# Patient Record
Sex: FEMALE | Race: WHITE | NOT HISPANIC OR LATINO | Employment: OTHER | URBAN - METROPOLITAN AREA
[De-identification: names, ages, dates, MRNs, and addresses within clinical notes are randomized per-mention and may not be internally consistent; named-entity substitution may affect disease eponyms.]

---

## 2017-01-04 ENCOUNTER — CLINICAL SUPPORT (OUTPATIENT)
Dept: FAMILY MEDICINE CLINIC | Facility: CLINIC | Age: 82
End: 2017-01-04

## 2017-01-04 PROCEDURE — 96372 THER/PROPH/DIAG INJ SC/IM: CPT | Performed by: FAMILY MEDICINE

## 2017-01-04 RX ADMIN — CYANOCOBALAMIN 1000 MCG: 1000 INJECTION, SOLUTION INTRAMUSCULAR; SUBCUTANEOUS at 15:56

## 2017-01-06 ENCOUNTER — TELEPHONE (OUTPATIENT)
Dept: FAMILY MEDICINE CLINIC | Facility: CLINIC | Age: 82
End: 2017-01-06

## 2017-01-06 NOTE — TELEPHONE ENCOUNTER
Patient seen in ER and was prescibed of Prednisone taper packet in the ER and wants to know if its okay to take this medication

## 2017-01-09 NOTE — TELEPHONE ENCOUNTER
Spoke with pt and she returned to er last night and they increased prednisone and she is doing better.  She was seen in er for hives and burning. jay

## 2017-01-17 ENCOUNTER — TELEPHONE (OUTPATIENT)
Dept: FAMILY MEDICINE CLINIC | Facility: CLINIC | Age: 82
End: 2017-01-17

## 2017-01-20 ENCOUNTER — OFFICE VISIT (OUTPATIENT)
Dept: FAMILY MEDICINE CLINIC | Facility: CLINIC | Age: 82
End: 2017-01-20

## 2017-01-20 VITALS
OXYGEN SATURATION: 91 % | SYSTOLIC BLOOD PRESSURE: 152 MMHG | DIASTOLIC BLOOD PRESSURE: 84 MMHG | HEART RATE: 93 BPM | TEMPERATURE: 98.2 F | BODY MASS INDEX: 28.53 KG/M2 | WEIGHT: 156 LBS

## 2017-01-20 DIAGNOSIS — D72.829 LEUKOCYTOSIS, UNSPECIFIED TYPE: ICD-10-CM

## 2017-01-20 DIAGNOSIS — L50.9 HIVES: Primary | ICD-10-CM

## 2017-01-20 DIAGNOSIS — E87.6 HYPOKALEMIA: ICD-10-CM

## 2017-01-20 PROCEDURE — 99213 OFFICE O/P EST LOW 20 MIN: CPT | Performed by: FAMILY MEDICINE

## 2017-01-20 NOTE — PROGRESS NOTES
Subjective   Kelvin Restrepo is a 85 y.o. female. Presents today for   Chief Complaint   Patient presents with   • Follow-up     went to [facility] er.  Had hives started jan. 6th and was given prednisone. Swelled every where and had burning on her skin.  then went back to er and they gave her another medrol dosepak.  Also gave her benadryl.  er dr said her potassium was low.  .       History of Present Illness  Patient went to ER had severe hives, given 8 tabs of prednisone to take daily for the hives.  Reports felt worse and had panic attack, seen again and started on medrol pack instead.  Reports developed severe weakness, crying uncontrollably while on prednisone.  Went back again as panic attack reported chest tightness so sent to [facility] for cardiac rule out.  However patient declined.  We called for records have cbc, INR, cxr;  WBC 18.4, was on steroid.  Reports still feels weak, but improved overall.  Had diarrhea, but improved.  Relates told potassium low (did not receive copy), shin was given PO potassium. CXR negative.  Reports cp gone, nervous/anxiety resolved.  HIves have resolved now.    Review of Systems   Cardiovascular: Negative for chest pain.   Gastrointestinal: Negative for abdominal pain, nausea and vomiting.   Psychiatric/Behavioral: The patient is not nervous/anxious.        The following portions of the patient's history were reviewed and updated as appropriate: allergies, current medications, past medical history and problem list.    Patient Active Problem List   Diagnosis   • Suprapubic pain   • Allergic state   • Chronic constipation   • Depression   • Gastroesophageal reflux disease   • Fatigue   • Hyperlipidemia   • Hypothyroidism   • Insomnia   • Obstructive sleep apnea syndrome   • Osteoarthritis of multiple joints   • Osteoporosis   • Cobalamin deficiency   • Essential hypertension       Allergies   Allergen Reactions   • Amlodipine        Current Outpatient  Prescriptions on File Prior to Visit   Medication Sig Dispense Refill   • aspirin 325 MG tablet Take  by mouth daily.     • atorvastatin (LIPITOR) 10 MG tablet Take 1 tablet by mouth daily. 90 tablet 3   • B Complex-Folic Acid (B COMPLEX-VITAMIN B12 PO) Take  by mouth.     • cetirizine (ZyrTEC) 10 MG tablet Take  by mouth daily.     • doxepin (SINEquan) 10 MG capsule Take 1 capsule by mouth Every Night. 90 capsule 1   • esomeprazole (NexIUM) 40 MG capsule Take 1 capsule by mouth every morning before breakfast. 30 capsule 11   • levothyroxine (SYNTHROID, LEVOTHROID) 112 MCG tablet Take 1 tablet by mouth daily. 90 tablet 3   • lisinopril (PRINIVIL,ZESTRIL) 20 MG tablet Take 1 tablet by mouth Daily. 90 tablet 3   • LORazepam (ATIVAN) 0.5 MG tablet Take 1 tablet by mouth daily. 90 tablet 1   • meclizine 25 MG chewable tablet chewable tablet Chew 1 tablet 3 (three) times a day as needed (dizziness). 90 tablet 1   • Melatonin 1 MG capsule Take 1 mg by mouth daily. 90 each 3   • montelukast (SINGULAIR) 10 MG tablet Take  by mouth daily.     • Multiple Minerals-Vitamins (CALCIUM & VIT D3 BONE HEALTH PO) Take  by mouth.     • ranitidine (ZANTAC) 150 MG tablet Take 150 mg by mouth 2 (two) times a day.     • vitamin C (ASCORBIC ACID) 500 MG tablet Take 1,000 mg by mouth daily.       Current Facility-Administered Medications on File Prior to Visit   Medication Dose Route Frequency Provider Last Rate Last Dose   • cyanocobalamin injection 1,000 mcg  1,000 mcg Intramuscular Q28 Days Leoncio Stoll DO   1,000 mcg at 01/04/17 1556       Objective   Vitals:    01/20/17 1244   BP: 152/84   Pulse: 93   Temp: 98.2 °F (36.8 °C)   SpO2: 91%   Weight: 156 lb (70.8 kg)       Physical Exam   Constitutional: She appears well-developed and well-nourished.   HENT:   Head: Normocephalic and atraumatic.   Neck: Neck supple. No JVD present. No thyromegaly present.   Cardiovascular: Normal rate, regular rhythm and normal heart sounds.  Exam  reveals no gallop and no friction rub.    No murmur heard.  Pulmonary/Chest: Effort normal and breath sounds normal. No respiratory distress. She has no wheezes. She has no rales.   Abdominal: Soft. Bowel sounds are normal. She exhibits no distension. There is no tenderness. There is no rebound and no guarding.   Musculoskeletal: She exhibits no edema.   Neurological: She is alert.   Skin: Skin is warm and dry.   Psychiatric: She has a normal mood and affect. Her behavior is normal.   Nursing note and vitals reviewed.      Assessment/Plan   Kelvin was seen today for follow-up.    Diagnoses and all orders for this visit:    Hives  -     CBC & Differential  -     Comprehensive Metabolic Panel    Hypokalemia  -     Comprehensive Metabolic Panel    Leukocytosis, unspecified type  -     CBC & Differential  -     Comprehensive Metabolic Panel    -will rcheck wbc and K+;  Stay off steroids for now.         -Follow up: 6 months       Current Outpatient Prescriptions:   •  aspirin 325 MG tablet, Take  by mouth daily., Disp: , Rfl:   •  atorvastatin (LIPITOR) 10 MG tablet, Take 1 tablet by mouth daily., Disp: 90 tablet, Rfl: 3  •  B Complex-Folic Acid (B COMPLEX-VITAMIN B12 PO), Take  by mouth., Disp: , Rfl:   •  cetirizine (ZyrTEC) 10 MG tablet, Take  by mouth daily., Disp: , Rfl:   •  doxepin (SINEquan) 10 MG capsule, Take 1 capsule by mouth Every Night., Disp: 90 capsule, Rfl: 1  •  esomeprazole (NexIUM) 40 MG capsule, Take 1 capsule by mouth every morning before breakfast., Disp: 30 capsule, Rfl: 11  •  levothyroxine (SYNTHROID, LEVOTHROID) 112 MCG tablet, Take 1 tablet by mouth daily., Disp: 90 tablet, Rfl: 3  •  lisinopril (PRINIVIL,ZESTRIL) 20 MG tablet, Take 1 tablet by mouth Daily., Disp: 90 tablet, Rfl: 3  •  LORazepam (ATIVAN) 0.5 MG tablet, Take 1 tablet by mouth daily., Disp: 90 tablet, Rfl: 1  •  meclizine 25 MG chewable tablet chewable tablet, Chew 1 tablet 3 (three) times a day as needed (dizziness)., Disp: 90  tablet, Rfl: 1  •  Melatonin 1 MG capsule, Take 1 mg by mouth daily., Disp: 90 each, Rfl: 3  •  montelukast (SINGULAIR) 10 MG tablet, Take  by mouth daily., Disp: , Rfl:   •  Multiple Minerals-Vitamins (CALCIUM & VIT D3 BONE HEALTH PO), Take  by mouth., Disp: , Rfl:   •  ranitidine (ZANTAC) 150 MG tablet, Take 150 mg by mouth 2 (two) times a day., Disp: , Rfl:   •  vitamin C (ASCORBIC ACID) 500 MG tablet, Take 1,000 mg by mouth daily., Disp: , Rfl:     Current Facility-Administered Medications:   •  cyanocobalamin injection 1,000 mcg, 1,000 mcg, Intramuscular, Q28 Days, Leoncio Stoll DO, 1,000 mcg at 01/04/17 1553

## 2017-01-20 NOTE — MR AVS SNAPSHOT
Kelvin Restrepo   1/20/2017 1:00 PM   Office Visit    Provider:  Leoncio Stoll DO   Department:  [facility]   Dept Phone:  [phone]                Your Full Care Plan              Your Updated Medication List          This list is accurate as of: 1/20/17  1:56 PM.  Always use your most recent med list.                aspirin 325 MG tablet       atorvastatin 10 MG tablet   Commonly known as:  LIPITOR   Take 1 tablet by mouth daily.       B COMPLEX-VITAMIN B12 PO       CALCIUM & VIT D3 BONE HEALTH PO       cetirizine 10 MG tablet   Commonly known as:  zyrTEC       doxepin 10 MG capsule   Commonly known as:  SINEquan   Take 1 capsule by mouth Every Night.       esomeprazole 40 MG capsule   Commonly known as:  nexIUM   Take 1 capsule by mouth every morning before breakfast.       levothyroxine 112 MCG tablet   Commonly known as:  SYNTHROID, LEVOTHROID   Take 1 tablet by mouth daily.       lisinopril 20 MG tablet   Commonly known as:  PRINIVIL,ZESTRIL   Take 1 tablet by mouth Daily.       LORazepam 0.5 MG tablet   Commonly known as:  ATIVAN   Take 1 tablet by mouth daily.       meclizine 25 MG chewable tablet chewable tablet   Chew 1 tablet 3 (three) times a day as needed (dizziness).       Melatonin 1 MG capsule   Take 1 mg by mouth daily.       montelukast 10 MG tablet   Commonly known as:  SINGULAIR       raNITIdine 150 MG tablet   Commonly known as:  ZANTAC       vitamin C 500 MG tablet   Commonly known as:  ASCORBIC ACID               We Performed the Following     CBC & Differential     Comprehensive Metabolic Panel       You Were Diagnosed With        Codes Comments    Hives    -  Primary ICD-10-CM: L50.9  ICD-9-CM: 708.9     Hypokalemia     ICD-10-CM: E87.6  ICD-9-CM: 276.8     Leukocytosis, unspecified type     ICD-10-CM: D72.829  ICD-9-CM: 288.60       Medications to be Given to You by a Medical Professional     Due       Frequency    (none)  cyanocobalamin injection 1,000 mcg  Every 28 Days      Instructions     None    Patient Instructions History      MyChart Signup     Our records indicate that you have declined [facility] sign[facility]. If you would like to sign up for [employer], please email [email or web address] or call [phone] to obtain an activation code.             Other Info from Your Visit           Allergies     Amlodipine        Reason for Visit     Follow-up went to [facility] er.  Had hives started jan. 6th and was given prednisone. Swelled every where and had burning on her skin.  then went back to er and they gave her another medrol dosepak.  Also gave her benadryl.  er dr said her potassium was low.  .      Vital Signs     Blood Pressure Pulse Temperature Weight Oxygen Saturation Body Mass Index    152/84 93 98.2 °F (36.8 °C) 156 lb (70.8 kg) 91% 28.53 kg/m2    Smoking Status                   Never Smoker           Problems and Diagnoses Noted     Hives    -  Primary    Hypokalemia        Elevated white blood cell count

## 2017-01-21 LAB
ALBUMIN SERPL-MCNC: 4.1 G/DL (ref 3.5–4.7)
ALBUMIN/GLOB SERPL: 1.7 {RATIO} (ref 1.1–2.5)
ALP SERPL-CCNC: 60 IU/L (ref 39–117)
ALT SERPL-CCNC: 29 IU/L (ref 0–32)
AST SERPL-CCNC: 21 IU/L (ref 0–40)
BASOPHILS # BLD AUTO: 0 X10E3/UL (ref 0–0.2)
BASOPHILS NFR BLD AUTO: 0 %
BILIRUB SERPL-MCNC: 0.4 MG/DL (ref 0–1.2)
BUN SERPL-MCNC: 12 MG/DL (ref 8–27)
BUN/CREAT SERPL: 15 (ref 11–26)
CALCIUM SERPL-MCNC: 9.1 MG/DL (ref 8.7–10.3)
CHLORIDE SERPL-SCNC: 94 MMOL/L (ref 96–106)
CO2 SERPL-SCNC: 22 MMOL/L (ref 18–29)
CREAT SERPL-MCNC: 0.79 MG/DL (ref 0.57–1)
EOSINOPHIL # BLD AUTO: 0 X10E3/UL (ref 0–0.4)
EOSINOPHIL NFR BLD AUTO: 0 %
ERYTHROCYTE [DISTWIDTH] IN BLOOD BY AUTOMATED COUNT: 13.8 % (ref 12.3–15.4)
GLOBULIN SER CALC-MCNC: 2.4 G/DL (ref 1.5–4.5)
GLUCOSE SERPL-MCNC: 68 MG/DL (ref 65–99)
HCT VFR BLD AUTO: 44.2 % (ref 34–46.6)
HGB BLD-MCNC: 14.2 G/DL (ref 11.1–15.9)
IMM GRANULOCYTES # BLD: 0 X10E3/UL (ref 0–0.1)
IMM GRANULOCYTES NFR BLD: 0 %
LYMPHOCYTES # BLD AUTO: 2 X10E3/UL (ref 0.7–3.1)
LYMPHOCYTES NFR BLD AUTO: 18 %
MCH RBC QN AUTO: 28.7 PG (ref 26.6–33)
MCHC RBC AUTO-ENTMCNC: 32.1 G/DL (ref 31.5–35.7)
MCV RBC AUTO: 90 FL (ref 79–97)
MONOCYTES # BLD AUTO: 1.3 X10E3/UL (ref 0.1–0.9)
MONOCYTES NFR BLD AUTO: 11 %
NEUTROPHILS # BLD AUTO: 7.8 X10E3/UL (ref 1.4–7)
NEUTROPHILS NFR BLD AUTO: 71 %
PLATELET # BLD AUTO: 238 X10E3/UL (ref 150–379)
POTASSIUM SERPL-SCNC: 4.5 MMOL/L (ref 3.5–5.2)
PROT SERPL-MCNC: 6.5 G/DL (ref 6–8.5)
RBC # BLD AUTO: 4.94 X10E6/UL (ref 3.77–5.28)
SODIUM SERPL-SCNC: 137 MMOL/L (ref 134–144)
WBC # BLD AUTO: 11.2 X10E3/UL (ref 3.4–10.8)

## 2017-01-23 ENCOUNTER — TELEPHONE (OUTPATIENT)
Dept: FAMILY MEDICINE CLINIC | Facility: CLINIC | Age: 82
End: 2017-01-23

## 2017-01-23 NOTE — PROGRESS NOTES
Call and mail copy of results to patient.  WBC count has dropped since off steroids.  Kidney, liver function and electrolytes normal range.

## 2017-01-23 NOTE — TELEPHONE ENCOUNTER
----- Message from Leoncio Stoll DO sent at 1/22/2017  7:17 PM EST -----  Call and mail copy of results to patient.  WBC count has dropped since off steroids.  Kidney, liver function and electrolytes normal range.

## 2017-02-16 ENCOUNTER — CLINICAL SUPPORT (OUTPATIENT)
Dept: FAMILY MEDICINE CLINIC | Facility: CLINIC | Age: 82
End: 2017-02-16

## 2017-02-16 DIAGNOSIS — E53.8 VITAMIN B 12 DEFICIENCY: ICD-10-CM

## 2017-02-16 PROCEDURE — 96372 THER/PROPH/DIAG INJ SC/IM: CPT | Performed by: FAMILY MEDICINE

## 2017-02-16 RX ADMIN — CYANOCOBALAMIN 1000 MCG: 1000 INJECTION, SOLUTION INTRAMUSCULAR; SUBCUTANEOUS at 15:00

## 2017-03-06 ENCOUNTER — TELEPHONE (OUTPATIENT)
Dept: FAMILY MEDICINE CLINIC | Facility: CLINIC | Age: 82
End: 2017-03-06

## 2017-03-06 DIAGNOSIS — E03.8 OTHER SPECIFIED HYPOTHYROIDISM: ICD-10-CM

## 2017-03-06 RX ORDER — LEVOTHYROXINE SODIUM 112 UG/1
112 TABLET ORAL DAILY
Qty: 90 TABLET | Refills: 3 | Status: SHIPPED | OUTPATIENT
Start: 2017-03-06 | End: 2018-03-30 | Stop reason: SDUPTHER

## 2017-03-13 ENCOUNTER — CLINICAL SUPPORT (OUTPATIENT)
Dept: FAMILY MEDICINE CLINIC | Facility: CLINIC | Age: 82
End: 2017-03-13

## 2017-03-13 DIAGNOSIS — E53.8 COBALAMIN DEFICIENCY: ICD-10-CM

## 2017-03-13 PROCEDURE — 96372 THER/PROPH/DIAG INJ SC/IM: CPT | Performed by: FAMILY MEDICINE

## 2017-03-13 RX ADMIN — CYANOCOBALAMIN 1000 MCG: 1000 INJECTION, SOLUTION INTRAMUSCULAR; SUBCUTANEOUS at 14:11

## 2017-04-05 DIAGNOSIS — F41.9 ANXIETY: ICD-10-CM

## 2017-04-05 RX ORDER — LORAZEPAM 0.5 MG/1
0.5 TABLET ORAL DAILY
Qty: 90 TABLET | Refills: 0 | Status: SHIPPED | OUTPATIENT
Start: 2017-04-05 | End: 2017-04-05 | Stop reason: SDUPTHER

## 2017-04-05 RX ORDER — LORAZEPAM 0.5 MG/1
0.5 TABLET ORAL DAILY
Qty: 90 TABLET | Refills: 0 | Status: SHIPPED | OUTPATIENT
Start: 2017-04-05 | End: 2017-09-18 | Stop reason: SDUPTHER

## 2017-05-03 ENCOUNTER — CLINICAL SUPPORT (OUTPATIENT)
Dept: FAMILY MEDICINE CLINIC | Facility: CLINIC | Age: 82
End: 2017-05-03

## 2017-05-03 DIAGNOSIS — E53.8 COBALAMIN DEFICIENCY: ICD-10-CM

## 2017-05-03 PROCEDURE — 96372 THER/PROPH/DIAG INJ SC/IM: CPT | Performed by: FAMILY MEDICINE

## 2017-05-03 RX ADMIN — CYANOCOBALAMIN 1000 MCG: 1000 INJECTION, SOLUTION INTRAMUSCULAR; SUBCUTANEOUS at 11:28

## 2017-06-09 ENCOUNTER — OFFICE VISIT (OUTPATIENT)
Dept: FAMILY MEDICINE CLINIC | Facility: CLINIC | Age: 82
End: 2017-06-09

## 2017-06-09 VITALS
HEIGHT: 62 IN | HEART RATE: 84 BPM | SYSTOLIC BLOOD PRESSURE: 154 MMHG | OXYGEN SATURATION: 93 % | TEMPERATURE: 98 F | BODY MASS INDEX: 28.71 KG/M2 | DIASTOLIC BLOOD PRESSURE: 82 MMHG | WEIGHT: 156 LBS

## 2017-06-09 DIAGNOSIS — E03.9 HYPOTHYROIDISM (ACQUIRED): ICD-10-CM

## 2017-06-09 DIAGNOSIS — R53.1 WEAKNESS: ICD-10-CM

## 2017-06-09 DIAGNOSIS — E56.9 VITAMIN DEFICIENCY: ICD-10-CM

## 2017-06-09 DIAGNOSIS — R53.83 FATIGUE, UNSPECIFIED TYPE: Primary | ICD-10-CM

## 2017-06-09 LAB
BILIRUB BLD-MCNC: NEGATIVE MG/DL
CLARITY, POC: CLEAR
COLOR UR: YELLOW
GLUCOSE UR STRIP-MCNC: NEGATIVE MG/DL
HCT VFR BLDA CALC: 43.9 %
HGB BLDA-MCNC: 13.9 G/DL
KETONES UR QL: NEGATIVE
LEUKOCYTE EST, POC: NEGATIVE
MCH, POC: 27.4
MCHC, POC: 30.9
MCV, POC: 88.7
NITRITE UR-MCNC: NEGATIVE MG/ML
PH UR: 5.5 [PH] (ref 5–8)
PLATELET # BLD AUTO: 228 10*3/MM3
PMV BLD: 8 FL
PROT UR STRIP-MCNC: NEGATIVE MG/DL
RBC # UR STRIP: ABNORMAL /UL
RBC, POC: 4.95
RDW, POC: 14.4
SP GR UR: 1.01 (ref 1–1.03)
UROBILINOGEN UR QL: NORMAL
WBC # BLD: 6.9 10*3/UL

## 2017-06-09 PROCEDURE — 36415 COLL VENOUS BLD VENIPUNCTURE: CPT | Performed by: NURSE PRACTITIONER

## 2017-06-09 PROCEDURE — 99213 OFFICE O/P EST LOW 20 MIN: CPT | Performed by: NURSE PRACTITIONER

## 2017-06-09 PROCEDURE — 85027 COMPLETE CBC AUTOMATED: CPT | Performed by: NURSE PRACTITIONER

## 2017-06-09 PROCEDURE — 81003 URINALYSIS AUTO W/O SCOPE: CPT | Performed by: NURSE PRACTITIONER

## 2017-06-09 NOTE — PROGRESS NOTES
Subjective   Kelvin Resrtepo is a 86 y.o. female.  Fatigue This as been going on for about a few months. It started gradually. She didn't really notice until lately when it's gotten to the point where she doesn't want to get up and do anything and that's not like her. She has not noticed any urinary symptoms, no blood in stools, no abdominal pain. She has known thyroid disease and Vitamin B12 deficiency.  She thinks she probably needs labs done because it's been a while.     Fatigue   This is a new problem. The current episode started more than 1 month ago. The problem occurs constantly. The problem has been gradually worsening. Associated symptoms include chills and fatigue. Pertinent negatives include no arthralgias, fever or myalgias. Associated symptoms comments: Just feels exhausted  . Treatments tried: Vitamin B 12 injection, last dose in early May.        The following portions of the patient's history were reviewed and updated as appropriate: allergies, current medications, past medical history, past social history and problem list.    Review of Systems   Constitutional: Positive for activity change, chills and fatigue. Negative for appetite change, fever and unexpected weight change.   Respiratory: Negative.    Cardiovascular: Negative.    Genitourinary: Negative.    Musculoskeletal: Negative for arthralgias and myalgias.       Objective   Physical Exam   Constitutional: Vital signs are normal. She appears well-developed and well-nourished. She is cooperative.   HENT:   Right Ear: Hearing and tympanic membrane normal.   Left Ear: Hearing and tympanic membrane normal.   Mouth/Throat: Uvula is midline, oropharynx is clear and moist and mucous membranes are normal.   Cardiovascular: Normal rate, regular rhythm and normal heart sounds.    Pulmonary/Chest: Effort normal and breath sounds normal.   Neurological: She is alert.   Nursing note and vitals reviewed.    Vitals:    06/09/17 1357   BP: 154/82   Pulse: 84  "  Temp: 98 °F (36.7 °C)   TempSrc: Oral   SpO2: 93%   Weight: 156 lb (70.8 kg)   Height: 62\" (157.5 cm)     Results for orders placed or performed in visit on 06/09/17   POC Urinalysis Dipstick, Automated   Result Value Ref Range    Color Yellow Yellow, Straw, Dark Yellow, Mercedes    Clarity, UA Clear Clear    Glucose, UA Negative Negative, 1000 mg/dL (3+) mg/dL    Bilirubin Negative Negative    Ketones, UA Negative Negative    Specific Gravity  1.010 1.005 - 1.030    Blood, UA Trace (A) Negative    pH, Urine 5.5 5.0 - 8.0    Protein, POC Negative Negative mg/dL    Urobilinogen, UA Normal Normal    Leukocytes Negative Negative    Nitrite, UA Negative Negative   POC CBC   Result Value Ref Range    Hematocrit 43.9 %    Hemoglobin 13.9 g/dL    RBC 4.95     WBC 6.9     MCV 88.7     MCH 27.4     MCHC 30.9     RDW-CV 14.4     Platelets 228 10*3/mm3    MPV 8.0        Assessment/Plan   Littie was seen today for fatigue.    Diagnoses and all orders for this visit:    Fatigue, unspecified type  -     TSH  -     Vitamin B12  -     Cancel: CBC & Differential  -     Comprehensive Metabolic Panel  -     POC Urinalysis Dipstick, Automated  -     POC CBC    Hypothyroidism (acquired)  -     TSH    Vitamin deficiency  -     Vitamin B12    Weakness  -     POC CBC    Fatigue: Will check some labs.   Follow up with Dr. Stoll as planned.  She will get a B12 injection today.           "

## 2017-06-10 LAB
ALBUMIN SERPL-MCNC: 4.5 G/DL (ref 3.5–4.7)
ALBUMIN/GLOB SERPL: 1.6 {RATIO} (ref 1.2–2.2)
ALP SERPL-CCNC: 80 IU/L (ref 39–117)
ALT SERPL-CCNC: 21 IU/L (ref 0–32)
AST SERPL-CCNC: 24 IU/L (ref 0–40)
BILIRUB SERPL-MCNC: 0.4 MG/DL (ref 0–1.2)
BUN SERPL-MCNC: 14 MG/DL (ref 8–27)
BUN/CREAT SERPL: 15 (ref 12–28)
CALCIUM SERPL-MCNC: 9.9 MG/DL (ref 8.7–10.3)
CHLORIDE SERPL-SCNC: 99 MMOL/L (ref 96–106)
CO2 SERPL-SCNC: 23 MMOL/L (ref 18–29)
CREAT SERPL-MCNC: 0.92 MG/DL (ref 0.57–1)
GLOBULIN SER CALC-MCNC: 2.8 G/DL (ref 1.5–4.5)
GLUCOSE SERPL-MCNC: 91 MG/DL (ref 65–99)
POTASSIUM SERPL-SCNC: 4.5 MMOL/L (ref 3.5–5.2)
PROT SERPL-MCNC: 7.3 G/DL (ref 6–8.5)
SODIUM SERPL-SCNC: 145 MMOL/L (ref 134–144)
TSH SERPL DL<=0.005 MIU/L-ACNC: 0.64 UIU/ML (ref 0.45–4.5)
VIT B12 SERPL-MCNC: 568 PG/ML (ref 211–946)

## 2017-06-11 LAB
BACTERIA UR CULT: NORMAL
BACTERIA UR CULT: NORMAL

## 2017-06-12 ENCOUNTER — TELEPHONE (OUTPATIENT)
Dept: FAMILY MEDICINE CLINIC | Facility: CLINIC | Age: 82
End: 2017-06-12

## 2017-06-12 ENCOUNTER — CLINICAL SUPPORT (OUTPATIENT)
Dept: FAMILY MEDICINE CLINIC | Facility: CLINIC | Age: 82
End: 2017-06-12

## 2017-06-12 DIAGNOSIS — E53.8 COBALAMIN DEFICIENCY: ICD-10-CM

## 2017-06-12 PROCEDURE — 96372 THER/PROPH/DIAG INJ SC/IM: CPT | Performed by: FAMILY MEDICINE

## 2017-06-12 RX ORDER — PANTOPRAZOLE SODIUM 40 MG/1
40 TABLET, DELAYED RELEASE ORAL DAILY
Qty: 30 TABLET | Refills: 11 | Status: SHIPPED | OUTPATIENT
Start: 2017-06-12 | End: 2019-10-08

## 2017-06-12 RX ADMIN — CYANOCOBALAMIN 1000 MCG: 1000 INJECTION, SOLUTION INTRAMUSCULAR; SUBCUTANEOUS at 17:43

## 2017-06-12 NOTE — TELEPHONE ENCOUNTER
Medication noted in the bottom of messages.   She is needing a script for Protonix because the pharmacy no longer covers Nexium.

## 2017-06-13 ENCOUNTER — TELEPHONE (OUTPATIENT)
Dept: FAMILY MEDICINE CLINIC | Facility: CLINIC | Age: 82
End: 2017-06-13

## 2017-06-13 NOTE — TELEPHONE ENCOUNTER
----- Message from VEENA Hoyt sent at 6/12/2017  1:06 PM EDT -----  Urine culture was negative. Other labs were ok. Thyroid normal, B 12 568, Chemistry normal range.  CBC still not resulted.

## 2017-07-03 ENCOUNTER — OFFICE VISIT (OUTPATIENT)
Dept: FAMILY MEDICINE CLINIC | Facility: CLINIC | Age: 82
End: 2017-07-03

## 2017-07-03 VITALS
WEIGHT: 155 LBS | HEIGHT: 62 IN | OXYGEN SATURATION: 96 % | DIASTOLIC BLOOD PRESSURE: 68 MMHG | BODY MASS INDEX: 28.52 KG/M2 | HEART RATE: 90 BPM | TEMPERATURE: 97.6 F | SYSTOLIC BLOOD PRESSURE: 122 MMHG

## 2017-07-03 DIAGNOSIS — M54.6 CHRONIC LEFT-SIDED THORACIC BACK PAIN: Primary | ICD-10-CM

## 2017-07-03 DIAGNOSIS — M54.50 CHRONIC LEFT-SIDED LOW BACK PAIN WITHOUT SCIATICA: ICD-10-CM

## 2017-07-03 DIAGNOSIS — G89.29 CHRONIC LEFT-SIDED LOW BACK PAIN WITHOUT SCIATICA: ICD-10-CM

## 2017-07-03 DIAGNOSIS — G89.29 CHRONIC LEFT-SIDED THORACIC BACK PAIN: Primary | ICD-10-CM

## 2017-07-03 PROCEDURE — 99213 OFFICE O/P EST LOW 20 MIN: CPT | Performed by: NURSE PRACTITIONER

## 2017-07-03 NOTE — PROGRESS NOTES
"Subjective   Kelvin Restrepo is a 86 y.o. female who presents c/o low back pain. Pain is starting to settle but would like to discuss a brace for future flare ups.     History of Present Illness   Low back pain will flare up periodically, with history of back problems, took a while to settle. Would like a back brace to support it during these times. Has never used a brace before, but felt stomach support would have helped with flare at the time.     Whole spine flares at times, but no new regions, overall settling to baseline. Reports typical flare will include L flank area when flared, thinks brace will help this.     Stomach is better since on protonix. No appetite initially off nexium due to formulary change at [place]. Still some bloating with milk products.   The following portions of the patient's history were reviewed and updated as appropriate: allergies, current medications, past family history, past medical history, past social history, past surgical history and problem list.    Review of Systems   Constitutional: Positive for activity change. Negative for fever.   Respiratory: Negative for chest tightness and shortness of breath.    Gastrointestinal: Negative for constipation (no bowel or bladder dysfunction) and diarrhea.   Genitourinary: Negative for difficulty urinating.   Musculoskeletal: Positive for back pain and gait problem. Negative for arthralgias, joint swelling and myalgias.   Neurological: Negative for weakness (leg) and headaches.     /68  Pulse 90  Temp 97.6 °F (36.4 °C) (Oral)   Ht 62\" (157.5 cm)  Wt 155 lb (70.3 kg)  SpO2 96%  BMI 28.35 kg/m2    Objective   Physical Exam   Constitutional: She appears well-developed and well-nourished. No distress.   Musculoskeletal: She exhibits no edema.        Thoracic back: She exhibits tenderness, pain and spasm.        Lumbar back: She exhibits decreased range of motion, tenderness, pain and spasm. She exhibits no bony tenderness, no " swelling and no edema.   SLR karlene negative, Kyler negative karlene, + SI tenderness, negative piriformis to distraction, no point tenderness.    Neurological: No sensory deficit. She exhibits normal muscle tone.   Reflex Scores:       Patellar reflexes are 2+ on the right side and 2+ on the left side.       Achilles reflexes are 2+ on the right side and 2+ on the left side.  Nursing note and vitals reviewed.    Assessment/Plan   Problems Addressed this Visit     None      Visit Diagnoses     Chronic left-sided thoracic back pain    -  Primary    Chronic left-sided low back pain without sciatica            For acute flares reasonable for a trial of back brace. Discussed limits, not to wear daily, only with flares intermittently.Rx issued.

## 2017-07-06 ENCOUNTER — TELEPHONE (OUTPATIENT)
Dept: FAMILY MEDICINE CLINIC | Facility: CLINIC | Age: 82
End: 2017-07-06

## 2017-07-07 DIAGNOSIS — G47.09 OTHER INSOMNIA: ICD-10-CM

## 2017-07-24 ENCOUNTER — OFFICE VISIT (OUTPATIENT)
Dept: FAMILY MEDICINE CLINIC | Facility: CLINIC | Age: 82
End: 2017-07-24

## 2017-07-24 VITALS
BODY MASS INDEX: 27.79 KG/M2 | WEIGHT: 151 LBS | SYSTOLIC BLOOD PRESSURE: 118 MMHG | HEART RATE: 92 BPM | DIASTOLIC BLOOD PRESSURE: 68 MMHG | TEMPERATURE: 98.4 F | HEIGHT: 62 IN | OXYGEN SATURATION: 96 %

## 2017-07-24 DIAGNOSIS — B02.9 HERPES ZOSTER WITHOUT COMPLICATION: Primary | ICD-10-CM

## 2017-07-24 PROCEDURE — 99213 OFFICE O/P EST LOW 20 MIN: CPT | Performed by: NURSE PRACTITIONER

## 2017-07-24 RX ORDER — GABAPENTIN 100 MG/1
100 CAPSULE ORAL 3 TIMES DAILY
Qty: 90 CAPSULE | Refills: 0 | Status: SHIPPED | OUTPATIENT
Start: 2017-07-24 | End: 2018-06-01

## 2017-07-24 RX ORDER — VALACYCLOVIR HYDROCHLORIDE 1 G/1
1000 TABLET, FILM COATED ORAL 3 TIMES DAILY
Qty: 21 TABLET | Refills: 0 | Status: SHIPPED | OUTPATIENT
Start: 2017-07-24 | End: 2018-06-01

## 2017-07-24 NOTE — PROGRESS NOTES
"Yudy Restrepo is a 86 y.o. female who presents c/o blisters on buttocks, groin, vagina and right hip. Went to GYN with [facility] last week and was given nystatin and an ointment.     History of Present Illness   Reports rash onset with 2-3 lesions last Friday(3 days ago)  Told yeast infection, has been applying nystatin, but lesions spreading to buttock, very painful to touch. Does not hurt to urinate  The following portions of the patient's history were reviewed and updated as appropriate: allergies, current medications, past family history, past medical history, past social history, past surgical history and problem list.    Review of Systems   Constitutional: Negative.  Negative for chills and fever.   HENT: Negative.    Respiratory: Negative.  Negative for cough and wheezing.    Skin: Positive for rash.   Allergic/Immunologic: Negative for environmental allergies and food allergies.     /68  Pulse 92  Temp 98.4 °F (36.9 °C) (Oral)   Ht 62\" (157.5 cm)  Wt 151 lb (68.5 kg)  SpO2 96%  BMI 27.62 kg/m2    Objective   Physical Exam   Constitutional: She appears well-developed and well-nourished.   Skin:   Blisters to R buttock, inner thigh and groin, confluent   Psychiatric: She has a normal mood and affect. Her behavior is normal. Judgment and thought content normal.   Nursing note and vitals reviewed.    Assessment/Plan   Problems Addressed this Visit     None      Visit Diagnoses     Herpes zoster without complication    -  Primary    Relevant Medications    gabapentin (NEURONTIN) 100 MG capsule    valACYclovir (VALTREX) 1000 MG tablet        Cautiously titrate up low dose gabapentin for severe pain. Instructions given. Start valtrex. Use contact precautions, separate bathrooms from spouse. Consider vaccination 6 months after healed. Consider vaccination for spouse.     HIWOT Report:    As part of this patient's treatment plan, I am prescribing controlled substances. The patient has been made " aware of appropriate use of such medications, including potential risk of somnolence, limited ability to drive and /or work safely, and potential for dependence or overdose. It has also been made clear that these medications are for use by this patient only, without concomitant use of alcohol or other substances unless prescribed.    HIWOT report has been reviewed by: VEENA Serrato on 07/24/17.  The report was scanned into the patient's chart.    Date of last HIWOT:  7/24/2017

## 2017-07-25 ENCOUNTER — TELEPHONE (OUTPATIENT)
Dept: FAMILY MEDICINE CLINIC | Facility: CLINIC | Age: 82
End: 2017-07-25

## 2017-07-25 RX ORDER — TRIAMCINOLONE ACETONIDE 0.25 MG/G
CREAM TOPICAL
Qty: 30 G | Refills: 0 | Status: SHIPPED | OUTPATIENT
Start: 2017-07-25 | End: 2018-06-01

## 2017-07-25 RX ORDER — TRIAMCINOLONE ACETONIDE 0.25 MG/G
OINTMENT TOPICAL 2 TIMES DAILY
Refills: 0 | Status: CANCELLED | OUTPATIENT
Start: 2017-07-25

## 2017-07-25 NOTE — TELEPHONE ENCOUNTER
Due to the delicate nature of the tissues where your shingles are at would recommend triamcinolone cream 0.025% cream bid to affected areas x 1 week dispense 30 gm 0rf

## 2017-08-14 ENCOUNTER — TELEPHONE (OUTPATIENT)
Dept: FAMILY MEDICINE CLINIC | Facility: CLINIC | Age: 82
End: 2017-08-14

## 2017-08-14 RX ORDER — ATORVASTATIN CALCIUM 10 MG/1
10 TABLET, FILM COATED ORAL DAILY
Qty: 90 TABLET | Refills: 3 | Status: SHIPPED | OUTPATIENT
Start: 2017-08-14 | End: 2017-08-14 | Stop reason: SDUPTHER

## 2017-08-14 RX ORDER — ATORVASTATIN CALCIUM 10 MG/1
10 TABLET, FILM COATED ORAL DAILY
Qty: 90 TABLET | Refills: 3 | Status: SHIPPED | OUTPATIENT
Start: 2017-08-14 | End: 2018-02-05 | Stop reason: SDUPTHER

## 2017-09-15 ENCOUNTER — TELEPHONE (OUTPATIENT)
Dept: FAMILY MEDICINE CLINIC | Facility: CLINIC | Age: 82
End: 2017-09-15

## 2017-09-18 DIAGNOSIS — Z79.899 DRUG THERAPY: Primary | ICD-10-CM

## 2017-09-18 DIAGNOSIS — F41.9 ANXIETY: ICD-10-CM

## 2017-09-18 RX ORDER — LORAZEPAM 0.5 MG/1
0.5 TABLET ORAL DAILY
Qty: 30 TABLET | Refills: 0 | Status: SHIPPED | OUTPATIENT
Start: 2017-09-18 | End: 2017-10-31 | Stop reason: SDUPTHER

## 2017-09-19 ENCOUNTER — CLINICAL SUPPORT (OUTPATIENT)
Dept: FAMILY MEDICINE CLINIC | Facility: CLINIC | Age: 82
End: 2017-09-19

## 2017-09-19 DIAGNOSIS — E53.8 COBALAMIN DEFICIENCY: ICD-10-CM

## 2017-09-19 PROCEDURE — 96372 THER/PROPH/DIAG INJ SC/IM: CPT | Performed by: FAMILY MEDICINE

## 2017-09-19 RX ADMIN — CYANOCOBALAMIN 1000 MCG: 1000 INJECTION, SOLUTION INTRAMUSCULAR; SUBCUTANEOUS at 15:38

## 2017-09-25 LAB — DRUGS UR: NORMAL

## 2017-10-03 ENCOUNTER — OFFICE VISIT (OUTPATIENT)
Dept: FAMILY MEDICINE CLINIC | Facility: CLINIC | Age: 82
End: 2017-10-03

## 2017-10-03 VITALS
HEART RATE: 79 BPM | WEIGHT: 157 LBS | DIASTOLIC BLOOD PRESSURE: 46 MMHG | TEMPERATURE: 97.7 F | BODY MASS INDEX: 28.72 KG/M2 | SYSTOLIC BLOOD PRESSURE: 86 MMHG | OXYGEN SATURATION: 98 %

## 2017-10-03 DIAGNOSIS — I95.1 ORTHOSTATIC HYPOTENSION: Primary | ICD-10-CM

## 2017-10-03 DIAGNOSIS — Z23 IMMUNIZATION DUE: ICD-10-CM

## 2017-10-03 DIAGNOSIS — I10 ESSENTIAL HYPERTENSION: ICD-10-CM

## 2017-10-03 PROCEDURE — 99214 OFFICE O/P EST MOD 30 MIN: CPT | Performed by: FAMILY MEDICINE

## 2017-10-03 PROCEDURE — G0008 ADMIN INFLUENZA VIRUS VAC: HCPCS | Performed by: FAMILY MEDICINE

## 2017-10-03 RX ORDER — LISINOPRIL 20 MG/1
10 TABLET ORAL DAILY
Qty: 90 TABLET | Refills: 3
Start: 2017-10-03 | End: 2017-11-14 | Stop reason: SDUPTHER

## 2017-10-03 NOTE — PROGRESS NOTES
Subjective   Kelvin Restrepo is a 86 y.o. female. Presents today for   Chief Complaint   Patient presents with   • Follow-up     follow up labs .  Having dizzy spells when getting up and flu injection.       Dizziness   This is a recurrent problem. The current episode started 1 to 4 weeks ago. The problem occurs intermittently. The problem has been waxing and waning. Pertinent negatives include no chest pain, headaches, numbness or weakness. Associated symptoms comments: Very light-headed and dizzy when sits or stands up.  No syncope;  Other day when standing up quickly from couch, fell backwards but no injuries.    No cp/soa;  No pnd/orthopnea    Did have recurrent episode of hives, lip swelling x1, had been much better and occurrences, sees allergists.  No throat swelling or tongue swelling;  No difficulty breathing;  Directed back to allergist.. Exacerbated by: standing up. Treatments tried: Better if takes time before walking. The treatment provided mild relief.     Since last seen has had shingles as well, healing now.    Non-smoker  No other medication hx changes since seen.    Review of Systems   Respiratory: Negative for shortness of breath.    Cardiovascular: Negative for chest pain.   Neurological: Positive for dizziness and light-headedness. Negative for syncope, facial asymmetry, speech difficulty, weakness, numbness and headaches.       The following portions of the patient's history were reviewed and updated as appropriate: allergies, current medications, past medical history and problem list.    Patient Active Problem List   Diagnosis   • Suprapubic pain   • Allergic state   • Chronic constipation   • Depression   • Gastroesophageal reflux disease   • Fatigue   • Hyperlipidemia   • Hypothyroidism   • Insomnia   • Obstructive sleep apnea syndrome   • Osteoarthritis of multiple joints   • Osteoporosis   • Cobalamin deficiency   • Essential hypertension       Allergies   Allergen Reactions   • Amlodipine       Patient does not recall ever having a reaction to this medication    • Prednisone Hives       Current Outpatient Prescriptions on File Prior to Visit   Medication Sig Dispense Refill   • aspirin 325 MG tablet Take  by mouth daily.     • atorvastatin (LIPITOR) 10 MG tablet Take 1 tablet by mouth Daily. 90 tablet 3   • B Complex-Folic Acid (B COMPLEX-VITAMIN B12 PO) Take  by mouth.     • cetirizine (ZyrTEC) 10 MG tablet Take 10 mg by mouth Daily.     • gabapentin (NEURONTIN) 100 MG capsule Take 1 capsule by mouth 3 (Three) Times a Day. 90 capsule 0   • levothyroxine (SYNTHROID, LEVOTHROID) 112 MCG tablet Take 1 tablet by mouth Daily. 90 tablet 3   • lisinopril (PRINIVIL,ZESTRIL) 20 MG tablet Take 1 tablet by mouth Daily. 90 tablet 3   • LORazepam (ATIVAN) 0.5 MG tablet Take 1 tablet by mouth Daily. 30 tablet 0   • meclizine 25 MG chewable tablet chewable tablet Chew 1 tablet 3 (three) times a day as needed (dizziness). 90 tablet 1   • Melatonin 1 MG capsule Take 1 mg by mouth Daily. 90 each 3   • montelukast (SINGULAIR) 10 MG tablet Take 10 mg by mouth Daily.     • Multiple Minerals-Vitamins (CALCIUM & VIT D3 BONE HEALTH PO) Take  by mouth.     • pantoprazole (PROTONIX) 40 MG EC tablet Take 1 tablet by mouth Daily. 30 tablet 11   • ranitidine (ZANTAC) 150 MG tablet Take 150 mg by mouth 2 (two) times a day.     • triamcinolone (KENALOG) 0.025 % cream Apply to affected area twice daily for 1 week 30 g 0   • valACYclovir (VALTREX) 1000 MG tablet Take 1 tablet by mouth 3 (Three) Times a Day. 21 tablet 0   • vitamin C (ASCORBIC ACID) 500 MG tablet Take 1,000 mg by mouth daily.       Current Facility-Administered Medications on File Prior to Visit   Medication Dose Route Frequency Provider Last Rate Last Dose   • cyanocobalamin injection 1,000 mcg  1,000 mcg Intramuscular Q28 Days Leoncio Stoll DO   1,000 mcg at 09/19/17 1538       Objective   Vitals:    10/03/17 1350 10/03/17 1411 10/03/17 1412 10/03/17 1413   BP:  120/70 157/88 138/93 (!) 86/46   BP Location:  Left arm Left arm Left arm   Patient Position:  Lying Sitting Standing   Pulse: 78 82 79    Temp: 97.7 °F (36.5 °C)      SpO2: 98%      Weight: 157 lb (71.2 kg)          Physical Exam   Constitutional: She appears well-developed and well-nourished.   HENT:   Head: Normocephalic and atraumatic.   Neck: Neck supple. No JVD present. No thyromegaly present.   Cardiovascular: Normal rate, regular rhythm and normal heart sounds.  Exam reveals no gallop and no friction rub.    No murmur heard.  Pulmonary/Chest: Effort normal and breath sounds normal. No respiratory distress. She has no wheezes. She has no rales.   Abdominal: Soft. Bowel sounds are normal. She exhibits no distension. There is no tenderness. There is no rebound and no guarding.   Musculoskeletal: She exhibits no edema.   Neurological: She is alert.   Skin: Skin is warm and dry.   Psychiatric: She has a normal mood and affect. Her behavior is normal.   Nursing note and vitals reviewed.      Assessment/Plan   Kelvin was seen today for follow-up.    Diagnoses and all orders for this visit:    Orthostatic hypotension    Immunization due  -     Flu Vaccine High Dose PF 65YR+    Essential hypertension  -     lisinopril (PRINIVIL,ZESTRIL) 20 MG tablet; Take 0.5 tablets by mouth Daily.    -cut lisinopril in half and will see if improves;  BP drops quite siginficantly with standing and discussed serious risks of fall and injury  -flu injection today         -Follow up: 4 weeks       Current Outpatient Prescriptions:   •  aspirin 325 MG tablet, Take  by mouth daily., Disp: , Rfl:   •  atorvastatin (LIPITOR) 10 MG tablet, Take 1 tablet by mouth Daily., Disp: 90 tablet, Rfl: 3  •  B Complex-Folic Acid (B COMPLEX-VITAMIN B12 PO), Take  by mouth., Disp: , Rfl:   •  cetirizine (ZyrTEC) 10 MG tablet, Take 10 mg by mouth Daily., Disp: , Rfl:   •  gabapentin (NEURONTIN) 100 MG capsule, Take 1 capsule by mouth 3 (Three) Times a  Day., Disp: 90 capsule, Rfl: 0  •  levothyroxine (SYNTHROID, LEVOTHROID) 112 MCG tablet, Take 1 tablet by mouth Daily., Disp: 90 tablet, Rfl: 3  •  lisinopril (PRINIVIL,ZESTRIL) 20 MG tablet, Take 1 tablet by mouth Daily., Disp: 90 tablet, Rfl: 3  •  LORazepam (ATIVAN) 0.5 MG tablet, Take 1 tablet by mouth Daily., Disp: 30 tablet, Rfl: 0  •  meclizine 25 MG chewable tablet chewable tablet, Chew 1 tablet 3 (three) times a day as needed (dizziness)., Disp: 90 tablet, Rfl: 1  •  Melatonin 1 MG capsule, Take 1 mg by mouth Daily., Disp: 90 each, Rfl: 3  •  montelukast (SINGULAIR) 10 MG tablet, Take 10 mg by mouth Daily., Disp: , Rfl:   •  Multiple Minerals-Vitamins (CALCIUM & VIT D3 BONE HEALTH PO), Take  by mouth., Disp: , Rfl:   •  pantoprazole (PROTONIX) 40 MG EC tablet, Take 1 tablet by mouth Daily., Disp: 30 tablet, Rfl: 11  •  ranitidine (ZANTAC) 150 MG tablet, Take 150 mg by mouth 2 (two) times a day., Disp: , Rfl:   •  triamcinolone (KENALOG) 0.025 % cream, Apply to affected area twice daily for 1 week, Disp: 30 g, Rfl: 0  •  valACYclovir (VALTREX) 1000 MG tablet, Take 1 tablet by mouth 3 (Three) Times a Day., Disp: 21 tablet, Rfl: 0  •  vitamin C (ASCORBIC ACID) 500 MG tablet, Take 1,000 mg by mouth daily., Disp: , Rfl:     Current Facility-Administered Medications:   •  cyanocobalamin injection 1,000 mcg, 1,000 mcg, Intramuscular, Q28 Days, Leoncio Stoll DO, 1,000 mcg at 09/19/17 1538

## 2017-10-31 ENCOUNTER — OFFICE VISIT (OUTPATIENT)
Dept: FAMILY MEDICINE CLINIC | Facility: CLINIC | Age: 82
End: 2017-10-31

## 2017-10-31 VITALS
BODY MASS INDEX: 29.45 KG/M2 | HEART RATE: 69 BPM | WEIGHT: 156 LBS | DIASTOLIC BLOOD PRESSURE: 80 MMHG | SYSTOLIC BLOOD PRESSURE: 130 MMHG | HEIGHT: 61 IN | OXYGEN SATURATION: 98 % | TEMPERATURE: 97.9 F

## 2017-10-31 DIAGNOSIS — Z90.11 H/O MASTECTOMY, RIGHT: ICD-10-CM

## 2017-10-31 DIAGNOSIS — F41.9 ANXIETY: ICD-10-CM

## 2017-10-31 DIAGNOSIS — I95.1 ORTHOSTATIC HYPOTENSION: ICD-10-CM

## 2017-10-31 DIAGNOSIS — I10 ESSENTIAL HYPERTENSION: Primary | ICD-10-CM

## 2017-10-31 PROCEDURE — 99213 OFFICE O/P EST LOW 20 MIN: CPT | Performed by: FAMILY MEDICINE

## 2017-10-31 RX ORDER — LORAZEPAM 0.5 MG/1
TABLET ORAL
Qty: 14 TABLET | Refills: 0 | Status: SHIPPED | OUTPATIENT
Start: 2017-10-31 | End: 2018-04-24

## 2017-10-31 RX ORDER — TIZANIDINE 2 MG/1
TABLET ORAL
Qty: 60 TABLET | Refills: 5 | Status: SHIPPED | OUTPATIENT
Start: 2017-10-31 | End: 2018-10-10

## 2017-10-31 NOTE — PROGRESS NOTES
uYdy Restrepo is a 86 y.o. female. Presents today for   Chief Complaint   Patient presents with   • Hypotension     pt here for 4 week follow up   • Anxiety     pt requesting refill of ativan.       Hypertension   This is a chronic problem. The current episode started more than 1 year ago. The problem is unchanged. The problem is controlled. Pertinent negatives include no chest pain, orthopnea, palpitations, peripheral edema, PND or shortness of breath. (Last ov severe orthostasis;  Resolved symptoms;  Not monitoring.) There are no associated agents to hypertension. Past treatments include ACE inhibitors (cut acei in half). The current treatment provides moderate improvement. There are no compliance problems.      FACE TO FACE:    Right breast prosthesis, needs to continue;  Had mastectomy 1996, utilizes breast prosthesis with appropriate fitting annually since 1996.    Review of Systems   Respiratory: Negative for shortness of breath.    Cardiovascular: Negative for chest pain, palpitations, orthopnea and PND.   Neurological: Negative for dizziness and light-headedness.       The following portions of the patient's history were reviewed and updated as appropriate: allergies, current medications, past medical history and problem list.    Patient Active Problem List   Diagnosis   • Suprapubic pain   • Allergic state   • Chronic constipation   • Depression   • Gastroesophageal reflux disease   • Fatigue   • Hyperlipidemia   • Hypothyroidism   • Insomnia   • Obstructive sleep apnea syndrome   • Osteoarthritis of multiple joints   • Osteoporosis   • Cobalamin deficiency   • Essential hypertension   • Anxiety       Allergies   Allergen Reactions   • Amlodipine      Patient does not recall ever having a reaction to this medication    • Prednisone Hives       Current Outpatient Prescriptions on File Prior to Visit   Medication Sig Dispense Refill   • aspirin 325 MG tablet Take  by mouth daily.     •  "atorvastatin (LIPITOR) 10 MG tablet Take 1 tablet by mouth Daily. 90 tablet 3   • B Complex-Folic Acid (B COMPLEX-VITAMIN B12 PO) Take  by mouth.     • cetirizine (ZyrTEC) 10 MG tablet Take 10 mg by mouth Daily.     • gabapentin (NEURONTIN) 100 MG capsule Take 1 capsule by mouth 3 (Three) Times a Day. 90 capsule 0   • levothyroxine (SYNTHROID, LEVOTHROID) 112 MCG tablet Take 1 tablet by mouth Daily. 90 tablet 3   • lisinopril (PRINIVIL,ZESTRIL) 20 MG tablet Take 0.5 tablets by mouth Daily. 90 tablet 3   • meclizine 25 MG chewable tablet chewable tablet Chew 1 tablet 3 (three) times a day as needed (dizziness). 90 tablet 1   • Melatonin 1 MG capsule Take 1 mg by mouth Daily. 90 each 3   • montelukast (SINGULAIR) 10 MG tablet Take 10 mg by mouth Daily.     • Multiple Minerals-Vitamins (CALCIUM & VIT D3 BONE HEALTH PO) Take  by mouth.     • pantoprazole (PROTONIX) 40 MG EC tablet Take 1 tablet by mouth Daily. 30 tablet 11   • ranitidine (ZANTAC) 150 MG tablet Take 150 mg by mouth 2 (two) times a day.     • triamcinolone (KENALOG) 0.025 % cream Apply to affected area twice daily for 1 week 30 g 0   • valACYclovir (VALTREX) 1000 MG tablet Take 1 tablet by mouth 3 (Three) Times a Day. 21 tablet 0   • vitamin C (ASCORBIC ACID) 500 MG tablet Take 1,000 mg by mouth daily.     • [DISCONTINUED] LORazepam (ATIVAN) 0.5 MG tablet Take 1 tablet by mouth Daily. 30 tablet 0     Current Facility-Administered Medications on File Prior to Visit   Medication Dose Route Frequency Provider Last Rate Last Dose   • cyanocobalamin injection 1,000 mcg  1,000 mcg Intramuscular Q28 Days Leoncio Stoll DO   1,000 mcg at 09/19/17 1538       Objective   Vitals:    10/31/17 1340 10/31/17 1346   BP: 146/90 130/80   BP Location: Left arm    Patient Position: Sitting    Cuff Size: Adult    Pulse: 69    Temp: 97.9 °F (36.6 °C)    TempSrc: Oral    SpO2: 98%    Weight: 156 lb (70.8 kg)    Height: 61\" (154.9 cm)        Physical Exam   Constitutional: " She appears well-developed and well-nourished.   HENT:   Head: Normocephalic and atraumatic.   Neck: Neck supple. No JVD present. No thyromegaly present.   Cardiovascular: Normal rate, regular rhythm and normal heart sounds.  Exam reveals no gallop and no friction rub.    No murmur heard.  Pulmonary/Chest: Effort normal and breath sounds normal. No respiratory distress. She has no wheezes. She has no rales.   Abdominal: Soft. Bowel sounds are normal. She exhibits no distension. There is no tenderness. There is no rebound and no guarding.   Musculoskeletal: She exhibits no edema.   Neurological: She is alert.   Skin: Skin is warm and dry.   Psychiatric: She has a normal mood and affect. Her behavior is normal.   Nursing note and vitals reviewed.  right mastectomy    Assessment/Plan   Littie was seen today for hypotension and anxiety.    Diagnoses and all orders for this visit:    Essential hypertension    Orthostatic hypotension    H/O mastectomy, right    Anxiety  -     LORazepam (ATIVAN) 0.5 MG tablet; 1/2 po nightly x 14 days, then 1/2 Sun, Tue, THur, Sat x 14 days, 1/2 Tue, Thurs x 14 day, 1/2 Tues x14 days then stop.    Other orders  -     tiZANidine (ZANAFLEX) 2 MG tablet; 1/2 to 1 every 12 hours prn back pain      -hx of right mastectomy, would medically benefit from continued prosthesis and bra;  Will fax Rx  -reports using ativan more for back pain then anything;     -I discussed today bzd short and long-term risks.  I also discussed that it is highly recommended that the patient wean off this medication given serious risks.  We discussed signs/symptoms of withdrawal and should not stop all at once and that can have withdrawal seizures and in rare cases death has been reported.  After discussion, I related that I am going to stop prescribing this class of drugs for the most part and for daily, long-term use.  Will try MR for pain instead and wean.         -Follow up: 6 months       Current Outpatient  Prescriptions:   •  aspirin 325 MG tablet, Take  by mouth daily., Disp: , Rfl:   •  atorvastatin (LIPITOR) 10 MG tablet, Take 1 tablet by mouth Daily., Disp: 90 tablet, Rfl: 3  •  B Complex-Folic Acid (B COMPLEX-VITAMIN B12 PO), Take  by mouth., Disp: , Rfl:   •  cetirizine (ZyrTEC) 10 MG tablet, Take 10 mg by mouth Daily., Disp: , Rfl:   •  gabapentin (NEURONTIN) 100 MG capsule, Take 1 capsule by mouth 3 (Three) Times a Day., Disp: 90 capsule, Rfl: 0  •  levothyroxine (SYNTHROID, LEVOTHROID) 112 MCG tablet, Take 1 tablet by mouth Daily., Disp: 90 tablet, Rfl: 3  •  lisinopril (PRINIVIL,ZESTRIL) 20 MG tablet, Take 0.5 tablets by mouth Daily., Disp: 90 tablet, Rfl: 3  •  LORazepam (ATIVAN) 0.5 MG tablet, 1/2 po nightly x 14 days, then 1/2 Sun, Tue, THur, Sat x 14 days, 1/2 Tue, Thurs x 14 day, 1/2 Tues x14 days then stop., Disp: 14 tablet, Rfl: 0  •  meclizine 25 MG chewable tablet chewable tablet, Chew 1 tablet 3 (three) times a day as needed (dizziness)., Disp: 90 tablet, Rfl: 1  •  Melatonin 1 MG capsule, Take 1 mg by mouth Daily., Disp: 90 each, Rfl: 3  •  montelukast (SINGULAIR) 10 MG tablet, Take 10 mg by mouth Daily., Disp: , Rfl:   •  Multiple Minerals-Vitamins (CALCIUM & VIT D3 BONE HEALTH PO), Take  by mouth., Disp: , Rfl:   •  pantoprazole (PROTONIX) 40 MG EC tablet, Take 1 tablet by mouth Daily., Disp: 30 tablet, Rfl: 11  •  ranitidine (ZANTAC) 150 MG tablet, Take 150 mg by mouth 2 (two) times a day., Disp: , Rfl:   •  triamcinolone (KENALOG) 0.025 % cream, Apply to affected area twice daily for 1 week, Disp: 30 g, Rfl: 0  •  valACYclovir (VALTREX) 1000 MG tablet, Take 1 tablet by mouth 3 (Three) Times a Day., Disp: 21 tablet, Rfl: 0  •  vitamin C (ASCORBIC ACID) 500 MG tablet, Take 1,000 mg by mouth daily., Disp: , Rfl:   •  tiZANidine (ZANAFLEX) 2 MG tablet, 1/2 to 1 every 12 hours prn back pain, Disp: 60 tablet, Rfl: 5    Current Facility-Administered Medications:   •  cyanocobalamin injection 1,000  mcg, 1,000 mcg, Intramuscular, Q28 Days, Leoncio Stoll DO, 1,000 mcg at 09/19/17 1537

## 2017-11-14 ENCOUNTER — TELEPHONE (OUTPATIENT)
Dept: FAMILY MEDICINE CLINIC | Facility: CLINIC | Age: 82
End: 2017-11-14

## 2017-11-14 DIAGNOSIS — I10 ESSENTIAL HYPERTENSION: ICD-10-CM

## 2017-11-14 RX ORDER — LISINOPRIL 20 MG/1
10 TABLET ORAL DAILY
Qty: 90 TABLET | Refills: 3 | Status: SHIPPED | OUTPATIENT
Start: 2017-11-14 | End: 2018-11-14 | Stop reason: DRUGHIGH

## 2017-12-04 ENCOUNTER — CLINICAL SUPPORT (OUTPATIENT)
Dept: FAMILY MEDICINE CLINIC | Facility: CLINIC | Age: 82
End: 2017-12-04

## 2017-12-04 DIAGNOSIS — E53.8 COBALAMIN DEFICIENCY: ICD-10-CM

## 2017-12-04 PROCEDURE — 96372 THER/PROPH/DIAG INJ SC/IM: CPT | Performed by: FAMILY MEDICINE

## 2017-12-04 RX ADMIN — CYANOCOBALAMIN 1000 MCG: 1000 INJECTION, SOLUTION INTRAMUSCULAR; SUBCUTANEOUS at 10:14

## 2018-01-29 ENCOUNTER — CLINICAL SUPPORT (OUTPATIENT)
Dept: FAMILY MEDICINE CLINIC | Facility: CLINIC | Age: 83
End: 2018-01-29

## 2018-01-29 DIAGNOSIS — E53.8 COBALAMIN DEFICIENCY: ICD-10-CM

## 2018-01-29 PROCEDURE — 96372 THER/PROPH/DIAG INJ SC/IM: CPT | Performed by: FAMILY MEDICINE

## 2018-01-29 RX ADMIN — CYANOCOBALAMIN 1000 MCG: 1000 INJECTION, SOLUTION INTRAMUSCULAR; SUBCUTANEOUS at 13:39

## 2018-02-05 ENCOUNTER — TELEPHONE (OUTPATIENT)
Dept: FAMILY MEDICINE CLINIC | Facility: CLINIC | Age: 83
End: 2018-02-05

## 2018-02-05 RX ORDER — ATORVASTATIN CALCIUM 10 MG/1
10 TABLET, FILM COATED ORAL DAILY
Qty: 90 TABLET | Refills: 3 | Status: SHIPPED | OUTPATIENT
Start: 2018-02-05 | End: 2019-02-06 | Stop reason: SDUPTHER

## 2018-03-01 ENCOUNTER — CLINICAL SUPPORT (OUTPATIENT)
Dept: FAMILY MEDICINE CLINIC | Facility: CLINIC | Age: 83
End: 2018-03-01

## 2018-03-01 DIAGNOSIS — E53.8 B12 DEFICIENCY: ICD-10-CM

## 2018-03-01 PROCEDURE — 96372 THER/PROPH/DIAG INJ SC/IM: CPT | Performed by: FAMILY MEDICINE

## 2018-03-01 RX ADMIN — CYANOCOBALAMIN 1000 MCG: 1000 INJECTION, SOLUTION INTRAMUSCULAR; SUBCUTANEOUS at 15:24

## 2018-03-30 ENCOUNTER — TELEPHONE (OUTPATIENT)
Dept: FAMILY MEDICINE CLINIC | Facility: CLINIC | Age: 83
End: 2018-03-30

## 2018-03-30 DIAGNOSIS — E03.8 OTHER SPECIFIED HYPOTHYROIDISM: ICD-10-CM

## 2018-03-30 RX ORDER — LEVOTHYROXINE SODIUM 112 UG/1
112 TABLET ORAL DAILY
Qty: 90 TABLET | Refills: 3 | Status: SHIPPED | OUTPATIENT
Start: 2018-03-30 | End: 2019-04-12 | Stop reason: SDUPTHER

## 2018-04-24 ENCOUNTER — OFFICE VISIT (OUTPATIENT)
Dept: FAMILY MEDICINE CLINIC | Facility: CLINIC | Age: 83
End: 2018-04-24

## 2018-04-24 VITALS
SYSTOLIC BLOOD PRESSURE: 142 MMHG | BODY MASS INDEX: 30.04 KG/M2 | WEIGHT: 159 LBS | TEMPERATURE: 97.3 F | HEART RATE: 84 BPM | OXYGEN SATURATION: 97 % | DIASTOLIC BLOOD PRESSURE: 80 MMHG

## 2018-04-24 DIAGNOSIS — H81.12 BENIGN PAROXYSMAL POSITIONAL VERTIGO OF LEFT EAR: ICD-10-CM

## 2018-04-24 DIAGNOSIS — E03.8 OTHER SPECIFIED HYPOTHYROIDISM: ICD-10-CM

## 2018-04-24 DIAGNOSIS — E53.8 COBALAMIN DEFICIENCY: ICD-10-CM

## 2018-04-24 DIAGNOSIS — G47.09 OTHER INSOMNIA: ICD-10-CM

## 2018-04-24 DIAGNOSIS — M25.512 CHRONIC PAIN OF BOTH SHOULDERS: ICD-10-CM

## 2018-04-24 DIAGNOSIS — M25.511 CHRONIC PAIN OF BOTH SHOULDERS: ICD-10-CM

## 2018-04-24 DIAGNOSIS — M81.0 AGE-RELATED OSTEOPOROSIS WITHOUT CURRENT PATHOLOGICAL FRACTURE: ICD-10-CM

## 2018-04-24 DIAGNOSIS — E78.49 OTHER HYPERLIPIDEMIA: ICD-10-CM

## 2018-04-24 DIAGNOSIS — G89.29 CHRONIC PAIN OF BOTH SHOULDERS: ICD-10-CM

## 2018-04-24 DIAGNOSIS — E55.9 VITAMIN D DEFICIENCY: ICD-10-CM

## 2018-04-24 DIAGNOSIS — I10 ESSENTIAL HYPERTENSION: Primary | ICD-10-CM

## 2018-04-24 LAB
HCT VFR BLDA CALC: 40.5 %
HGB BLDA-MCNC: 13.5 G/DL
MCH, POC: 29.1
MCHC, POC: 33.3
MCV, POC: 87.6
PLATELET # BLD AUTO: 167 10*3/MM3
PMV BLD: 9.4 FL
RBC, POC: 4.63
RDW, POC: 13.8
WBC # BLD: 6.4 10*3/UL

## 2018-04-24 PROCEDURE — 85027 COMPLETE CBC AUTOMATED: CPT | Performed by: FAMILY MEDICINE

## 2018-04-24 PROCEDURE — 77080 DXA BONE DENSITY AXIAL: CPT | Performed by: FAMILY MEDICINE

## 2018-04-24 PROCEDURE — 96372 THER/PROPH/DIAG INJ SC/IM: CPT | Performed by: FAMILY MEDICINE

## 2018-04-24 PROCEDURE — 99214 OFFICE O/P EST MOD 30 MIN: CPT | Performed by: FAMILY MEDICINE

## 2018-04-24 RX ORDER — TRAZODONE HYDROCHLORIDE 50 MG/1
50 TABLET ORAL NIGHTLY
Qty: 90 TABLET | Refills: 3 | Status: SHIPPED | OUTPATIENT
Start: 2018-04-24 | End: 2019-10-08

## 2018-04-24 RX ADMIN — CYANOCOBALAMIN 1000 MCG: 1000 INJECTION, SOLUTION INTRAMUSCULAR; SUBCUTANEOUS at 13:12

## 2018-04-24 NOTE — PROGRESS NOTES
Subjective   Kelvin Restrepo is a 87 y.o. female. Presents today for   Chief Complaint   Patient presents with   • Follow-up     needs dexascan scheduled.  Bilateral shoulder pain and dizzy spells when she lays flat.  Insomnia       Hypertension   This is a chronic problem. The current episode started more than 1 year ago. The problem is unchanged. The problem is controlled. Associated symptoms include malaise/fatigue. Pertinent negatives include no blurred vision, chest pain, orthopnea, palpitations, peripheral edema, PND or shortness of breath. There are no associated agents to hypertension. Risk factors for coronary artery disease include dyslipidemia and post-menopausal state. Past treatments include ACE inhibitors. Current antihypertension treatment includes ACE inhibitors. The current treatment provides moderate improvement. There are no compliance problems.  There is no history of CAD/MI or CVA. There is no history of chronic renal disease.   Hyperlipidemia   This is a chronic problem. The current episode started more than 1 year ago. The problem is controlled. Recent lipid tests were reviewed and are normal. Exacerbating diseases include hypothyroidism. She has no history of chronic renal disease. There are no known factors aggravating her hyperlipidemia. Pertinent negatives include no chest pain or shortness of breath. Current antihyperlipidemic treatment includes statins. The current treatment provides moderate improvement of lipids. There are no compliance problems.  Risk factors for coronary artery disease include dyslipidemia, hypertension and post-menopausal.   Dizziness   This is a recurrent problem. Episode onset: Couple months. The problem occurs intermittently. The problem has been waxing and waning. Associated symptoms include fatigue and vertigo. Pertinent negatives include no chest pain. Associated symptoms comments: Changes positions develops vertigo lasting few seconds then gone.  No slurred  speech, facial droops or loss of power one arm/leg or paresthesia.. Exacerbated by: quick head movement. She has tried nothing (Had therapy in remote past with relief) for the symptoms. The treatment provided no relief.   Upper Extremity Issue   This is a chronic problem. The current episode started more than 1 year ago. The problem occurs intermittently. The problem has been waxing and waning. Associated symptoms include fatigue and vertigo. Pertinent negatives include no chest pain. Exacerbated by: movement. She has tried NSAIDs, ice and heat for the symptoms. The treatment provided mild relief.   Hypothyroidism   This is a chronic problem. The current episode started more than 1 year ago. The problem occurs constantly. The problem has been unchanged. Associated symptoms include fatigue and vertigo. Pertinent negatives include no chest pain. Nothing aggravates the symptoms. Treatments tried: Due for check, on levothyroxine. The treatment provided moderate relief.   Osteoporosis  Due for dexa scan, not had in long time.  Low vitamin D, due recheck    Hx of b12 def, due for injection today    Review of Systems   Constitutional: Positive for fatigue and malaise/fatigue.   Eyes: Negative for blurred vision.   Respiratory: Negative for shortness of breath.    Cardiovascular: Negative for chest pain, palpitations, orthopnea and PND.   Neurological: Positive for dizziness and vertigo.       The following portions of the patient's history were reviewed and updated as appropriate: allergies, current medications, past medical history and problem list.    Patient Active Problem List   Diagnosis   • Suprapubic pain   • Allergic state   • Chronic constipation   • Depression   • Gastroesophageal reflux disease   • Fatigue   • Hyperlipidemia   • Hypothyroidism   • Insomnia   • Obstructive sleep apnea syndrome   • Osteoarthritis of multiple joints   • Osteoporosis   • Cobalamin deficiency   • Essential hypertension   • Anxiety        Allergies   Allergen Reactions   • Amlodipine      Patient does not recall ever having a reaction to this medication    • Prednisone Hives       Current Outpatient Prescriptions on File Prior to Visit   Medication Sig Dispense Refill   • aspirin 325 MG tablet Take  by mouth daily.     • atorvastatin (LIPITOR) 10 MG tablet Take 1 tablet by mouth Daily. 90 tablet 3   • B Complex-Folic Acid (B COMPLEX-VITAMIN B12 PO) Take  by mouth.     • cetirizine (ZyrTEC) 10 MG tablet Take 10 mg by mouth Daily.     • gabapentin (NEURONTIN) 100 MG capsule Take 1 capsule by mouth 3 (Three) Times a Day. 90 capsule 0   • levothyroxine (SYNTHROID, LEVOTHROID) 112 MCG tablet Take 1 tablet by mouth Daily. 90 tablet 3   • lisinopril (PRINIVIL,ZESTRIL) 20 MG tablet Take 0.5 tablets by mouth Daily. 90 tablet 3   • meclizine 25 MG chewable tablet chewable tablet Chew 1 tablet 3 (three) times a day as needed (dizziness). 90 tablet 1   • Melatonin 1 MG capsule Take 1 mg by mouth Daily. 90 each 3   • montelukast (SINGULAIR) 10 MG tablet Take 10 mg by mouth Daily.     • Multiple Minerals-Vitamins (CALCIUM & VIT D3 BONE HEALTH PO) Take  by mouth.     • pantoprazole (PROTONIX) 40 MG EC tablet Take 1 tablet by mouth Daily. 30 tablet 11   • ranitidine (ZANTAC) 150 MG tablet Take 150 mg by mouth 2 (two) times a day.     • tiZANidine (ZANAFLEX) 2 MG tablet 1/2 to 1 every 12 hours prn back pain 60 tablet 5   • triamcinolone (KENALOG) 0.025 % cream Apply to affected area twice daily for 1 week 30 g 0   • valACYclovir (VALTREX) 1000 MG tablet Take 1 tablet by mouth 3 (Three) Times a Day. 21 tablet 0   • vitamin C (ASCORBIC ACID) 500 MG tablet Take 1,000 mg by mouth daily.     • [DISCONTINUED] LORazepam (ATIVAN) 0.5 MG tablet 1/2 po nightly x 14 days, then 1/2 Sun, Tue, THur, Sat x 14 days, 1/2 Tue, Thurs x 14 day, 1/2 Tues x14 days then stop. 14 tablet 0     Current Facility-Administered Medications on File Prior to Visit   Medication Dose Route  Frequency Provider Last Rate Last Dose   • cyanocobalamin injection 1,000 mcg  1,000 mcg Intramuscular Q28 Days Leoncio Stoll DO   1,000 mcg at 04/24/18 1312       Objective   Vitals:    04/24/18 1256   BP: 142/80   Pulse: 84   Temp: 97.3 °F (36.3 °C)   SpO2: 97%   Weight: 72.1 kg (159 lb)       Physical Exam   Constitutional: She appears well-developed and well-nourished.   HENT:   Head: Normocephalic and atraumatic.   Neck: Neck supple. No JVD present. No thyromegaly present.   Cardiovascular: Normal rate, regular rhythm and normal heart sounds.  Exam reveals no gallop and no friction rub.    No murmur heard.  Pulmonary/Chest: Effort normal and breath sounds normal. No respiratory distress. She has no wheezes. She has no rales.   Abdominal: Soft. Bowel sounds are normal. She exhibits no distension. There is no tenderness. There is no rebound and no guarding.   Musculoskeletal: She exhibits no edema.   Neurological: She is alert.   Skin: Skin is warm and dry.   Psychiatric: She has a normal mood and affect. Her behavior is normal.   Nursing note and vitals reviewed.      CBC ordered and reviewed.    Assessment/Plan   Kelvin was seen today for follow-up.    Diagnoses and all orders for this visit:    Essential hypertension  -     Comprehensive Metabolic Panel  -     Lipid Panel  -     TSH    Cobalamin deficiency  -     POC CBC    Other hyperlipidemia  -     Comprehensive Metabolic Panel  -     Lipid Panel  -     TSH    Other specified hypothyroidism  -     Comprehensive Metabolic Panel  -     Lipid Panel  -     TSH    Age-related osteoporosis without current pathological fracture  -     Vitamin D 25 Hydroxy  -     DEXA Bone Density Axial    Vitamin D deficiency  -     Vitamin D 25 Hydroxy    Other insomnia  -     traZODone (DESYREL) 50 MG tablet; Take 1 tablet by mouth Every Night.    Chronic pain of both shoulders  -     diclofenac (VOLTAREN) 1 % gel gel; Apply 4 g topically 4 (Four) Times a Day As Needed  (shoulder pain).    Benign paroxysmal positional vertigo of left ear  -     Ambulatory Referral to Physical Therapy Evaluate and treat    -recheck vitamin D and dexa scan  -refer to PT for BPV, helped in past  -hypertension - controlled, continue medications  -HLD - continue statin, recheck lipids.  -hypothyroidism - continue medication, recheck thyroid labs.  -up date B12 today  -will try voltaren gel for pain         -Follow up: 6 months       Current Outpatient Prescriptions:   •  aspirin 325 MG tablet, Take  by mouth daily., Disp: , Rfl:   •  atorvastatin (LIPITOR) 10 MG tablet, Take 1 tablet by mouth Daily., Disp: 90 tablet, Rfl: 3  •  B Complex-Folic Acid (B COMPLEX-VITAMIN B12 PO), Take  by mouth., Disp: , Rfl:   •  cetirizine (ZyrTEC) 10 MG tablet, Take 10 mg by mouth Daily., Disp: , Rfl:   •  gabapentin (NEURONTIN) 100 MG capsule, Take 1 capsule by mouth 3 (Three) Times a Day., Disp: 90 capsule, Rfl: 0  •  levothyroxine (SYNTHROID, LEVOTHROID) 112 MCG tablet, Take 1 tablet by mouth Daily., Disp: 90 tablet, Rfl: 3  •  lisinopril (PRINIVIL,ZESTRIL) 20 MG tablet, Take 0.5 tablets by mouth Daily., Disp: 90 tablet, Rfl: 3  •  meclizine 25 MG chewable tablet chewable tablet, Chew 1 tablet 3 (three) times a day as needed (dizziness)., Disp: 90 tablet, Rfl: 1  •  Melatonin 1 MG capsule, Take 1 mg by mouth Daily., Disp: 90 each, Rfl: 3  •  montelukast (SINGULAIR) 10 MG tablet, Take 10 mg by mouth Daily., Disp: , Rfl:   •  Multiple Minerals-Vitamins (CALCIUM & VIT D3 BONE HEALTH PO), Take  by mouth., Disp: , Rfl:   •  pantoprazole (PROTONIX) 40 MG EC tablet, Take 1 tablet by mouth Daily., Disp: 30 tablet, Rfl: 11  •  ranitidine (ZANTAC) 150 MG tablet, Take 150 mg by mouth 2 (two) times a day., Disp: , Rfl:   •  tiZANidine (ZANAFLEX) 2 MG tablet, 1/2 to 1 every 12 hours prn back pain, Disp: 60 tablet, Rfl: 5  •  triamcinolone (KENALOG) 0.025 % cream, Apply to affected area twice daily for 1 week, Disp: 30 g, Rfl: 0  •   valACYclovir (VALTREX) 1000 MG tablet, Take 1 tablet by mouth 3 (Three) Times a Day., Disp: 21 tablet, Rfl: 0  •  vitamin C (ASCORBIC ACID) 500 MG tablet, Take 1,000 mg by mouth daily., Disp: , Rfl:     Current Facility-Administered Medications:   •  cyanocobalamin injection 1,000 mcg, 1,000 mcg, Intramuscular, Q28 Days, Leoncio Stoll DO, 1,000 mcg at 04/24/18 1246

## 2018-04-25 LAB
25(OH)D3+25(OH)D2 SERPL-MCNC: 25.3 NG/ML (ref 30–100)
ALBUMIN SERPL-MCNC: 4.5 G/DL (ref 3.5–4.7)
ALBUMIN/GLOB SERPL: 1.7 {RATIO} (ref 1.2–2.2)
ALP SERPL-CCNC: 67 IU/L (ref 39–117)
ALT SERPL-CCNC: 23 IU/L (ref 0–32)
AST SERPL-CCNC: 28 IU/L (ref 0–40)
BILIRUB SERPL-MCNC: 0.5 MG/DL (ref 0–1.2)
BUN SERPL-MCNC: 16 MG/DL (ref 8–27)
BUN/CREAT SERPL: 18 (ref 12–28)
CALCIUM SERPL-MCNC: 9.5 MG/DL (ref 8.7–10.3)
CHLORIDE SERPL-SCNC: 100 MMOL/L (ref 96–106)
CHOLEST SERPL-MCNC: 159 MG/DL (ref 100–199)
CO2 SERPL-SCNC: 26 MMOL/L (ref 18–29)
CREAT SERPL-MCNC: 0.87 MG/DL (ref 0.57–1)
GFR SERPLBLD CREATININE-BSD FMLA CKD-EPI: 60 ML/MIN/1.73
GFR SERPLBLD CREATININE-BSD FMLA CKD-EPI: 69 ML/MIN/1.73
GLOBULIN SER CALC-MCNC: 2.6 G/DL (ref 1.5–4.5)
GLUCOSE SERPL-MCNC: 73 MG/DL (ref 65–99)
HDLC SERPL-MCNC: 37 MG/DL
LDLC SERPL CALC-MCNC: 85 MG/DL (ref 0–99)
POTASSIUM SERPL-SCNC: 4.2 MMOL/L (ref 3.5–5.2)
PROT SERPL-MCNC: 7.1 G/DL (ref 6–8.5)
SODIUM SERPL-SCNC: 143 MMOL/L (ref 134–144)
TRIGL SERPL-MCNC: 187 MG/DL (ref 0–149)
TSH SERPL DL<=0.005 MIU/L-ACNC: 2.73 UIU/ML (ref 0.45–4.5)
VLDLC SERPL CALC-MCNC: 37 MG/DL (ref 5–40)

## 2018-04-25 NOTE — PROGRESS NOTES
Call and mail copy of results to patient.  Vitamin D low, start ergocalciferol 50,000 iu po weekly x 12 weeks, then vitamin D3 2000 iu po daily  Kidney and liver function normal  Blood counts okay.  Thyroid appropriate range  Cholesterol looks good

## 2018-04-27 RX ORDER — ERGOCALCIFEROL 1.25 MG/1
50000 CAPSULE ORAL WEEKLY
Qty: 12 CAPSULE | Refills: 0 | Status: SHIPPED | OUTPATIENT
Start: 2018-04-27 | End: 2018-10-10

## 2018-05-22 RX ORDER — IBANDRONATE SODIUM 150 MG/1
150 TABLET, FILM COATED ORAL
Qty: 3 TABLET | Refills: 3 | Status: SHIPPED | OUTPATIENT
Start: 2018-05-22 | End: 2018-10-10

## 2018-05-30 ENCOUNTER — CLINICAL SUPPORT (OUTPATIENT)
Dept: FAMILY MEDICINE CLINIC | Facility: CLINIC | Age: 83
End: 2018-05-30

## 2018-05-30 DIAGNOSIS — E53.8 COBALAMIN DEFICIENCY: ICD-10-CM

## 2018-05-30 PROCEDURE — 96372 THER/PROPH/DIAG INJ SC/IM: CPT | Performed by: FAMILY MEDICINE

## 2018-05-30 RX ADMIN — CYANOCOBALAMIN 1000 MCG: 1000 INJECTION, SOLUTION INTRAMUSCULAR; SUBCUTANEOUS at 15:44

## 2018-06-01 ENCOUNTER — OFFICE VISIT (OUTPATIENT)
Dept: FAMILY MEDICINE CLINIC | Facility: CLINIC | Age: 83
End: 2018-06-01

## 2018-06-01 VITALS
SYSTOLIC BLOOD PRESSURE: 110 MMHG | DIASTOLIC BLOOD PRESSURE: 88 MMHG | WEIGHT: 158 LBS | HEART RATE: 88 BPM | BODY MASS INDEX: 29.85 KG/M2 | OXYGEN SATURATION: 98 % | TEMPERATURE: 98.1 F

## 2018-06-01 DIAGNOSIS — K21.9 GASTROESOPHAGEAL REFLUX DISEASE, ESOPHAGITIS PRESENCE NOT SPECIFIED: Primary | ICD-10-CM

## 2018-06-01 DIAGNOSIS — E53.8 COBALAMIN DEFICIENCY: ICD-10-CM

## 2018-06-01 DIAGNOSIS — R53.82 CHRONIC FATIGUE: ICD-10-CM

## 2018-06-01 PROCEDURE — 99213 OFFICE O/P EST LOW 20 MIN: CPT | Performed by: NURSE PRACTITIONER

## 2018-06-01 NOTE — PROGRESS NOTES
Subjective   Kelvin Restrepo is a 87 y.o. female who presents c/o fatigue, weakness, and overall feeling bad x several weeks.     History of Present Illness   Onset precedes 4/24/18 office visit. Reports general weakness, gradually worsening. Feels like did before started B12 injections. Though takes monthly and daily oral supplement. Not sick or in pain, just feels bad. REflux much worse since formulary change from nexium to protonix.   The following portions of the patient's history were reviewed and updated as appropriate: allergies, current medications, past family history, past medical history, past social history, past surgical history and problem list.    Review of Systems   Constitutional: Positive for activity change (less active of late, intermittent loss of energy.) and fatigue. Negative for appetite change, chills and fever.   HENT: Negative for congestion.         No recent URI   Eyes: Negative for visual disturbance.   Respiratory: Positive for shortness of breath (with exertion).    Cardiovascular: Negative for chest pain and palpitations.   Gastrointestinal: Positive for GERD (off nexuim, takes ranitidine for hives, protonix for reflux.).   Musculoskeletal: Positive for arthralgias (shoulders, better of late).   Skin: Positive for rash (hives lately, responded to 20mg prednisone lately).   Neurological: Positive for dizziness (has resolved). Negative for light-headedness and headache.   Psychiatric/Behavioral: Negative for depressed mood.     /88 (BP Location: Left arm, Patient Position: Standing, Cuff Size: Adult)   Pulse 88   Temp 98.1 °F (36.7 °C) (Oral)   Wt 71.7 kg (158 lb)   SpO2 98%   BMI 29.85 kg/m²     Objective   Physical Exam   Constitutional: She is oriented to person, place, and time. She appears well-developed and well-nourished.   Neck: Normal range of motion. Neck supple. No thyromegaly present.   Cardiovascular: Normal rate, regular rhythm and normal heart sounds.     Pulmonary/Chest: Effort normal and breath sounds normal.   Lymphadenopathy:     She has no cervical adenopathy.   Neurological: She is alert and oriented to person, place, and time. She has normal strength. She displays a negative Romberg sign. Gait (unsteady) abnormal.   Skin: Skin is warm and dry.   Psychiatric: She has a normal mood and affect. Her behavior is normal. Judgment and thought content normal.   Nursing note and vitals reviewed.    Assessment/Plan   Problems Addressed this Visit        Digestive    Gastroesophageal reflux disease - Primary    Relevant Orders    Helicobacter Pylori, IgA IgG IgM    Cobalamin deficiency    Relevant Orders    Vitamin B12       Other    Fatigue    Relevant Orders    CBC & Differential    Comprehensive Metabolic Panel    Magnesium        Findings nonspecific, as increase in reflux, recommend screen for h pylori, recheck CBC  B12 deficiency--check B12 levels  Orthostatic BP normal.   FU if not settling 1 month

## 2018-06-04 LAB
ALBUMIN SERPL-MCNC: 4 G/DL (ref 3.5–5.2)
ALBUMIN/GLOB SERPL: 1.4 G/DL
ALP SERPL-CCNC: 64 U/L (ref 39–117)
ALT SERPL-CCNC: 16 U/L (ref 1–33)
AST SERPL-CCNC: 13 U/L (ref 1–32)
BASOPHILS # BLD AUTO: 0 10*3/MM3 (ref 0–0.2)
BASOPHILS NFR BLD AUTO: 0 % (ref 0–1.5)
BILIRUB SERPL-MCNC: 0.5 MG/DL (ref 0.1–1.2)
BUN SERPL-MCNC: 16 MG/DL (ref 8–23)
BUN/CREAT SERPL: 18.4 (ref 7–25)
CALCIUM SERPL-MCNC: 9.4 MG/DL (ref 8.6–10.5)
CHLORIDE SERPL-SCNC: 100 MMOL/L (ref 98–107)
CO2 SERPL-SCNC: 27.3 MMOL/L (ref 22–29)
CREAT SERPL-MCNC: 0.87 MG/DL (ref 0.57–1)
EOSINOPHIL # BLD AUTO: 0 10*3/MM3 (ref 0–0.7)
EOSINOPHIL NFR BLD AUTO: 0 % (ref 0.3–6.2)
ERYTHROCYTE [DISTWIDTH] IN BLOOD BY AUTOMATED COUNT: 13.2 % (ref 11.7–13)
GFR SERPLBLD CREATININE-BSD FMLA CKD-EPI: 62 ML/MIN/1.73
GFR SERPLBLD CREATININE-BSD FMLA CKD-EPI: 75 ML/MIN/1.73
GLOBULIN SER CALC-MCNC: 2.9 GM/DL
GLUCOSE SERPL-MCNC: 74 MG/DL (ref 65–99)
H PYLORI IGA SER-ACNC: <9 UNITS (ref 0–8.9)
H PYLORI IGG SER IA-ACNC: 0.24 INDEX VALUE (ref 0–0.79)
H PYLORI IGM SER-ACNC: <9 UNITS (ref 0–8.9)
HCT VFR BLD AUTO: 42.4 % (ref 35.6–45.5)
HGB BLD-MCNC: 13.5 G/DL (ref 11.9–15.5)
IMM GRANULOCYTES # BLD: 0.04 10*3/MM3 (ref 0–0.03)
IMM GRANULOCYTES NFR BLD: 0.5 % (ref 0–0.5)
LYMPHOCYTES # BLD AUTO: 1.47 10*3/MM3 (ref 0.9–4.8)
LYMPHOCYTES NFR BLD AUTO: 17.4 % (ref 19.6–45.3)
MAGNESIUM SERPL-MCNC: 2.2 MG/DL (ref 1.6–2.4)
MCH RBC QN AUTO: 29.3 PG (ref 26.9–32)
MCHC RBC AUTO-ENTMCNC: 31.8 G/DL (ref 32.4–36.3)
MCV RBC AUTO: 92 FL (ref 80.5–98.2)
MONOCYTES # BLD AUTO: 1.01 10*3/MM3 (ref 0.2–1.2)
MONOCYTES NFR BLD AUTO: 11.9 % (ref 5–12)
NEUTROPHILS # BLD AUTO: 5.98 10*3/MM3 (ref 1.9–8.1)
NEUTROPHILS NFR BLD AUTO: 70.7 % (ref 42.7–76)
PLATELET # BLD AUTO: 207 10*3/MM3 (ref 140–500)
POTASSIUM SERPL-SCNC: 4.3 MMOL/L (ref 3.5–5.2)
PROT SERPL-MCNC: 6.9 G/DL (ref 6–8.5)
RBC # BLD AUTO: 4.61 10*6/MM3 (ref 3.9–5.2)
SODIUM SERPL-SCNC: 140 MMOL/L (ref 136–145)
VIT B12 SERPL-MCNC: 1419 PG/ML (ref 211–946)
WBC # BLD AUTO: 8.46 10*3/MM3 (ref 4.5–10.7)

## 2018-07-13 ENCOUNTER — CLINICAL SUPPORT (OUTPATIENT)
Dept: FAMILY MEDICINE CLINIC | Facility: CLINIC | Age: 83
End: 2018-07-13

## 2018-07-13 DIAGNOSIS — E53.8 COBALAMIN DEFICIENCY: ICD-10-CM

## 2018-07-13 PROCEDURE — 96372 THER/PROPH/DIAG INJ SC/IM: CPT | Performed by: FAMILY MEDICINE

## 2018-07-13 RX ADMIN — CYANOCOBALAMIN 1000 MCG: 1000 INJECTION, SOLUTION INTRAMUSCULAR; SUBCUTANEOUS at 11:27

## 2018-08-14 ENCOUNTER — CLINICAL SUPPORT (OUTPATIENT)
Dept: FAMILY MEDICINE CLINIC | Facility: CLINIC | Age: 83
End: 2018-08-14

## 2018-08-14 DIAGNOSIS — E53.8 COBALAMIN DEFICIENCY: ICD-10-CM

## 2018-08-14 PROCEDURE — 96372 THER/PROPH/DIAG INJ SC/IM: CPT | Performed by: FAMILY MEDICINE

## 2018-08-14 RX ADMIN — CYANOCOBALAMIN 1000 MCG: 1000 INJECTION, SOLUTION INTRAMUSCULAR; SUBCUTANEOUS at 15:30

## 2018-08-16 ENCOUNTER — OFFICE VISIT (OUTPATIENT)
Dept: FAMILY MEDICINE CLINIC | Facility: CLINIC | Age: 83
End: 2018-08-16

## 2018-08-16 VITALS
BODY MASS INDEX: 30.21 KG/M2 | TEMPERATURE: 97.8 F | HEIGHT: 61 IN | SYSTOLIC BLOOD PRESSURE: 138 MMHG | OXYGEN SATURATION: 96 % | HEART RATE: 86 BPM | DIASTOLIC BLOOD PRESSURE: 72 MMHG | WEIGHT: 160 LBS

## 2018-08-16 DIAGNOSIS — K21.9 GASTROESOPHAGEAL REFLUX DISEASE, ESOPHAGITIS PRESENCE NOT SPECIFIED: ICD-10-CM

## 2018-08-16 DIAGNOSIS — M79.2 RADICULAR PAIN IN RIGHT ARM: Primary | ICD-10-CM

## 2018-08-16 DIAGNOSIS — M81.0 AGE-RELATED OSTEOPOROSIS WITHOUT CURRENT PATHOLOGICAL FRACTURE: ICD-10-CM

## 2018-08-16 PROCEDURE — 72050 X-RAY EXAM NECK SPINE 4/5VWS: CPT | Performed by: NURSE PRACTITIONER

## 2018-08-16 PROCEDURE — 99213 OFFICE O/P EST LOW 20 MIN: CPT | Performed by: NURSE PRACTITIONER

## 2018-08-16 RX ORDER — MELOXICAM 7.5 MG/1
7.5 TABLET ORAL DAILY
Qty: 14 TABLET | Refills: 0 | Status: SHIPPED | OUTPATIENT
Start: 2018-08-16 | End: 2018-10-23

## 2018-08-16 NOTE — PROGRESS NOTES
"Subjective   Kelvin Restrepo is a 87 y.o. female. R arm pain x 6 months or so.     History of Present Illness   R arm pain described as pain mostly in Triceps area extending to the elbow. Relief with elevating arm. Not much neck pain. Does burn at times, other times like a severe cramp. Takes advil for the pain, some help. And topical rub providing instant relief. Heat and ice also used. Pain initially intermittent for the last year, more constant unless positions in the last 6 months.     nexium is only to control reflux in the past. Always insufficient control with protonix, or prevacid.   The following portions of the patient's history were reviewed and updated as appropriate: allergies, current medications, past family history, past medical history, past social history, past surgical history and problem list.    Review of Systems   Constitutional: Negative.  Negative for activity change, appetite change and unexpected weight gain.   Respiratory: Negative for shortness of breath.    Cardiovascular: Negative.    Gastrointestinal: Positive for GERD and indigestion. Negative for abdominal pain, nausea and vomiting.   Musculoskeletal: Positive for back pain (does have Degenerative arthritis in the spine, has trouble at base of neck, bra line and low back. ) and neck pain. Negative for arthralgias (no shoulder joint pain).   Neurological: Negative for weakness, numbness (occasional tingling to R hand) and headache.   Psychiatric/Behavioral: Negative.      /72   Pulse 86   Temp 97.8 °F (36.6 °C) (Oral)   Ht 154.9 cm (60.98\")   Wt 72.6 kg (160 lb)   SpO2 96%   BMI 30.25 kg/m²     Objective   Physical Exam   Constitutional: She appears well-developed and well-nourished. No distress.   Cardiovascular: Normal rate.    Pulmonary/Chest: Effort normal.   Musculoskeletal:        Right shoulder: She exhibits tenderness (trapezius only). She exhibits normal range of motion.        Cervical back: She exhibits decreased " range of motion, tenderness, pain and spasm.   Neurological: She has normal strength. No cranial nerve deficit or sensory deficit.   Reflex Scores:       Tricep reflexes are 2+ on the right side and 2+ on the left side.       Bicep reflexes are 2+ on the right side and 2+ on the left side.       Brachioradialis reflexes are 2+ on the right side and 2+ on the left side.  Skin: Skin is warm and dry.   Psychiatric: She has a normal mood and affect. Her behavior is normal. Judgment and thought content normal.   Nursing note and vitals reviewed.    Assessment/Plan   Problems Addressed this Visit        Digestive    Gastroesophageal reflux disease       Musculoskeletal and Integument    Osteoporosis      Other Visit Diagnoses     Radicular pain in right arm    -  Primary    Relevant Orders    XR Spine Cervical Complete 4 or 5 View        Osteoporosis--Not taking boniva, as worried about reflux, esophagus problems, and jaw problems. Concerned about possibility of worsening joint pain. No history of ulcers. Consider trying, but strongly cautioned if worsening reflux to D/C. Upright precautions discussed.     Radicular pain--Xrays with degenerative disc disease, no comparison, will send to radiology for opinion. Cautious trial of short course mobic to address pain. Possible PT. Ok to continue osteo bi flex.

## 2018-10-10 ENCOUNTER — OFFICE VISIT (OUTPATIENT)
Dept: FAMILY MEDICINE CLINIC | Facility: CLINIC | Age: 83
End: 2018-10-10

## 2018-10-10 VITALS
TEMPERATURE: 98.4 F | SYSTOLIC BLOOD PRESSURE: 128 MMHG | HEART RATE: 83 BPM | OXYGEN SATURATION: 95 % | BODY MASS INDEX: 29.83 KG/M2 | WEIGHT: 158 LBS | HEIGHT: 61 IN | DIASTOLIC BLOOD PRESSURE: 64 MMHG

## 2018-10-10 DIAGNOSIS — H81.10 BPPV (BENIGN PAROXYSMAL POSITIONAL VERTIGO), UNSPECIFIED LATERALITY: Primary | ICD-10-CM

## 2018-10-10 DIAGNOSIS — Z23 IMMUNIZATION DUE: ICD-10-CM

## 2018-10-10 PROCEDURE — G0008 ADMIN INFLUENZA VIRUS VAC: HCPCS | Performed by: NURSE PRACTITIONER

## 2018-10-10 PROCEDURE — 99213 OFFICE O/P EST LOW 20 MIN: CPT | Performed by: NURSE PRACTITIONER

## 2018-10-10 PROCEDURE — 90662 IIV NO PRSV INCREASED AG IM: CPT | Performed by: NURSE PRACTITIONER

## 2018-10-10 PROCEDURE — 96372 THER/PROPH/DIAG INJ SC/IM: CPT | Performed by: NURSE PRACTITIONER

## 2018-10-10 RX ORDER — FEXOFENADINE HCL 180 MG/1
180 TABLET ORAL DAILY
COMMUNITY
End: 2020-11-12

## 2018-10-10 RX ADMIN — CYANOCOBALAMIN 1000 MCG: 1000 INJECTION, SOLUTION INTRAMUSCULAR; SUBCUTANEOUS at 14:13

## 2018-10-10 NOTE — PROGRESS NOTES
"Yudy Restrepo is a 87 y.o. female who presents c/o dizzy spells x 3 weeks. Occurs mostly when laying down or sitting up.     History of Present Illness   Like head spinning, if closes eyes and turns head to the right will resolve. Therapy in the past had settled. No palpitations. No head congestion.   The following portions of the patient's history were reviewed and updated as appropriate: allergies, current medications, past family history, past medical history, past social history, past surgical history and problem list.    Review of Systems   Constitutional: Negative for chills and fever.   HENT: Negative for congestion.    Eyes: Negative for visual disturbance.   Respiratory: Negative.    Cardiovascular: Negative.    Gastrointestinal: Negative.    Endocrine: Negative.    Allergic/Immunologic: Negative.    Neurological: Positive for dizziness (true vertigo). Negative for light-headedness.   Hematological: Negative.    Psychiatric/Behavioral: Negative.      /64   Pulse 83   Temp 98.4 °F (36.9 °C) (Oral)   Ht 154.9 cm (61\")   Wt 71.7 kg (158 lb)   SpO2 95%   BMI 29.85 kg/m²     Objective   Physical Exam   Constitutional: She appears well-developed and well-nourished.   HENT:   Right Ear: Tympanic membrane normal.   Left Ear: Tympanic membrane normal.   Neck: Normal range of motion. Neck supple. No thyromegaly present.   Cardiovascular: Normal rate, regular rhythm and normal heart sounds.    Pulmonary/Chest: Effort normal and breath sounds normal.   Lymphadenopathy:     She has no cervical adenopathy.   Skin: Skin is warm and dry.   Psychiatric: She has a normal mood and affect. Her behavior is normal. Judgment and thought content normal.   Nursing note and vitals reviewed.    Assessment/Plan   Problems Addressed this Visit     None      Visit Diagnoses     BPPV (benign paroxysmal positional vertigo), unspecified laterality    -  Primary    Immunization due        Relevant Orders    Flu " Vaccine High Dose PF 65YR+ ([id number]) (Completed)        BPPV--will restart exercises, call if needs PT  B12 def--inject today.   FU if all not settling 1 week.

## 2018-10-23 ENCOUNTER — OFFICE VISIT (OUTPATIENT)
Dept: FAMILY MEDICINE CLINIC | Facility: CLINIC | Age: 83
End: 2018-10-23

## 2018-10-23 VITALS
OXYGEN SATURATION: 98 % | HEART RATE: 89 BPM | SYSTOLIC BLOOD PRESSURE: 128 MMHG | HEIGHT: 61 IN | BODY MASS INDEX: 30.02 KG/M2 | DIASTOLIC BLOOD PRESSURE: 82 MMHG | TEMPERATURE: 97.5 F | WEIGHT: 159 LBS

## 2018-10-23 DIAGNOSIS — E55.9 VITAMIN D DEFICIENCY: ICD-10-CM

## 2018-10-23 DIAGNOSIS — E78.49 OTHER HYPERLIPIDEMIA: ICD-10-CM

## 2018-10-23 DIAGNOSIS — I10 ESSENTIAL HYPERTENSION: ICD-10-CM

## 2018-10-23 DIAGNOSIS — E03.8 OTHER SPECIFIED HYPOTHYROIDISM: ICD-10-CM

## 2018-10-23 DIAGNOSIS — L24.81 IRRITANT CONTACT DERMATITIS DUE TO METALS: ICD-10-CM

## 2018-10-23 DIAGNOSIS — Z00.00 MEDICARE ANNUAL WELLNESS VISIT, SUBSEQUENT: Primary | ICD-10-CM

## 2018-10-23 DIAGNOSIS — M81.0 AGE-RELATED OSTEOPOROSIS WITHOUT CURRENT PATHOLOGICAL FRACTURE: ICD-10-CM

## 2018-10-23 DIAGNOSIS — M15.9 PRIMARY OSTEOARTHRITIS INVOLVING MULTIPLE JOINTS: ICD-10-CM

## 2018-10-23 PROCEDURE — G0439 PPPS, SUBSEQ VISIT: HCPCS | Performed by: FAMILY MEDICINE

## 2018-10-23 RX ORDER — TRIAMCINOLONE ACETONIDE 1 MG/G
CREAM TOPICAL 2 TIMES DAILY
Qty: 30 G | Refills: 0 | Status: SHIPPED | OUTPATIENT
Start: 2018-10-23 | End: 2018-10-23 | Stop reason: SDUPTHER

## 2018-10-23 RX ORDER — NAPROXEN 500 MG/1
500 TABLET ORAL 2 TIMES DAILY PRN
Qty: 180 TABLET | Refills: 1 | Status: SHIPPED | OUTPATIENT
Start: 2018-10-23 | End: 2020-01-31

## 2018-10-23 RX ORDER — TRIAMCINOLONE ACETONIDE 1 MG/G
CREAM TOPICAL 2 TIMES DAILY
Qty: 30 G | Refills: 0 | Status: SHIPPED | OUTPATIENT
Start: 2018-10-23 | End: 2020-01-31

## 2018-10-23 NOTE — PATIENT INSTRUCTIONS
Medicare Wellness  Personal Prevention Plan of Service     Date of Office Visit:  10/23/2018  Encounter Provider:  Leoncio Stoll DO  Place of Service:  [facility]  Patient Name: Kelvin Restrepo  :  3/19/1931    As part of the Medicare Wellness portion of your visit today, we are providing you with this personalized preventive plan of services (PPPS). This plan is based upon recommendations of the [place] Preventive Services Task Force (USPSTF) and the Advisory Committee on Immunization Practices (ACIP).    This lists the preventive care services that should be considered, and provides dates of when you are due. Items listed as completed are up-to-date and do not require any further intervention.    Health Maintenance   Topic Date Due   • ZOSTER VACCINE (2 of 2) 11/10/2016   • PNEUMOCOCCAL VACCINES (65+ LOW/MEDIUM RISK) (2 of 2 - PPSV23) 09/15/2017   • MAMMOGRAM  10/21/2018   • LIPID PANEL  2019   • MEDICARE ANNUAL WELLNESS  10/23/2019   • DXA SCAN  05/15/2020   • TDAP/TD VACCINES (2 - Td) 09/15/2026   • INFLUENZA VACCINE  Completed       Orders Placed This Encounter   Procedures   • Comprehensive Metabolic Panel   • Lipid Panel   • Vitamin D 25 Hydroxy       Return in about 6 months (around 2019), or if symptoms worsen or fail to improve.      Exercising to Lose Weight  Exercising can help you to lose weight. In order to lose weight through exercise, you need to do vigorous-intensity exercise. You can tell that you are exercising with vigorous intensity if you are breathing very hard and fast and cannot hold a conversation while exercising.  Moderate-intensity exercise helps to maintain your current weight. You can tell that you are exercising at a moderate level if you have a higher heart rate and faster breathing, but you are still able to hold a conversation.  How often should I exercise?  Choose an activity that you enjoy and set realistic goals. Your  health care provider can help you to make an activity plan that works for you. Exercise regularly as directed by your health care provider. This may include:  · Doing resistance training twice each week, such as:  ? Push-ups.  ? Sit-ups.  ? Lifting weights.  ? Using resistance bands.  · Doing a given intensity of exercise for a given amount of time. Choose from these options:  ? 150 minutes of moderate-intensity exercise every week.  ? 75 minutes of vigorous-intensity exercise every week.  ? A mix of moderate-intensity and vigorous-intensity exercise every week.    Children, pregnant women, people who are out of shape, people who are overweight, and older adults may need to consult a health care provider for individual recommendations. If you have any sort of medical condition, be sure to consult your health care provider before starting a new exercise program.  What are some activities that can help me to lose weight?  · Walking at a rate of at least 4.5 miles an hour.  · Jogging or running at a rate of 5 miles per hour.  · Biking at a rate of at least 10 miles per hour.  · Lap swimming.  · Roller-skating or in-line skating.  · Cross-country skiing.  · Vigorous competitive sports, such as football, basketball, and soccer.  · Jumping rope.  · Aerobic dancing.  How can I be more active in my day-to-day activities?  · Use the stairs instead of the elevator.  · Take a walk during your lunch break.  · If you drive, park your car farther away from work or school.  · If you take public transportation, get off one stop early and walk the rest of the way.  · Make all of your phone calls while standing up and walking around.  · Get up, stretch, and walk around every 30 minutes throughout the day.  What guidelines should I follow while exercising?  · Do not exercise so much that you hurt yourself, feel dizzy, or get very short of breath.  · Consult your health care provider prior to starting a new exercise program.  · Wear  comfortable clothes and shoes with good support.  · Drink plenty of water while you exercise to prevent dehydration or heat stroke. Body water is lost during exercise and must be replaced.  · Work out until you breathe faster and your heart beats faster.  This information is not intended to replace advice given to you by your health care provider. Make sure you discuss any questions you have with your health care provider.  Document Released: 01/20/2012 Document Revised: 05/25/2017 Document Reviewed: 05/21/2015  [employer] Patient Education © 2018 [employer]    Calorie Counting for Weight Loss  Calories are units of energy. Your body needs a certain amount of calories from food to keep you going throughout the day. When you eat more calories than your body needs, your body stores the extra calories as fat. When you eat fewer calories than your body needs, your body burns fat to get the energy it needs.  Calorie counting means keeping track of how many calories you eat and drink each day. Calorie counting can be helpful if you need to lose weight. If you make sure to eat fewer calories than your body needs, you should lose weight. Ask your health care provider what a healthy weight is for you.  For calorie counting to work, you will need to eat the right number of calories in a day in order to lose a healthy amount of weight per week. A dietitian can help you determine how many calories you need in a day and will give you suggestions on how to reach your calorie goal.  · A healthy amount of weight to lose per week is usually 1-2 lb (0.5-0.9 kg). This usually means that your daily calorie intake should be reduced by 500-750 calories.  · Eating 1,200 - 1,500 calories per day can help most women lose weight.  · Eating 1,500 - 1,800 calories per day can help most men lose weight.    What do I need to know about calorie counting?  In order to meet your daily calorie goal, you will need to:  · Find out how  many calories are in each food you would like to eat. Try to do this before you eat.  · Decide how much of the food you plan to eat.  · Write down what you ate and how many calories it had. Doing this is called keeping a food log.    To successfully lose weight, it is important to balance calorie counting with a healthy lifestyle that includes regular activity. Aim for 150 minutes of moderate exercise (such as walking) or 75 minutes of vigorous exercise (such as running) each week.  Where do I find calorie information?    The number of calories in a food can be found on a Nutrition Facts label. If a food does not have a Nutrition Facts label, try to look up the calories online or ask your dietitian for help.  Remember that calories are listed per serving. If you choose to have more than one serving of a food, you will have to multiply the calories per serving by the amount of servings you plan to eat. For example, the label on a package of bread might say that a serving size is 1 slice and that there are 90 calories in a serving. If you eat 1 slice, you will have eaten 90 calories. If you eat 2 slices, you will have eaten 180 calories.  How do I keep a food log?  Immediately after each meal, record the following information in your food log:  · What you ate. Don't forget to include toppings, sauces, and other extras on the food.  · How much you ate. This can be measured in cups, ounces, or number of items.  · How many calories each food and drink had.  · The total number of calories in the meal.    Keep your food log near you, such as in a small notebook in your pocket, or use a mobile te or website. Some programs will calculate calories for you and show you how many calories you have left for the day to meet your goal.  What are some calorie counting tips?  · Use your calories on foods and drinks that will fill you up and not leave you hungry:  ? Some examples of foods that fill you up are nuts and nut butters,  "vegetables, lean proteins, and high-fiber foods like whole grains. High-fiber foods are foods with more than 5 g fiber per serving.  ? Drinks such as sodas, specialty coffee drinks, alcohol, and juices have a lot of calories, yet do not fill you up.  · Eat nutritious foods and avoid empty calories. Empty calories are calories you get from foods or beverages that do not have many vitamins or protein, such as candy, sweets, and soda. It is better to have a nutritious high-calorie food (such as an avocado) than a food with few nutrients (such as a bag of chips).  · Know how many calories are in the foods you eat most often. This will help you calculate calorie counts faster.  · Pay attention to calories in drinks. Low-calorie drinks include water and unsweetened drinks.  · Pay attention to nutrition labels for \"low fat\" or \"fat free\" foods. These foods sometimes have the same amount of calories or more calories than the full fat versions. They also often have added sugar, starch, or salt, to make up for flavor that was removed with the fat.  · Find a way of tracking calories that works for you. Get creative. Try different apps or programs if writing down calories does not work for you.  What are some portion control tips?  · Know how many calories are in a serving. This will help you know how many servings of a certain food you can have.  · Use a measuring cup to measure serving sizes. You could also try weighing out portions on a kitchen scale. With time, you will be able to estimate serving sizes for some foods.  · Take some time to put servings of different foods on your favorite plates, bowls, and cups so you know what a serving looks like.  · Try not to eat straight from a bag or box. Doing this can lead to overeating. Put the amount you would like to eat in a cup or on a plate to make sure you are eating the right portion.  · Use smaller plates, glasses, and bowls to prevent overeating.  · Try not to multitask " (for example, watch TV or use your computer) while eating. If it is time to eat, sit down at a table and enjoy your food. This will help you to know when you are full. It will also help you to be aware of what you are eating and how much you are eating.  What are tips for following this plan?  Reading food labels  · Check the calorie count compared to the serving size. The serving size may be smaller than what you are used to eating.  · Check the source of the calories. Make sure the food you are eating is high in vitamins and protein and low in saturated and trans fats.  Shopping  · Read nutrition labels while you shop. This will help you make healthy decisions before you decide to purchase your food.  · Make a grocery list and stick to it.  Cooking  · Try to cook your favorite foods in a healthier way. For example, try baking instead of frying.  · Use low-fat dairy products.  Meal planning  · Use more fruits and vegetables. Half of your plate should be fruits and vegetables.  · Include lean proteins like poultry and fish.  How do I count calories when eating out?  · Ask for smaller portion sizes.  · Consider sharing an entree and sides instead of getting your own entree.  · If you get your own entree, eat only half. Ask for a box at the beginning of your meal and put the rest of your entree in it so you are not tempted to eat it.  · If calories are listed on the menu, choose the lower calorie options.  · Choose dishes that include vegetables, fruits, whole grains, low-fat dairy products, and lean protein.  · Choose items that are boiled, broiled, grilled, or steamed. Stay away from items that are buttered, battered, fried, or served with cream sauce. Items labeled “crispy” are usually fried, unless stated otherwise.  · Choose water, low-fat milk, unsweetened iced tea, or other drinks without added sugar. If you want an alcoholic beverage, choose a lower calorie option such as a glass of wine or light beer.  · Ask  for dressings, sauces, and syrups on the side. These are usually high in calories, so you should limit the amount you eat.  · If you want a salad, choose a garden salad and ask for grilled meats. Avoid extra toppings like jay, cheese, or fried items. Ask for the dressing on the side, or ask for olive oil and vinegar or lemon to use as dressing.  · Estimate how many servings of a food you are given. For example, a serving of cooked rice is ½ cup or about the size of half a baseball. Knowing serving sizes will help you be aware of how much food you are eating at restaurants. The list below tells you how big or small some common portion sizes are based on everyday objects:  ? 1 oz--4 stacked dice.  ? 3 oz--1 deck of cards.  ? 1 tsp--1 die.  ? 1 Tbsp--½ a ping-pong ball.  ? 2 Tbsp--1 ping-pong ball.  ? ½ cup--½ baseball.  ? 1 cup--1 baseball.  Summary  · Calorie counting means keeping track of how many calories you eat and drink each day. If you eat fewer calories than your body needs, you should lose weight.  · A healthy amount of weight to lose per week is usually 1-2 lb (0.5-0.9 kg). This usually means reducing your daily calorie intake by 500-750 calories.  · The number of calories in a food can be found on a Nutrition Facts label. If a food does not have a Nutrition Facts label, try to look up the calories online or ask your dietitian for help.  · Use your calories on foods and drinks that will fill you up, and not on foods and drinks that will leave you hungry.  · Use smaller plates, glasses, and bowls to prevent overeating.  This information is not intended to replace advice given to you by your health care provider. Make sure you discuss any questions you have with your health care provider.  Document Released: 12/18/2006 Document Revised: 11/17/2017 Document Reviewed: 11/17/2017  "[employer] Interactive Patient Education © 2018 "[employer]    Advance Directive  Advance directives are legal documents that let  you make choices ahead of time about your health care and medical treatment in case you become unable to communicate for yourself. Advance directives are a way for you to communicate your wishes to family, friends, and health care providers. This can help convey your decisions about end-of-life care if you become unable to communicate.  Discussing and writing advance directives should happen over time rather than all at once. Advance directives can be changed depending on your situation and what you want, even after you have signed the advance directives.  If you do not have an advance directive, some states assign family decision makers to act on your behalf based on how closely you are related to them. Each state has its own laws regarding advance directives. You may want to check with your health care provider, , or state representative about the laws in your state. There are different types of advance directives, such as:  · Medical power of .  · Living will.  · Do not resuscitate (DNR) or do not attempt resuscitation (DNAR) order.    Health care proxy and medical power of   A health care proxy, also called a health care agent, is a person who is appointed to make medical decisions for you in cases in which you are unable to make the decisions yourself. Generally, people choose someone they know well and trust to represent their preferences. Make sure to ask this person for an agreement to act as your proxy. A proxy may have to exercise judgment in the event of a medical decision for which your wishes are not known.  A medical power of  is a legal document that names your health care proxy. Depending on the laws in your state, after the document is written, it may also need to be:  · Signed.  · Notarized.  · Dated.  · Copied.  · Witnessed.  · Incorporated into your medical record.    You may also want to appoint someone to manage your financial affairs in a situation in which you  are unable to do so. This is called a durable power of  for finances. It is a separate legal document from the durable power of  for health care. You may choose the same person or someone different from your health care proxy to act as your agent in financial matters.  If you do not appoint a proxy, or if there is a concern that the proxy is not acting in your best interests, a court-appointed guardian may be designated to act on your behalf.  Living will  A living will is a set of instructions documenting your wishes about medical care when you cannot express them yourself. Health care providers should keep a copy of your living will in your medical record. You may want to give a copy to family members or friends. To alert caregivers in case of an emergency, you can place a card in your wallet to let them know that you have a living will and where they can find it. A living will is used if you become:  · Terminally ill.  · Incapacitated.  · Unable to communicate or make decisions.    Items to consider in your living will include:  · The use or non-use of life-sustaining equipment, such as dialysis machines and breathing machines (ventilators).  · A DNR or DNAR order, which is the instruction not to use cardiopulmonary resuscitation (CPR) if breathing or heartbeat stops.  · The use or non-use of tube feeding.  · Withholding of food and fluids.  · Comfort (palliative) care when the goal becomes comfort rather than a cure.  · Organ and tissue donation.    A living will does not give instructions for distributing your money and property if you should pass away. It is recommended that you seek the advice of a  when writing a will. Decisions about taxes, beneficiaries, and asset distribution will be legally binding. This process can relieve your family and friends of any concerns surrounding disputes or questions that may come up about the distribution of your assets.  DNR or DNAR  A DNR or DNAR  order is a request not to have CPR in the event that your heart stops beating or you stop breathing. If a DNR or DNAR order has not been made and shared, a health care provider will try to help any patient whose heart has stopped or who has stopped breathing. If you plan to have surgery, talk with your health care provider about how your DNR or DNAR order will be followed if problems occur.  Summary  · Advance directives are the legal documents that allow you to make choices ahead of time about your health care and medical treatment in case you become unable to communicate for yourself.  · The process of discussing and writing advance directives should happen over time. You can change the advance directives, even after you have signed them.  · Advance directives include DNR or DNAR orders, living chapa, and designating an agent as your medical power of .  This information is not intended to replace advice given to you by your health care provider. Make sure you discuss any questions you have with your health care provider.  Document Released: 03/26/2009 Document Revised: 11/06/2017 Document Reviewed: 11/06/2017  [employer] Interactive Patient Education © 2017 [employer]

## 2018-10-23 NOTE — PROGRESS NOTES
QUICK REFERENCE INFORMATION:  The ABCs of the Annual Wellness Visit    Subsequent Medicare Wellness Visit    HEALTH RISK ASSESSMENT    3/19/1931    Recent Hospitalizations:  No hospitalization(s) within the last year..        Current Medical Providers:  Patient Care Team:  Leoncio Stoll DO as PCP - General  Leoncio Stoll DO as PCP - Claims Attributed        Smoking Status:  History   Smoking Status   • Never Smoker   Smokeless Tobacco   • Never Used       Alcohol Consumption:  History   Alcohol Use No       Depression Screen:   PHQ-2/PHQ-9 Depression Screening 10/23/2018   Little interest or pleasure in doing things 1   Feeling down, depressed, or hopeless 1   Trouble falling or staying asleep, or sleeping too much 0   Feeling tired or having little energy 3   Poor appetite or overeating 0   Feeling bad about yourself - or that you are a failure or have let yourself or your family down 0   Trouble concentrating on things, such as reading the newspaper or watching television 0   Moving or speaking so slowly that other people could have noticed. Or the opposite - being so fidgety or restless that you have been moving around a lot more than usual 0   Thoughts that you would be better off dead, or of hurting yourself in some way 0   Total Score 5       Health Habits and Functional and Cognitive Screening:  Functional & Cognitive Status 10/23/2018   Do you have difficulty preparing food and eating? No   Do you have difficulty bathing yourself, getting dressed or grooming yourself? No   Do you have difficulty using the toilet? No   Do you have difficulty moving around from place to place? No   Do you have trouble with steps or getting out of a bed or a chair? No   In the past year have you fallen or experienced a near fall? No   Current Diet Well Balanced Diet   Dental Exam Up to date   Eye Exam Up to date   Exercise (times per week) 3 times per week   Current Exercise Activities Include Housecleaning   Do you  need help using the phone?  No   Are you deaf or do you have serious difficulty hearing?  Yes   Do you need help with transportation? No   Do you need help shopping? No   Do you need help preparing meals?  No   Do you need help with housework?  No   Do you need help with laundry? No   Do you need help taking your medications? No   Do you need help managing money? No   Do you ever drive or ride in a car without wearing a seat belt? No   Have you felt unusual stress, anger or loneliness in the last month? No   Who do you live with? Spouse   If you need help, do you have trouble finding someone available to you? No   Do you have difficulty concentrating, remembering or making decisions? No           Does the patient have evidence of cognitive impairment? recall 1 of 3;  clock normal    Aspirin use counseling: Taking ASA appropriately as indicated      Recent Lab Results:  CMP:  Lab Results   Component Value Date    GLU 74 06/01/2018    BUN 16 06/01/2018    CREATININE 0.87 06/01/2018    EGFRIFNONA 62 06/01/2018    EGFRIFAFRI 75 06/01/2018    BCR 18.4 06/01/2018     06/01/2018    K 4.3 06/01/2018    CO2 27.3 06/01/2018    CALCIUM 9.4 06/01/2018    PROTENTOTREF 6.9 06/01/2018    ALBUMIN 4.00 06/01/2018    LABGLOBREF 2.9 06/01/2018    LABIL2 1.4 06/01/2018    BILITOT 0.5 06/01/2018    ALKPHOS 64 06/01/2018    AST 13 06/01/2018    ALT 16 06/01/2018     Lipid Panel:  Lab Results   Component Value Date    TRIG 187 (H) 04/24/2018    HDL 37 (L) 04/24/2018    VLDL 37 04/24/2018     HbA1c:       Visual Acuity:  No exam data present    Age-appropriate Screening Schedule:  Refer to the list below for future screening recommendations based on patient's age, sex and/or medical conditions. Orders for these recommended tests are listed in the plan section. The patient has been provided with a written plan.    Health Maintenance   Topic Date Due   • ZOSTER VACCINE (2 of 2) 11/10/2016   • PNEUMOCOCCAL VACCINES (65+ LOW/MEDIUM RISK)  (2 of 2 - PPSV23) 09/15/2017   • MAMMOGRAM  10/21/2018   • LIPID PANEL  04/24/2019   • DXA SCAN  05/15/2020   • TDAP/TD VACCINES (2 - Td) 09/15/2026   • INFLUENZA VACCINE  Completed        Subjective   History of Present Illness    Kelvin Restrepo is a 87 y.o. female who presents for an Subsequent Wellness Visit.  Arterial htn controlled;  BPV recently, now resolved.  Hypothyroidism, on levothyroxine.  Hx of osteoporosis, last dexa 4/2018, refuses medication as read side effects.  Recommend start.      Left shoudler pain off/on.  Also pain down arm;  Using naproxen.    The following portions of the patient's history were reviewed and updated as appropriate: allergies, current medications, past family history, past medical history, past social history, past surgical history and problem list.    Outpatient Medications Prior to Visit   Medication Sig Dispense Refill   • aspirin 325 MG tablet Take 325 mg by mouth Daily.     • B Complex-Folic Acid (B COMPLEX-VITAMIN B12 PO) Take 1 tablet by mouth Daily.     • cetirizine (ZyrTEC) 10 MG tablet Take 10 mg by mouth Daily.     • fexofenadine (ALLEGRA) 180 MG tablet Take 180 mg by mouth Daily.     • levothyroxine (SYNTHROID, LEVOTHROID) 112 MCG tablet Take 1 tablet by mouth Daily. 90 tablet 3   • lisinopril (PRINIVIL,ZESTRIL) 20 MG tablet Take 0.5 tablets by mouth Daily. 90 tablet 3   • montelukast (SINGULAIR) 10 MG tablet Take 10 mg by mouth Daily.     • Multiple Minerals-Vitamins (CALCIUM & VIT D3 BONE HEALTH PO) Take 1 tablet by mouth Daily.     • pantoprazole (PROTONIX) 40 MG EC tablet Take 1 tablet by mouth Daily. 30 tablet 11   • traZODone (DESYREL) 50 MG tablet Take 1 tablet by mouth Every Night. 90 tablet 3   • atorvastatin (LIPITOR) 10 MG tablet Take 1 tablet by mouth Daily. 90 tablet 3   • meloxicam (MOBIC) 7.5 MG tablet Take 1 tablet by mouth Daily. 14 tablet 0     Facility-Administered Medications Prior to Visit   Medication Dose Route Frequency Provider Last Rate  Last Dose   • cyanocobalamin injection 1,000 mcg  1,000 mcg Intramuscular Q28 Days Leoncio Stoll DO   1,000 mcg at 10/10/18 1413       Patient Active Problem List   Diagnosis   • Suprapubic pain   • Allergic state   • Chronic constipation   • Gastroesophageal reflux disease   • Fatigue   • Hyperlipidemia   • Hypothyroidism   • Insomnia   • Obstructive sleep apnea syndrome   • Osteoarthritis of multiple joints   • Osteoporosis   • Cobalamin deficiency   • Essential hypertension   • Anxiety       Advance Care Planning:  has NO advance directive - information provided to the patient today    Identification of Risk Factors:  Risk factors include: weight .    Review of Systems   Respiratory: Negative for shortness of breath.    Cardiovascular: Negative for chest pain.   Gastrointestinal: Negative for abdominal pain, nausea and vomiting.       Compared to one year ago, the patient feels her physical health is the same.  Compared to one year ago, the patient feels her mental health is the same.    Objective     Physical Exam   Constitutional: She appears well-developed and well-nourished.   HENT:   Head: Normocephalic and atraumatic.   Neck: Neck supple. No JVD present. No thyromegaly present.   Cardiovascular: Normal rate, regular rhythm and normal heart sounds.  Exam reveals no gallop and no friction rub.    No murmur heard.  Pulmonary/Chest: Effort normal and breath sounds normal. No respiratory distress. She has no wheezes. She has no rales.   Abdominal: Soft. Bowel sounds are normal. She exhibits no distension. There is no tenderness. There is no rebound and no guarding.   Musculoskeletal: She exhibits no edema.   Neurological: She is alert.   Skin: Skin is warm and dry.   Psychiatric: She has a normal mood and affect. Her behavior is normal.   Nursing note and vitals reviewed.      Vitals:    10/23/18 1308   BP: 128/82   BP Location: Left arm   Patient Position: Sitting   Cuff Size: Adult   Pulse: 89   Temp:  "97.5 °F (36.4 °C)   TempSrc: Oral   SpO2: 98%   Weight: 72.1 kg (159 lb)   Height: 154.9 cm (60.98\")       Patient's Body mass index is 30.06 kg/m². BMI is above normal parameters. Recommendations include: educational material.      Assessment/Plan   Patient Self-Management and Personalized Health Advice  The patient has been provided with information about: diet and preventive services including:   · due for wellness labs.    Visit Diagnoses:    ICD-10-CM ICD-9-CM   1. Medicare annual wellness visit, subsequent Z00.00 V70.0   2. Age-related osteoporosis without current pathological fracture M81.0 733.01   3. Other specified hypothyroidism E03.8 244.8   4. Essential hypertension I10 401.9   5. Other hyperlipidemia E78.49 272.4   6. Vitamin D deficiency E55.9 268.9   7. Irritant contact dermatitis due to metals L24.81 692.83   8. Primary osteoarthritis involving multiple joints M15.0 715.09       Orders Placed This Encounter   Procedures   • Comprehensive Metabolic Panel   • Lipid Panel   • Vitamin D 25 Hydroxy       Outpatient Encounter Prescriptions as of 10/23/2018   Medication Sig Dispense Refill   • aspirin 325 MG tablet Take 325 mg by mouth Daily.     • B Complex-Folic Acid (B COMPLEX-VITAMIN B12 PO) Take 1 tablet by mouth Daily.     • cetirizine (ZyrTEC) 10 MG tablet Take 10 mg by mouth Daily.     • fexofenadine (ALLEGRA) 180 MG tablet Take 180 mg by mouth Daily.     • levothyroxine (SYNTHROID, LEVOTHROID) 112 MCG tablet Take 1 tablet by mouth Daily. 90 tablet 3   • lisinopril (PRINIVIL,ZESTRIL) 20 MG tablet Take 0.5 tablets by mouth Daily. 90 tablet 3   • montelukast (SINGULAIR) 10 MG tablet Take 10 mg by mouth Daily.     • Multiple Minerals-Vitamins (CALCIUM & VIT D3 BONE HEALTH PO) Take 1 tablet by mouth Daily.     • pantoprazole (PROTONIX) 40 MG EC tablet Take 1 tablet by mouth Daily. 30 tablet 11   • traZODone (DESYREL) 50 MG tablet Take 1 tablet by mouth Every Night. 90 tablet 3   • atorvastatin " (LIPITOR) 10 MG tablet Take 1 tablet by mouth Daily. 90 tablet 3   • naproxen (NAPROSYN) 500 MG tablet Take 1 tablet by mouth 2 (Two) Times a Day As Needed for Mild Pain  or Moderate Pain . 180 tablet 1   • triamcinolone (KENALOG) 0.1 % cream Apply  topically to the appropriate area as directed 2 (Two) Times a Day. 30 g 0   • [DISCONTINUED] meloxicam (MOBIC) 7.5 MG tablet Take 1 tablet by mouth Daily. 14 tablet 0   • [DISCONTINUED] triamcinolone (KENALOG) 0.1 % cream Apply  topically to the appropriate area as directed 2 (Two) Times a Day. 30 g 0     Facility-Administered Encounter Medications as of 10/23/2018   Medication Dose Route Frequency Provider Last Rate Last Dose   • cyanocobalamin injection 1,000 mcg  1,000 mcg Intramuscular Q28 Days Leoncio Stoll DO   1,000 mcg at 10/10/18 1413       Reviewed use of high risk medication in the elderly: yes  Reviewed for potential of harmful drug interactions in the elderly: yes    Follow Up:  Return in about 6 months (around 4/23/2019), or if symptoms worsen or fail to improve.     An After Visit Summary and PPPS with all of these plans were given to the patient.      Faxed compound crm 1b for OA pain.

## 2018-10-24 LAB
25(OH)D3+25(OH)D2 SERPL-MCNC: 32.4 NG/ML (ref 30–100)
ALBUMIN SERPL-MCNC: 4 G/DL (ref 3.5–4.7)
ALBUMIN/GLOB SERPL: 1.5 {RATIO} (ref 1.2–2.2)
ALP SERPL-CCNC: 75 IU/L (ref 39–117)
ALT SERPL-CCNC: 21 IU/L (ref 0–32)
AST SERPL-CCNC: 24 IU/L (ref 0–40)
BILIRUB SERPL-MCNC: 0.4 MG/DL (ref 0–1.2)
BUN SERPL-MCNC: 12 MG/DL (ref 8–27)
BUN/CREAT SERPL: 15 (ref 12–28)
CALCIUM SERPL-MCNC: 9.2 MG/DL (ref 8.7–10.3)
CHLORIDE SERPL-SCNC: 102 MMOL/L (ref 96–106)
CHOLEST SERPL-MCNC: 136 MG/DL (ref 100–199)
CO2 SERPL-SCNC: 24 MMOL/L (ref 20–29)
CREAT SERPL-MCNC: 0.78 MG/DL (ref 0.57–1)
GLOBULIN SER CALC-MCNC: 2.7 G/DL (ref 1.5–4.5)
GLUCOSE SERPL-MCNC: 77 MG/DL (ref 65–99)
HDLC SERPL-MCNC: 30 MG/DL
LDLC SERPL CALC-MCNC: 77 MG/DL (ref 0–99)
POTASSIUM SERPL-SCNC: 4.4 MMOL/L (ref 3.5–5.2)
PROT SERPL-MCNC: 6.7 G/DL (ref 6–8.5)
SODIUM SERPL-SCNC: 142 MMOL/L (ref 134–144)
TRIGL SERPL-MCNC: 146 MG/DL (ref 0–149)
VLDLC SERPL CALC-MCNC: 29 MG/DL (ref 5–40)

## 2018-11-14 ENCOUNTER — TELEPHONE (OUTPATIENT)
Dept: FAMILY MEDICINE CLINIC | Facility: CLINIC | Age: 83
End: 2018-11-14

## 2018-11-14 RX ORDER — LISINOPRIL 10 MG/1
10 TABLET ORAL DAILY
Qty: 90 TABLET | Refills: 3 | Status: SHIPPED | OUTPATIENT
Start: 2018-11-14 | End: 2019-11-25 | Stop reason: SDUPTHER

## 2018-12-05 ENCOUNTER — CLINICAL SUPPORT (OUTPATIENT)
Dept: FAMILY MEDICINE CLINIC | Facility: CLINIC | Age: 83
End: 2018-12-05

## 2018-12-05 DIAGNOSIS — E53.8 COBALAMIN DEFICIENCY: ICD-10-CM

## 2018-12-05 PROCEDURE — 96372 THER/PROPH/DIAG INJ SC/IM: CPT | Performed by: FAMILY MEDICINE

## 2018-12-05 RX ADMIN — CYANOCOBALAMIN 1000 MCG: 1000 INJECTION, SOLUTION INTRAMUSCULAR; SUBCUTANEOUS at 11:04

## 2019-02-01 ENCOUNTER — CLINICAL SUPPORT (OUTPATIENT)
Dept: FAMILY MEDICINE CLINIC | Facility: CLINIC | Age: 84
End: 2019-02-01

## 2019-02-01 DIAGNOSIS — E53.8 COBALAMIN DEFICIENCY: Primary | ICD-10-CM

## 2019-02-01 PROCEDURE — 96372 THER/PROPH/DIAG INJ SC/IM: CPT | Performed by: FAMILY MEDICINE

## 2019-02-01 RX ADMIN — CYANOCOBALAMIN 1000 MCG: 1000 INJECTION, SOLUTION INTRAMUSCULAR; SUBCUTANEOUS at 12:59

## 2019-02-06 ENCOUNTER — TELEPHONE (OUTPATIENT)
Dept: FAMILY MEDICINE CLINIC | Facility: CLINIC | Age: 84
End: 2019-02-06

## 2019-02-06 RX ORDER — ATORVASTATIN CALCIUM 10 MG/1
10 TABLET, FILM COATED ORAL DAILY
Qty: 90 TABLET | Refills: 3 | Status: SHIPPED | OUTPATIENT
Start: 2019-02-06 | End: 2020-02-17 | Stop reason: SDUPTHER

## 2019-03-07 ENCOUNTER — CLINICAL SUPPORT (OUTPATIENT)
Dept: FAMILY MEDICINE CLINIC | Facility: CLINIC | Age: 84
End: 2019-03-07

## 2019-03-07 DIAGNOSIS — E53.8 COBALAMIN DEFICIENCY: ICD-10-CM

## 2019-03-07 PROCEDURE — 96372 THER/PROPH/DIAG INJ SC/IM: CPT | Performed by: FAMILY MEDICINE

## 2019-03-07 RX ADMIN — CYANOCOBALAMIN 1000 MCG: 1000 INJECTION, SOLUTION INTRAMUSCULAR; SUBCUTANEOUS at 14:49

## 2019-04-03 ENCOUNTER — CLINICAL SUPPORT (OUTPATIENT)
Dept: FAMILY MEDICINE CLINIC | Facility: CLINIC | Age: 84
End: 2019-04-03

## 2019-04-03 DIAGNOSIS — E53.8 COBALAMIN DEFICIENCY: ICD-10-CM

## 2019-04-03 PROCEDURE — 96372 THER/PROPH/DIAG INJ SC/IM: CPT | Performed by: FAMILY MEDICINE

## 2019-04-03 RX ADMIN — CYANOCOBALAMIN 1000 MCG: 1000 INJECTION, SOLUTION INTRAMUSCULAR; SUBCUTANEOUS at 15:52

## 2019-04-12 ENCOUNTER — TELEPHONE (OUTPATIENT)
Dept: FAMILY MEDICINE CLINIC | Facility: CLINIC | Age: 84
End: 2019-04-12

## 2019-04-12 DIAGNOSIS — E03.8 OTHER SPECIFIED HYPOTHYROIDISM: ICD-10-CM

## 2019-04-12 RX ORDER — LEVOTHYROXINE SODIUM 112 UG/1
112 TABLET ORAL DAILY
Qty: 90 TABLET | Refills: 3 | Status: SHIPPED | OUTPATIENT
Start: 2019-04-12 | End: 2020-05-07 | Stop reason: SDUPTHER

## 2019-04-23 ENCOUNTER — OFFICE VISIT (OUTPATIENT)
Dept: FAMILY MEDICINE CLINIC | Facility: CLINIC | Age: 84
End: 2019-04-23

## 2019-04-23 VITALS
OXYGEN SATURATION: 97 % | WEIGHT: 156 LBS | SYSTOLIC BLOOD PRESSURE: 126 MMHG | HEART RATE: 88 BPM | TEMPERATURE: 97.6 F | DIASTOLIC BLOOD PRESSURE: 70 MMHG | BODY MASS INDEX: 29.49 KG/M2

## 2019-04-23 DIAGNOSIS — E03.8 OTHER SPECIFIED HYPOTHYROIDISM: ICD-10-CM

## 2019-04-23 DIAGNOSIS — I10 ESSENTIAL HYPERTENSION: Primary | ICD-10-CM

## 2019-04-23 DIAGNOSIS — M81.0 AGE-RELATED OSTEOPOROSIS WITHOUT CURRENT PATHOLOGICAL FRACTURE: ICD-10-CM

## 2019-04-23 DIAGNOSIS — E78.49 OTHER HYPERLIPIDEMIA: ICD-10-CM

## 2019-04-23 DIAGNOSIS — M17.12 PRIMARY OSTEOARTHRITIS OF LEFT KNEE: ICD-10-CM

## 2019-04-23 PROCEDURE — 20610 DRAIN/INJ JOINT/BURSA W/O US: CPT | Performed by: FAMILY MEDICINE

## 2019-04-23 PROCEDURE — 99214 OFFICE O/P EST MOD 30 MIN: CPT | Performed by: FAMILY MEDICINE

## 2019-04-23 RX ORDER — METHYLPREDNISOLONE ACETATE 80 MG/ML
80 INJECTION, SUSPENSION INTRA-ARTICULAR; INTRALESIONAL; INTRAMUSCULAR; SOFT TISSUE ONCE
Status: COMPLETED | OUTPATIENT
Start: 2019-04-23 | End: 2019-04-23

## 2019-04-23 RX ORDER — LIDOCAINE HYDROCHLORIDE 20 MG/ML
2 INJECTION, SOLUTION INFILTRATION; PERINEURAL ONCE
Status: COMPLETED | OUTPATIENT
Start: 2019-04-23 | End: 2019-04-23

## 2019-04-23 RX ADMIN — METHYLPREDNISOLONE ACETATE 80 MG: 80 INJECTION, SUSPENSION INTRA-ARTICULAR; INTRALESIONAL; INTRAMUSCULAR; SOFT TISSUE at 16:52

## 2019-04-23 RX ADMIN — LIDOCAINE HYDROCHLORIDE 2 ML: 20 INJECTION, SOLUTION INFILTRATION; PERINEURAL at 16:51

## 2019-04-23 NOTE — PROGRESS NOTES
Subjective   Kelvin Restrepo is a 88 y.o. female. Presents today for   Chief Complaint   Patient presents with   • Knee Pain     bilateral knee pain    • Hypertension   • Hyperlipidemia   • Hypothyroidism       Knee Pain    The incident occurred more than 1 week ago. The incident occurred at home. There was no injury mechanism. The pain is present in the left knee and right knee. The pain is moderate. The pain has been fluctuating since onset. Pertinent negatives include no numbness or tingling. She has tried non-weight bearing for the symptoms. The treatment provided mild relief.   Hypertension   This is a chronic problem. The current episode started more than 1 year ago. The problem is unchanged. The problem is controlled. Pertinent negatives include no chest pain, orthopnea, palpitations, peripheral edema, PND or shortness of breath. There are no associated agents to hypertension. Risk factors for coronary artery disease include dyslipidemia and post-menopausal state. Past treatments include ACE inhibitors. Current antihypertension treatment includes ACE inhibitors. The current treatment provides moderate improvement. There are no compliance problems.  There is no history of kidney disease, CAD/MI, CVA or heart failure. There is no history of chronic renal disease.   Hyperlipidemia   This is a chronic problem. The current episode started more than 1 year ago. The problem is controlled. Recent lipid tests were reviewed and are normal. Exacerbating diseases include hypothyroidism. She has no history of chronic renal disease. Pertinent negatives include no chest pain or shortness of breath. Current antihyperlipidemic treatment includes statins. The current treatment provides moderate improvement of lipids. There are no compliance problems.  Risk factors for coronary artery disease include hypertension and dyslipidemia.   Hypothyroidism   This is a chronic problem. The current episode started more than 1 year ago.  The problem occurs constantly. The problem has been unchanged. Pertinent negatives include no abdominal pain, change in bowel habit (has chronic constipation), chest pain or numbness. Treatments tried: on levothyroxine.       Review of Systems   Respiratory: Negative for shortness of breath.    Cardiovascular: Negative for chest pain, palpitations, orthopnea and PND.   Gastrointestinal: Negative for abdominal pain and change in bowel habit (has chronic constipation).   Neurological: Negative for tingling and numbness.       Patient Active Problem List   Diagnosis   • Suprapubic pain   • Allergic state   • Chronic constipation   • Gastroesophageal reflux disease   • Fatigue   • Hyperlipidemia   • Hypothyroidism   • Insomnia   • Obstructive sleep apnea syndrome   • Osteoarthritis of multiple joints   • Osteoporosis   • Cobalamin deficiency   • Essential hypertension   • Anxiety       Social History     Socioeconomic History   • Marital status:      Spouse name: Not on file   • Number of children: Not on file   • Years of education: Not on file   • Highest education level: Not on file   Tobacco Use   • Smoking status: Never Smoker   • Smokeless tobacco: Never Used   Substance and Sexual Activity   • Alcohol use: No   • Drug use: No       Allergies   Allergen Reactions   • Amlodipine      Patient does not recall ever having a reaction to this medication    • Prednisone Hives       Current Outpatient Medications on File Prior to Visit   Medication Sig Dispense Refill   • aspirin 325 MG tablet Take 325 mg by mouth Daily.     • atorvastatin (LIPITOR) 10 MG tablet Take 1 tablet by mouth Daily. 90 tablet 3   • B Complex-Folic Acid (B COMPLEX-VITAMIN B12 PO) Take 1 tablet by mouth Daily.     • cetirizine (ZyrTEC) 10 MG tablet Take 10 mg by mouth Daily.     • fexofenadine (ALLEGRA) 180 MG tablet Take 180 mg by mouth Daily.     • levothyroxine (SYNTHROID, LEVOTHROID) 112 MCG tablet Take 1 tablet by mouth Daily. 90 tablet  3   • lisinopril (PRINIVIL,ZESTRIL) 10 MG tablet Take 1 tablet by mouth Daily. 90 tablet 3   • montelukast (SINGULAIR) 10 MG tablet Take 10 mg by mouth Daily.     • Multiple Minerals-Vitamins (CALCIUM & VIT D3 BONE HEALTH PO) Take 1 tablet by mouth Daily.     • naproxen (NAPROSYN) 500 MG tablet Take 1 tablet by mouth 2 (Two) Times a Day As Needed for Mild Pain  or Moderate Pain . 180 tablet 1   • pantoprazole (PROTONIX) 40 MG EC tablet Take 1 tablet by mouth Daily. 30 tablet 11   • traZODone (DESYREL) 50 MG tablet Take 1 tablet by mouth Every Night. 90 tablet 3   • triamcinolone (KENALOG) 0.1 % cream Apply  topically to the appropriate area as directed 2 (Two) Times a Day. 30 g 0     Current Facility-Administered Medications on File Prior to Visit   Medication Dose Route Frequency Provider Last Rate Last Dose   • cyanocobalamin injection 1,000 mcg  1,000 mcg Intramuscular Q28 Days Leoncio Stoll DO   1,000 mcg at 04/03/19 1552       Objective   Vitals:    04/23/19 1310   BP: 126/70   Pulse: 88   Temp: 97.6 °F (36.4 °C)   SpO2: 97%   Weight: 70.8 kg (156 lb)       Physical Exam   Constitutional: She appears well-developed and well-nourished.   HENT:   Head: Normocephalic and atraumatic.   Neck: Neck supple. No JVD present. No thyromegaly present.   Cardiovascular: Normal rate, regular rhythm and normal heart sounds. Exam reveals no gallop and no friction rub.   No murmur heard.  Pulmonary/Chest: Effort normal and breath sounds normal. No respiratory distress. She has no wheezes. She has no rales.   Abdominal: Soft. Bowel sounds are normal. She exhibits no distension. There is no tenderness. There is no rebound and no guarding.   Musculoskeletal: She exhibits no edema (severe varicose veins).        Left knee: She exhibits effusion. Tenderness found.   Neurological: She is alert.   Skin: Skin is warm and dry.   Psychiatric: She has a normal mood and affect. Her behavior is normal.   Nursing note and vitals  reviewed.      Arthrocentesis  Date/Time: 4/23/2019 1:39 PM  Performed by: Leoncio Stoll DO  Authorized by: Leoncio Stoll DO   Consent: Verbal consent obtained.  Risks and benefits: risks, benefits and alternatives were discussed  Consent given by: patient  Patient understanding: patient states understanding of the procedure being performed  Site marked: the operative site was marked  Required items: required blood products, implants, devices, and special equipment available  Patient identity confirmed: verbally with patient  Indications: pain   Body area: knee  Joint: left knee  Local anesthesia used: yes    Anesthesia:  Local anesthesia used: yes  Local Anesthetic: topical anesthetic    Sedation:  Patient sedated: no    Preparation: Patient was prepped and draped in the usual sterile fashion.  Needle size: 22 G  Approach: lateral  Patient tolerance: Patient tolerated the procedure well with no immediate complications            Assessment/Plan   Littie was seen today for knee pain, hypertension, hyperlipidemia and hypothyroidism.    Diagnoses and all orders for this visit:    Essential hypertension  -     Comprehensive Metabolic Panel    Other hyperlipidemia  -     Comprehensive Metabolic Panel  -     Lipid Panel    Other specified hypothyroidism  -     TSH    Age-related osteoporosis without current pathological fracture  -     Vitamin D 25 Hydroxy    Primary osteoarthritis of left knee  -     methylPREDNISolone acetate (DEPO-medrol) injection 80 mg  -     lidocaine (XYLOCAINE) 2% injection 2 mL  -     Arthrocentesis    Post-joint injection instructions given, warned may be sore and achy next 2-3 days, recommend ice as directed.  -hypertension - controlled, continue medications  -HLD - continue statin, recheck lipids.  -hypothyroidism - continue medication, recheck thyroid labs.  -due recheck vitamin D level today    Patient has had a mastectomy, needs new breast prosthesis and bras for balance and  symmetry.  Hers are past their useful lifespan.         -Follow up: 6 months and prn

## 2019-04-24 LAB
25(OH)D3+25(OH)D2 SERPL-MCNC: 30.1 NG/ML (ref 30–100)
ALBUMIN SERPL-MCNC: 4.6 G/DL (ref 3.5–4.7)
ALBUMIN/GLOB SERPL: 1.6 {RATIO} (ref 1.2–2.2)
ALP SERPL-CCNC: 75 IU/L (ref 39–117)
ALT SERPL-CCNC: 24 IU/L (ref 0–32)
AST SERPL-CCNC: 27 IU/L (ref 0–40)
BILIRUB SERPL-MCNC: 0.4 MG/DL (ref 0–1.2)
BUN SERPL-MCNC: 15 MG/DL (ref 8–27)
BUN/CREAT SERPL: 16 (ref 12–28)
CALCIUM SERPL-MCNC: 9.5 MG/DL (ref 8.7–10.3)
CHLORIDE SERPL-SCNC: 97 MMOL/L (ref 96–106)
CHOLEST SERPL-MCNC: 160 MG/DL (ref 100–199)
CO2 SERPL-SCNC: 20 MMOL/L (ref 20–29)
CREAT SERPL-MCNC: 0.96 MG/DL (ref 0.57–1)
GLOBULIN SER CALC-MCNC: 2.8 G/DL (ref 1.5–4.5)
GLUCOSE SERPL-MCNC: 84 MG/DL (ref 65–99)
HDLC SERPL-MCNC: 38 MG/DL
LDLC SERPL CALC-MCNC: 94 MG/DL (ref 0–99)
POTASSIUM SERPL-SCNC: 4.1 MMOL/L (ref 3.5–5.2)
PROT SERPL-MCNC: 7.4 G/DL (ref 6–8.5)
SODIUM SERPL-SCNC: 138 MMOL/L (ref 134–144)
TRIGL SERPL-MCNC: 140 MG/DL (ref 0–149)
TSH SERPL DL<=0.005 MIU/L-ACNC: 0.82 UIU/ML (ref 0.45–4.5)
VLDLC SERPL CALC-MCNC: 28 MG/DL (ref 5–40)

## 2019-05-02 ENCOUNTER — TELEPHONE (OUTPATIENT)
Dept: FAMILY MEDICINE CLINIC | Facility: CLINIC | Age: 84
End: 2019-05-02

## 2019-06-03 ENCOUNTER — CLINICAL SUPPORT (OUTPATIENT)
Dept: FAMILY MEDICINE CLINIC | Facility: CLINIC | Age: 84
End: 2019-06-03

## 2019-06-03 DIAGNOSIS — E53.8 COBALAMIN DEFICIENCY: ICD-10-CM

## 2019-06-03 PROCEDURE — 96372 THER/PROPH/DIAG INJ SC/IM: CPT | Performed by: FAMILY MEDICINE

## 2019-06-03 RX ADMIN — CYANOCOBALAMIN 1000 MCG: 1000 INJECTION, SOLUTION INTRAMUSCULAR; SUBCUTANEOUS at 14:52

## 2019-08-01 ENCOUNTER — CLINICAL SUPPORT (OUTPATIENT)
Dept: FAMILY MEDICINE CLINIC | Facility: CLINIC | Age: 84
End: 2019-08-01

## 2019-08-01 DIAGNOSIS — E53.8 COBALAMIN DEFICIENCY: ICD-10-CM

## 2019-08-01 PROCEDURE — 96372 THER/PROPH/DIAG INJ SC/IM: CPT | Performed by: FAMILY MEDICINE

## 2019-08-01 RX ADMIN — CYANOCOBALAMIN 1000 MCG: 1000 INJECTION, SOLUTION INTRAMUSCULAR; SUBCUTANEOUS at 15:12

## 2019-10-08 ENCOUNTER — OFFICE VISIT (OUTPATIENT)
Dept: FAMILY MEDICINE CLINIC | Facility: CLINIC | Age: 84
End: 2019-10-08

## 2019-10-08 VITALS
SYSTOLIC BLOOD PRESSURE: 122 MMHG | HEART RATE: 91 BPM | WEIGHT: 158 LBS | BODY MASS INDEX: 29.83 KG/M2 | HEIGHT: 61 IN | TEMPERATURE: 97.9 F | OXYGEN SATURATION: 98 % | DIASTOLIC BLOOD PRESSURE: 74 MMHG

## 2019-10-08 DIAGNOSIS — Z23 IMMUNIZATION DUE: ICD-10-CM

## 2019-10-08 DIAGNOSIS — M15.9 PRIMARY OSTEOARTHRITIS INVOLVING MULTIPLE JOINTS: ICD-10-CM

## 2019-10-08 DIAGNOSIS — I10 ESSENTIAL HYPERTENSION: Primary | ICD-10-CM
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
## 2019-10-08 PROCEDURE — 96372 THER/PROPH/DIAG INJ SC/IM: CPT | Performed by: NURSE PRACTITIONER

## 2019-10-08 PROCEDURE — G0008 ADMIN INFLUENZA VIRUS VAC: HCPCS | Performed by: NURSE PRACTITIONER

## 2019-10-08 PROCEDURE — 90653 IIV ADJUVANT VACCINE IM: CPT | Performed by: NURSE PRACTITIONER

## 2019-10-08 PROCEDURE — 99214 OFFICE O/P EST MOD 30 MIN: CPT | Performed by: NURSE PRACTITIONER

## 2019-10-08 RX ADMIN — CYANOCOBALAMIN 1000 MCG: 1000 INJECTION, SOLUTION INTRAMUSCULAR; SUBCUTANEOUS at 14:16

## 2019-10-08 NOTE — PROGRESS NOTES
Subjective   Kelvin Restrepo is a 88 y.o. female who presents for follow up after a fall that happened on 10/4. Went to [facility] ER and had imaging. Also due for 6 month FU.     History of Present Illness   Fall secondary to accidentally stepped on the cat in the darkened [place]. Still pretty sore in hips and buttocks.doesn't remember Oscar picking her up. Had a lump and sore place on head. Has more soreness than pain, hips and buttocks and head. No headache. Legs feel a little weak, not numb. R shoulder was sore, but improving. Takes naproxen for 2-3 weeks, then increases stomach pain, then backs off. Then will restart.  Used to have ativan, Dr. Stoll won't refill as would cause alzheimer's. She reports helped back more than anything. [facility] and [facility] are still filling prescriptions. She would like to have some on hand for nervous episodes. Doesn't like zanaflex for back. Has tried once or twice, but doesn't like, unsure why.   The following portions of the patient's history were reviewed and updated as appropriate: allergies, current medications, past family history, past medical history, past social history, past surgical history and problem list.    Review of Systems   Constitutional: Negative for activity change, appetite change, chills, fatigue, fever, unexpected weight gain and unexpected weight loss.   HENT: Negative.    Eyes: Negative.    Respiratory: Negative.    Cardiovascular: Negative.    Gastrointestinal: Negative.    Endocrine: Negative for cold intolerance, heat intolerance, polydipsia, polyphagia and polyuria.   Musculoskeletal: Positive for arthralgias, back pain and myalgias. Negative for gait problem, joint swelling and bursitis.   Skin: Positive for color change and bruise.   Neurological: Positive for dizziness (short lived when lays positionally, no new since the fall. ) and weakness. Negative for headache.   Hematological: Bruises/bleeds easily.   Psychiatric/Behavioral:  "Negative for sleep disturbance.     /74   Pulse 91   Temp 97.9 °F (36.6 °C) (Oral)   Ht 154.9 cm (61\")   Wt 71.7 kg (158 lb)   SpO2 98%   BMI 29.85 kg/m²     Objective   Physical Exam   Constitutional: She appears well-developed and well-nourished.   Neck: Normal range of motion. Neck supple. No thyromegaly present.   Cardiovascular: Normal rate, regular rhythm and normal heart sounds.   Pulmonary/Chest: Effort normal and breath sounds normal.   Musculoskeletal:        Right hip: Normal.        Left hip: Normal.        Lumbar back: She exhibits bony tenderness (very low across sacrum) and pain. She exhibits no swelling.   Lymphadenopathy:     She has no cervical adenopathy.   Skin: Skin is warm and dry.   Psychiatric: She has a normal mood and affect. Her behavior is normal. Judgment and thought content normal.   Nursing note and vitals reviewed.    Assessment/Plan   Problems Addressed this Visit        Cardiovascular and Mediastinum    Essential hypertension - Primary    Relevant Orders    Comprehensive Metabolic Panel       Musculoskeletal and Integument    Osteoarthritis of multiple joints      Other Visit Diagnoses     Fall, sequela        Immunization due        Relevant Orders    Fluad Tri 65yr+ (Completed)        HTN--not on medications--check labs  OA diffuse--Reviewed xrays, has 2 old compression fractures L45. CT pelvis with no fractures, discussed with pt, not most tender area. Has not been taking zanaflex. Encouraged to retry, and find out what she doesn't like. Ok to trial 1/2s. Ok for tylenol to assist with rest. Would continue to hold BZD due to increased risks in the elderly. Discussed at length strong recommendation against.  Fall pains--should continue to settle--alternate ice and heat.   Update flu vaccine  Ok for B12  FU 6 months.       "

## 2019-10-09 LAB
ALBUMIN SERPL-MCNC: 4.5 G/DL (ref 3.5–4.7)
ALBUMIN/GLOB SERPL: 1.7 {RATIO} (ref 1.2–2.2)
ALP SERPL-CCNC: 71 IU/L (ref 39–117)
ALT SERPL-CCNC: 19 IU/L (ref 0–32)
AST SERPL-CCNC: 21 IU/L (ref 0–40)
BILIRUB SERPL-MCNC: 0.4 MG/DL (ref 0–1.2)
BUN SERPL-MCNC: 15 MG/DL (ref 8–27)
BUN/CREAT SERPL: 19 (ref 12–28)
CALCIUM SERPL-MCNC: 9.5 MG/DL (ref 8.7–10.3)
CHLORIDE SERPL-SCNC: 101 MMOL/L (ref 96–106)
CO2 SERPL-SCNC: 25 MMOL/L (ref 20–29)
CREAT SERPL-MCNC: 0.8 MG/DL (ref 0.57–1)
GLOBULIN SER CALC-MCNC: 2.7 G/DL (ref 1.5–4.5)
GLUCOSE SERPL-MCNC: 123 MG/DL (ref 65–99)
POTASSIUM SERPL-SCNC: 4.6 MMOL/L (ref 3.5–5.2)
PROT SERPL-MCNC: 7.2 G/DL (ref 6–8.5)
SODIUM SERPL-SCNC: 142 MMOL/L (ref 134–144)

## 2019-11-25 ENCOUNTER — TELEPHONE (OUTPATIENT)
Dept: FAMILY MEDICINE CLINIC | Facility: CLINIC | Age: 84
End: 2019-11-25

## 2019-11-25 RX ORDER — LISINOPRIL 10 MG/1
10 TABLET ORAL DAILY
Qty: 90 TABLET | Refills: 1 | Status: SHIPPED | OUTPATIENT
Start: 2019-11-25 | End: 2020-05-07 | Stop reason: SDUPTHER

## 2019-11-26 ENCOUNTER — CLINICAL SUPPORT (OUTPATIENT)
Dept: FAMILY MEDICINE CLINIC | Facility: CLINIC | Age: 84
End: 2019-11-26

## 2019-11-26 DIAGNOSIS — E53.8 COBALAMIN DEFICIENCY: ICD-10-CM

## 2019-11-26 PROCEDURE — 96372 THER/PROPH/DIAG INJ SC/IM: CPT | Performed by: FAMILY MEDICINE

## 2019-11-26 RX ADMIN — CYANOCOBALAMIN 1000 MCG: 1000 INJECTION, SOLUTION INTRAMUSCULAR; SUBCUTANEOUS at 14:56

## 2020-01-02 ENCOUNTER — CLINICAL SUPPORT (OUTPATIENT)
Dept: FAMILY MEDICINE CLINIC | Facility: CLINIC | Age: 85
End: 2020-01-02

## 2020-01-02 DIAGNOSIS — K59.09 CHRONIC CONSTIPATION: ICD-10-CM

## 2020-01-02 PROCEDURE — 96372 THER/PROPH/DIAG INJ SC/IM: CPT | Performed by: FAMILY MEDICINE

## 2020-01-02 RX ADMIN — CYANOCOBALAMIN 1000 MCG: 1000 INJECTION, SOLUTION INTRAMUSCULAR; SUBCUTANEOUS at 15:41

## 2020-01-31 ENCOUNTER — OFFICE VISIT (OUTPATIENT)
Dept: FAMILY MEDICINE CLINIC | Facility: CLINIC | Age: 85
End: 2020-01-31

## 2020-01-31 VITALS
TEMPERATURE: 98.2 F | HEART RATE: 80 BPM | HEIGHT: 61 IN | DIASTOLIC BLOOD PRESSURE: 72 MMHG | BODY MASS INDEX: 29.27 KG/M2 | OXYGEN SATURATION: 96 % | WEIGHT: 155 LBS | SYSTOLIC BLOOD PRESSURE: 120 MMHG

## 2020-01-31 DIAGNOSIS — S29.9XXA RIB INJURY: ICD-10-CM

## 2020-01-31 DIAGNOSIS — M81.0 AGE-RELATED OSTEOPOROSIS WITHOUT CURRENT PATHOLOGICAL FRACTURE: Primary | ICD-10-CM

## 2020-01-31 PROCEDURE — 99213 OFFICE O/P EST LOW 20 MIN: CPT | Performed by: NURSE PRACTITIONER

## 2020-01-31 PROCEDURE — 71101 X-RAY EXAM UNILAT RIBS/CHEST: CPT | Performed by: NURSE PRACTITIONER

## 2020-02-05 NOTE — PROGRESS NOTES
Please call the patient regarding her abnormal result.  Radiology did not think fracture, FU if pain not settling

## 2020-02-17 ENCOUNTER — TELEPHONE (OUTPATIENT)
Dept: FAMILY MEDICINE CLINIC | Facility: CLINIC | Age: 85
End: 2020-02-17

## 2020-02-17 RX ORDER — ATORVASTATIN CALCIUM 10 MG/1
10 TABLET, FILM COATED ORAL DAILY
Qty: 90 TABLET | Refills: 1 | Status: SHIPPED | OUTPATIENT
Start: 2020-02-17 | End: 2020-08-03 | Stop reason: SDUPTHER

## 2020-03-05 ENCOUNTER — CLINICAL SUPPORT (OUTPATIENT)
Dept: FAMILY MEDICINE CLINIC | Facility: CLINIC | Age: 85
End: 2020-03-05

## 2020-03-05 DIAGNOSIS — E53.8 COBALAMIN DEFICIENCY: ICD-10-CM

## 2020-03-05 PROCEDURE — 96372 THER/PROPH/DIAG INJ SC/IM: CPT | Performed by: FAMILY MEDICINE

## 2020-03-05 RX ADMIN — CYANOCOBALAMIN 1000 MCG: 1000 INJECTION, SOLUTION INTRAMUSCULAR; SUBCUTANEOUS at 15:55

## 2020-05-07 DIAGNOSIS — E03.8 OTHER SPECIFIED HYPOTHYROIDISM: ICD-10-CM

## 2020-05-07 RX ORDER — LISINOPRIL 10 MG/1
10 TABLET ORAL DAILY
Qty: 90 TABLET | Refills: 1 | Status: SHIPPED | OUTPATIENT
Start: 2020-05-07 | End: 2020-10-30 | Stop reason: SDUPTHER

## 2020-05-07 RX ORDER — LISINOPRIL 10 MG/1
10 TABLET ORAL DAILY
Qty: 90 TABLET | Refills: 1 | Status: SHIPPED | OUTPATIENT
Start: 2020-05-07 | End: 2020-05-07 | Stop reason: SDUPTHER

## 2020-05-07 RX ORDER — ASPIRIN 325 MG
325 TABLET ORAL DAILY
Qty: 90 TABLET | Refills: 1 | Status: SHIPPED | OUTPATIENT
Start: 2020-05-07 | End: 2020-10-30 | Stop reason: SDUPTHER

## 2020-05-07 RX ORDER — LEVOTHYROXINE SODIUM 112 UG/1
112 TABLET ORAL DAILY
Qty: 90 TABLET | Refills: 1 | Status: SHIPPED | OUTPATIENT
Start: 2020-05-07 | End: 2020-10-30 | Stop reason: SDUPTHER

## 2020-05-07 NOTE — TELEPHONE ENCOUNTER
Requesting re-fill for:    aspirin 325 MG tablet  levothyroxine (SYNTHROID, LEVOTHROID) 112 MCG tablet  lisinopril (PRINIVIL,ZESTRIL) 10 MG tablet        [facility], confirmed    Patient call back [phone]

## 2020-08-03 ENCOUNTER — TELEPHONE (OUTPATIENT)
Dept: FAMILY MEDICINE CLINIC | Facility: CLINIC | Age: 85
End: 2020-08-03

## 2020-08-03 RX ORDER — ATORVASTATIN CALCIUM 10 MG/1
10 TABLET, FILM COATED ORAL DAILY
Qty: 90 TABLET | Refills: 1 | Status: SHIPPED | OUTPATIENT
Start: 2020-08-03 | End: 2021-02-22 | Stop reason: SDUPTHER

## 2020-08-10 ENCOUNTER — TELEPHONE (OUTPATIENT)
Dept: FAMILY MEDICINE CLINIC | Facility: CLINIC | Age: 85
End: 2020-08-10

## 2020-08-10 RX ORDER — LANOLIN ALCOHOL/MO/W.PET/CERES
1000 CREAM (GRAM) TOPICAL DAILY
Qty: 30 TABLET | Refills: 5 | Status: SHIPPED | OUTPATIENT
Start: 2020-08-10 | End: 2021-02-22 | Stop reason: SDUPTHER

## 2020-10-30 DIAGNOSIS — E03.8 OTHER SPECIFIED HYPOTHYROIDISM: ICD-10-CM

## 2020-10-30 RX ORDER — LISINOPRIL 10 MG/1
10 TABLET ORAL DAILY
Qty: 30 TABLET | Refills: 0 | Status: SHIPPED | OUTPATIENT
Start: 2020-10-30 | End: 2020-11-02 | Stop reason: SDUPTHER

## 2020-10-30 RX ORDER — ASPIRIN 325 MG
325 TABLET ORAL DAILY
Qty: 30 TABLET | Refills: 0 | Status: SHIPPED | OUTPATIENT
Start: 2020-10-30 | End: 2020-11-02 | Stop reason: SDUPTHER

## 2020-10-30 RX ORDER — LEVOTHYROXINE SODIUM 112 UG/1
112 TABLET ORAL DAILY
Qty: 30 TABLET | Refills: 0 | Status: SHIPPED | OUTPATIENT
Start: 2020-10-30 | End: 2020-11-02 | Stop reason: SDUPTHER

## 2020-10-30 NOTE — TELEPHONE ENCOUNTER
Caller: Kelvin Restrepo    Relationship: Self    Best call back number: [phone]    Medication needed:   Requested Prescriptions     Pending Prescriptions Disp Refills   • levothyroxine (SYNTHROID, LEVOTHROID) 112 MCG tablet 90 tablet 1     Sig: Take 1 tablet by mouth Daily.   • lisinopril (PRINIVIL,ZESTRIL) 10 MG tablet 90 tablet 1     Sig: Take 1 tablet by mouth Daily.   • aspirin 325 MG tablet 90 tablet 1     Sig: Take 1 tablet by mouth Daily.       When do you need the refill by: PT HAS A FEW DAYS    What details did the patient provide when requesting the medication:     Does the patient have less than a 3 day supply:  [] Yes  [x] No    What is the patient's preferred pharmacy: [facility] [place] - [address] [phone] [facility] [phone]

## 2020-11-02 ENCOUNTER — FLU SHOT (OUTPATIENT)
Dept: FAMILY MEDICINE CLINIC | Facility: CLINIC | Age: 85
End: 2020-11-02

## 2020-11-02 DIAGNOSIS — E03.8 OTHER SPECIFIED HYPOTHYROIDISM: ICD-10-CM

## 2020-11-02 DIAGNOSIS — Z23 NEED FOR INFLUENZA VACCINATION: ICD-10-CM

## 2020-11-02 PROCEDURE — 90694 VACC AIIV4 NO PRSRV 0.5ML IM: CPT | Performed by: FAMILY MEDICINE

## 2020-11-02 PROCEDURE — G0008 ADMIN INFLUENZA VIRUS VAC: HCPCS | Performed by: FAMILY MEDICINE

## 2020-11-02 RX ORDER — LISINOPRIL 10 MG/1
10 TABLET ORAL DAILY
Qty: 15 TABLET | Refills: 0 | Status: SHIPPED | OUTPATIENT
Start: 2020-11-02 | End: 2020-12-07 | Stop reason: SDUPTHER

## 2020-11-02 RX ORDER — LEVOTHYROXINE SODIUM 112 UG/1
112 TABLET ORAL DAILY
Qty: 15 TABLET | Refills: 0 | Status: SHIPPED | OUTPATIENT
Start: 2020-11-02 | End: 2020-12-07 | Stop reason: SDUPTHER

## 2020-11-02 RX ORDER — ASPIRIN 325 MG
325 TABLET ORAL DAILY
Qty: 15 TABLET | Refills: 0 | Status: SHIPPED | OUTPATIENT
Start: 2020-11-02

## 2020-11-02 NOTE — TELEPHONE ENCOUNTER
Caller: ASHWINI SANCHEZ     Relationship: SELF    Best call back number: [phone]*    Medication needed:   Requested Prescriptions     Pending Prescriptions Disp Refills   • levothyroxine (SYNTHROID, LEVOTHROID) 112 MCG tablet 30 tablet 0     Sig: Take 1 tablet by mouth Daily. PLEASE CALL THE OFFICE FOR AN APPT   • lisinopril (PRINIVIL,ZESTRIL) 10 MG tablet 30 tablet 0     Sig: Take 1 tablet by mouth Daily. PLEASE CALL THE OFFICE FOR AN APPT   • aspirin 325 MG tablet 30 tablet 0     Sig: Take 1 tablet by mouth Daily. PLEASE CALL THE OFFICE FOR AN APPT       When do you need the refill by: 11/05/20    What details did the patient provide when requesting the medication: PATIENT WANTS TO  MEDICATIONS AT THE [facility], [place] ON 11/5.    Does the patient have less than a 3 day supply:  [] Yes  [x] No    What is the patient's preferred pharmacy: [facility] [phone] [facility] [phone]

## 2020-11-09 ENCOUNTER — TELEPHONE (OUTPATIENT)
Dept: FAMILY MEDICINE CLINIC | Facility: CLINIC | Age: 85
End: 2020-11-09

## 2020-11-09 DIAGNOSIS — M81.0 AGE-RELATED OSTEOPOROSIS WITHOUT CURRENT PATHOLOGICAL FRACTURE: ICD-10-CM

## 2020-11-09 DIAGNOSIS — E03.8 OTHER SPECIFIED HYPOTHYROIDISM: Primary | ICD-10-CM

## 2020-11-09 DIAGNOSIS — I10 ESSENTIAL HYPERTENSION: ICD-10-CM

## 2020-11-09 DIAGNOSIS — E53.8 COBALAMIN DEFICIENCY: ICD-10-CM

## 2020-11-09 DIAGNOSIS — E78.49 OTHER HYPERLIPIDEMIA: ICD-10-CM

## 2020-11-11 LAB
25(OH)D3+25(OH)D2 SERPL-MCNC: 23.2 NG/ML (ref 30–100)
ALBUMIN SERPL-MCNC: 4.3 G/DL (ref 3.5–5.2)
ALBUMIN/GLOB SERPL: 1.6 G/DL
ALP SERPL-CCNC: 81 U/L (ref 39–117)
ALT SERPL-CCNC: 21 U/L (ref 1–33)
AST SERPL-CCNC: 22 U/L (ref 1–32)
BASOPHILS # BLD AUTO: 0 10*3/MM3 (ref 0–0.2)
BASOPHILS NFR BLD AUTO: 0 % (ref 0–1.5)
BILIRUB SERPL-MCNC: 0.7 MG/DL (ref 0–1.2)
BUN SERPL-MCNC: 15 MG/DL (ref 8–23)
BUN/CREAT SERPL: 16.9 (ref 7–25)
CALCIUM SERPL-MCNC: 9.5 MG/DL (ref 8.6–10.5)
CHLORIDE SERPL-SCNC: 98 MMOL/L (ref 98–107)
CHOLEST SERPL-MCNC: 169 MG/DL (ref 0–200)
CO2 SERPL-SCNC: 29.2 MMOL/L (ref 22–29)
CREAT SERPL-MCNC: 0.89 MG/DL (ref 0.57–1)
EOSINOPHIL # BLD AUTO: 0.01 10*3/MM3 (ref 0–0.4)
EOSINOPHIL NFR BLD AUTO: 0.2 % (ref 0.3–6.2)
ERYTHROCYTE [DISTWIDTH] IN BLOOD BY AUTOMATED COUNT: 12.5 % (ref 12.3–15.4)
GLOBULIN SER CALC-MCNC: 2.7 GM/DL
GLUCOSE SERPL-MCNC: 107 MG/DL (ref 65–99)
HCT VFR BLD AUTO: 44.1 % (ref 34–46.6)
HDLC SERPL-MCNC: 37 MG/DL (ref 40–60)
HGB BLD-MCNC: 14.2 G/DL (ref 12–15.9)
IMM GRANULOCYTES # BLD AUTO: 0.03 10*3/MM3 (ref 0–0.05)
IMM GRANULOCYTES NFR BLD AUTO: 0.5 % (ref 0–0.5)
LDLC SERPL CALC-MCNC: 95 MG/DL (ref 0–100)
LYMPHOCYTES # BLD AUTO: 1.33 10*3/MM3 (ref 0.7–3.1)
LYMPHOCYTES NFR BLD AUTO: 20 % (ref 19.6–45.3)
MCH RBC QN AUTO: 28.2 PG (ref 26.6–33)
MCHC RBC AUTO-ENTMCNC: 32.2 G/DL (ref 31.5–35.7)
MCV RBC AUTO: 87.7 FL (ref 79–97)
MONOCYTES # BLD AUTO: 0.68 10*3/MM3 (ref 0.1–0.9)
MONOCYTES NFR BLD AUTO: 10.2 % (ref 5–12)
NEUTROPHILS # BLD AUTO: 4.61 10*3/MM3 (ref 1.7–7)
NEUTROPHILS NFR BLD AUTO: 69.1 % (ref 42.7–76)
NRBC BLD AUTO-RTO: 0 /100 WBC (ref 0–0.2)
PLATELET # BLD AUTO: 247 10*3/MM3 (ref 140–450)
POTASSIUM SERPL-SCNC: 4.2 MMOL/L (ref 3.5–5.2)
PROT SERPL-MCNC: 7 G/DL (ref 6–8.5)
RBC # BLD AUTO: 5.03 10*6/MM3 (ref 3.77–5.28)
SODIUM SERPL-SCNC: 137 MMOL/L (ref 136–145)
TRIGL SERPL-MCNC: 215 MG/DL (ref 0–150)
TSH SERPL DL<=0.005 MIU/L-ACNC: 1.78 UIU/ML (ref 0.27–4.2)
VIT B12 SERPL-MCNC: 698 PG/ML (ref 211–946)
VLDLC SERPL CALC-MCNC: 37 MG/DL (ref 5–40)
WBC # BLD AUTO: 6.66 10*3/MM3 (ref 3.4–10.8)

## 2020-11-12 ENCOUNTER — OFFICE VISIT (OUTPATIENT)
Dept: FAMILY MEDICINE CLINIC | Facility: CLINIC | Age: 85
End: 2020-11-12

## 2020-11-12 VITALS
TEMPERATURE: 97.4 F | HEART RATE: 84 BPM | BODY MASS INDEX: 28.16 KG/M2 | OXYGEN SATURATION: 97 % | DIASTOLIC BLOOD PRESSURE: 76 MMHG | SYSTOLIC BLOOD PRESSURE: 124 MMHG | HEIGHT: 62 IN | WEIGHT: 153 LBS

## 2020-11-12 DIAGNOSIS — E03.8 OTHER SPECIFIED HYPOTHYROIDISM: ICD-10-CM

## 2020-11-12 DIAGNOSIS — E78.49 OTHER HYPERLIPIDEMIA: ICD-10-CM

## 2020-11-12 DIAGNOSIS — M81.0 AGE-RELATED OSTEOPOROSIS WITHOUT CURRENT PATHOLOGICAL FRACTURE: Primary | ICD-10-CM

## 2020-11-12 DIAGNOSIS — K57.92 DIVERTICULITIS: ICD-10-CM

## 2020-11-12 DIAGNOSIS — I10 ESSENTIAL HYPERTENSION: ICD-10-CM

## 2020-11-12 PROCEDURE — 99214 OFFICE O/P EST MOD 30 MIN: CPT | Performed by: NURSE PRACTITIONER

## 2020-11-12 PROCEDURE — G0439 PPPS, SUBSEQ VISIT: HCPCS | Performed by: NURSE PRACTITIONER

## 2020-11-12 RX ORDER — CIPROFLOXACIN 500 MG/1
500 TABLET, FILM COATED ORAL 2 TIMES DAILY
Qty: 14 TABLET | Refills: 0 | Status: SHIPPED | OUTPATIENT
Start: 2020-11-12 | End: 2021-04-13

## 2020-11-12 RX ORDER — MULTIPLE VITAMINS W/ MINERALS TAB 9MG-400MCG
1 TAB ORAL DAILY
COMMUNITY

## 2020-11-12 NOTE — PROGRESS NOTES
The ABCs of the Annual Wellness Visit  Subsequent Medicare Wellness Visit    Chief Complaint   Patient presents with   • Follow-up       Subjective   History of Present Illness:  Kelvin Restrepo is a 89 y.o. female who presents for a Subsequent Medicare Wellness Visit.    HEALTH RISK ASSESSMENT    Recent Hospitalizations:  No hospitalization(s) within the last year.    Current Medical Providers:  Patient Care Team:  Leoncio Stoll DO as PCP - General    Smoking Status:  Social History     Tobacco Use   Smoking Status Never Smoker   Smokeless Tobacco Never Used       Alcohol Consumption:  Social History     Substance and Sexual Activity   Alcohol Use No       Depression Screen:   PHQ-2/PHQ-9 Depression Screening 11/12/2020   Little interest or pleasure in doing things 0   Feeling down, depressed, or hopeless 1   Trouble falling or staying asleep, or sleeping too much 0   Feeling tired or having little energy 0   Poor appetite or overeating 0   Feeling bad about yourself - or that you are a failure or have let yourself or your family down 0   Trouble concentrating on things, such as reading the newspaper or watching television 0   Moving or speaking so slowly that other people could have noticed. Or the opposite - being so fidgety or restless that you have been moving around a lot more than usual 0   Thoughts that you would be better off dead, or of hurting yourself in some way 0   Total Score 1   If you checked off any problems, how difficult have these problems made it for you to do your work, take care of things at home, or get along with other people? Not difficult at all       Fall Risk Screen:  STEADI Fall Risk Assessment was completed, and patient is at MODERATE risk for falls. Assessment completed on:11/12/2020    Health Habits and Functional and Cognitive Screening:  Functional & Cognitive Status 11/12/2020   Do you have difficulty preparing food and eating? No   Do you have difficulty bathing yourself,  getting dressed or grooming yourself? No   Do you have difficulty using the toilet? No   Do you have difficulty moving around from place to place? No   Do you have trouble with steps or getting out of a bed or a chair? Yes   Current Diet Well Balanced Diet   Dental Exam Up to date   Eye Exam Up to date   Exercise (times per week) 3 times per week   Current Exercises Include House Cleaning   Current Exercise Activities Include -   Do you need help using the phone?  No   Are you deaf or do you have serious difficulty hearing?  No   Do you need help with transportation? Yes   Do you need help shopping? No   Do you need help preparing meals?  No   Do you need help with housework?  No   Do you need help with laundry? No   Do you need help taking your medications? No   Do you need help managing money? No   Do you ever drive or ride in a car without wearing a seat belt? Yes   Have you felt unusual stress, anger or loneliness in the last month? No   Who do you live with? Other   If you need help, do you have trouble finding someone available to you? No   Have you been bothered in the last four weeks by sexual problems? No   Do you have difficulty concentrating, remembering or making decisions? Yes         Does the patient have evidence of cognitive impairment? No    Asprin use counseling:Taking ASA but at inappropriate dose (instructed to take 325 mg ECASA daily)    Age-appropriate Screening Schedule:  Refer to the list below for future screening recommendations based on patient's age, sex and/or medical conditions. Orders for these recommended tests are listed in the plan section. The patient has been provided with a written plan.    Health Maintenance   Topic Date Due   • DXA SCAN  05/15/2020   • MAMMOGRAM  10/08/2021   • LIPID PANEL  11/10/2021   • TDAP/TD VACCINES (2 - Td) 09/15/2026   • INFLUENZA VACCINE  Completed          The following portions of the patient's history were reviewed and updated as appropriate:  allergies, current medications, past family history, past medical history, past social history, past surgical history and problem list.    Outpatient Medications Prior to Visit   Medication Sig Dispense Refill   • aspirin 325 MG tablet Take 1 tablet by mouth Daily. PLEASE CALL THE OFFICE FOR AN APPT 15 tablet 0   • atorvastatin (LIPITOR) 10 MG tablet Take 1 tablet by mouth Daily. 90 tablet 1   • cetirizine (ZyrTEC) 10 MG tablet Take 10 mg by mouth Daily.     • levothyroxine (SYNTHROID, LEVOTHROID) 112 MCG tablet Take 1 tablet by mouth Daily. PLEASE CALL THE OFFICE FOR AN APPT 15 tablet 0   • lisinopril (PRINIVIL,ZESTRIL) 10 MG tablet Take 1 tablet by mouth Daily. PLEASE CALL THE OFFICE FOR AN APPT 15 tablet 0   • montelukast (SINGULAIR) 10 MG tablet Take 10 mg by mouth Daily.     • Multiple Minerals-Vitamins (CALCIUM & VIT D3 BONE HEALTH PO) Take 1 tablet by mouth Daily.     • multivitamin with minerals (PRESERVISION AREDS PO) Take 1 tablet by mouth Daily.     • vitamin B-12 (CYANOCOBALAMIN) 1000 MCG tablet Take 1 tablet by mouth Daily. 30 tablet 5   • B Complex-Folic Acid (B COMPLEX-VITAMIN B12 PO) Take 1 tablet by mouth Daily.     • fexofenadine (ALLEGRA) 180 MG tablet Take 180 mg by mouth Daily.       No facility-administered medications prior to visit.        Patient Active Problem List   Diagnosis   • Suprapubic pain   • Allergic state   • Chronic constipation   • Gastroesophageal reflux disease   • Fatigue   • Hyperlipidemia   • Hypothyroidism   • Insomnia   • Obstructive sleep apnea syndrome   • Osteoarthritis of multiple joints   • Osteoporosis   • Cobalamin deficiency   • Essential hypertension   • Anxiety       Advanced Care Planning:  ACP discussion was held with the patient during this visit. Patient has an advance directive (not in EMR), copy requested.    Review of Systems    Compared to one year ago, the patient feels her physical health is worse.  Compared to one year ago, the patient feels her  "mental health is the same.    Reviewed chart for potential of high risk medication in the elderly: yes  Reviewed chart for potential of harmful drug interactions in the elderly:yes    Objective         Vitals:    11/12/20 1150   BP: 124/76   Pulse: 84   Temp: 97.4 °F (36.3 °C)   TempSrc: Infrared   SpO2: 97%   Weight: 69.4 kg (153 lb)   Height: 156.2 cm (61.5\")   PainSc:   2   PainLoc: Hip       Body mass index is 28.44 kg/m².  Discussed the patient's BMI with her. The BMI is in the acceptable range.    Physical Exam    Lab Results   Component Value Date     (H) 11/10/2020    CHLPL 169 11/10/2020    TRIG 215 (H) 11/10/2020    HDL 37 (L) 11/10/2020    LDL 95 11/10/2020    VLDL 37 11/10/2020        Assessment/Plan   Medicare Risks and Personalized Health Plan  CMS Preventative Services Quick Reference  Advance Directive Discussion  Fall Risk  Immunizations Discussed/Encouraged (specific immunizations; adacel Tdap )    The above risks/problems have been discussed with the patient.  Pertinent information has been shared with the patient in the After Visit Summary.  Follow up plans and orders are seen below in the Assessment/Plan Section.    There are no diagnoses linked to this encounter.  Follow Up:  No follow-ups on file.     An After Visit Summary and PPPS were given to the patient.             "

## 2020-11-12 NOTE — PROGRESS NOTES
"Subjective   Kelvin Restrepo is a 89 y.o. female who presents for a follow up on hyperlipidemia, hypertension.    History of Present Illness   No problem with statin or BP medication. Takes ASA for pain prn and daily. Has never considered lower dose  Had gas pain yesterday to LLQ and improved with gas x and ice and ok. Has had progressively in last week. Takes an herbal laxative typically every other day. Stools start hard and difficult to pass then become soft. Does feel empties completely.  The following portions of the patient's history were reviewed and updated as appropriate: allergies, current medications, past family history, past medical history, past social history, past surgical history and problem list.    Review of Systems   Constitutional: Negative for activity change, appetite change, fatigue, unexpected weight gain and unexpected weight loss.   Respiratory: Negative.  Negative for shortness of breath.    Cardiovascular: Negative.  Negative for chest pain, palpitations and leg swelling.   Gastrointestinal: Positive for abdominal pain and constipation.   Musculoskeletal: Positive for arthralgias.   Neurological: Positive for dizziness.   Psychiatric/Behavioral: Negative.      /76   Pulse 84   Temp 97.4 °F (36.3 °C) (Infrared)   Ht 156.2 cm (61.5\")   Wt 69.4 kg (153 lb)   SpO2 97%   BMI 28.44 kg/m²     Objective   Physical Exam  Vitals signs and nursing note reviewed.   Constitutional:       Appearance: She is well-developed. She is not diaphoretic.   HENT:      Head: Normocephalic and atraumatic.   Neck:      Musculoskeletal: Normal range of motion and neck supple.      Thyroid: No thyromegaly.   Cardiovascular:      Rate and Rhythm: Normal rate and regular rhythm.      Pulses:           Carotid pulses are 2+ on the right side and 2+ on the left side.     Heart sounds: Normal heart sounds.   Pulmonary:      Effort: Pulmonary effort is normal.      Breath sounds: Normal breath sounds. "   Abdominal:      General: Abdomen is flat. Bowel sounds are normal.      Palpations: Abdomen is soft.      Tenderness: There is abdominal tenderness in the left lower quadrant. There is no rebound. Negative signs include Rovsing's sign, psoas sign and obturator sign.      Hernia: No hernia is present.   Lymphadenopathy:      Cervical: No cervical adenopathy.   Psychiatric:         Behavior: Behavior normal.         Thought Content: Thought content normal.         Judgment: Judgment normal.       Assessment/Plan   Problems Addressed this Visit        Cardiovascular and Mediastinum    Hyperlipidemia    Essential hypertension       Endocrine    Hypothyroidism       Musculoskeletal and Integument    Osteoporosis - Primary      Other Visit Diagnoses     Diverticulitis        Relevant Medications    ciprofloxacin (Cipro) 500 MG tablet      Diagnoses       Codes Comments    Age-related osteoporosis without current pathological fracture    -  Primary ICD-10-CM: M81.0  ICD-9-CM: 733.01     Other specified hypothyroidism     ICD-10-CM: E03.8  ICD-9-CM: 244.8     Other hyperlipidemia     ICD-10-CM: E78.49  ICD-9-CM: 272.4     Essential hypertension     ICD-10-CM: I10  ICD-9-CM: 401.9     Diverticulitis     ICD-10-CM: K57.92  ICD-9-CM: 562.11         Osteoporosis--continue calcium and vit D. Falls precautions  Hypothyroidism--continue replacement, update levels  HLD--on statin, update  HTN--well controlled--continue medications  Diverticulitis--markedly tender on exam. Start antibiotics and FU if not improving 1 week

## 2020-12-07 DIAGNOSIS — E03.8 OTHER SPECIFIED HYPOTHYROIDISM: ICD-10-CM

## 2020-12-07 RX ORDER — LEVOTHYROXINE SODIUM 112 UG/1
112 TABLET ORAL DAILY
Qty: 30 TABLET | Refills: 5 | Status: SHIPPED | OUTPATIENT
Start: 2020-12-07 | End: 2021-06-15 | Stop reason: SDUPTHER

## 2020-12-07 RX ORDER — LISINOPRIL 10 MG/1
10 TABLET ORAL DAILY
Qty: 30 TABLET | Refills: 5 | Status: SHIPPED | OUTPATIENT
Start: 2020-12-07 | End: 2021-05-25 | Stop reason: SDUPTHER

## 2020-12-07 NOTE — TELEPHONE ENCOUNTER
Caller: Kelvin Restrepo    Relationship: Self    Best call back number: [phone]     Medication needed:   Requested Prescriptions     Pending Prescriptions Disp Refills   • levothyroxine (SYNTHROID, LEVOTHROID) 112 MCG tablet 15 tablet 0     Sig: Take 1 tablet by mouth Daily. PLEASE CALL THE OFFICE FOR AN APPT   • lisinopril (PRINIVIL,ZESTRIL) 10 MG tablet 15 tablet 0     Sig: Take 1 tablet by mouth Daily. PLEASE CALL THE OFFICE FOR AN APPT       When do you need the refill by: 12/14/20    What details did the patient provide when requesting the medication: levothyroxine (SYNTHROID, LEVOTHROID) 112 MCG tablet IS NO LONGER COVERED BY PHARMACY, NEEDS AN ALTERNATIVE 90 DAY REQUEST    Does the patient have less than a 3 day supply:  [] Yes  [x] No    What is the patient's preferred pharmacy: [facility] [place], [place]  [address] [phone] [facility] [phone]

## 2021-02-22 RX ORDER — ATORVASTATIN CALCIUM 10 MG/1
10 TABLET, FILM COATED ORAL DAILY
Qty: 90 TABLET | Refills: 1 | Status: SHIPPED | OUTPATIENT
Start: 2021-02-22 | End: 2021-05-25 | Stop reason: SDUPTHER

## 2021-02-22 RX ORDER — LANOLIN ALCOHOL/MO/W.PET/CERES
1000 CREAM (GRAM) TOPICAL DAILY
Qty: 30 TABLET | Refills: 5 | Status: SHIPPED | OUTPATIENT
Start: 2021-02-22 | End: 2021-10-11 | Stop reason: SDUPTHER

## 2021-02-22 NOTE — TELEPHONE ENCOUNTER
Caller: Kelvin Restrepo    Relationship: Self    Best call back number:[phone]    Medication needed:   Requested Prescriptions     Pending Prescriptions Disp Refills   • vitamin B-12 (CYANOCOBALAMIN) 1000 MCG tablet 30 tablet 5     Sig: Take 1 tablet by mouth Daily.   • atorvastatin (LIPITOR) 10 MG tablet 90 tablet 1     Sig: Take 1 tablet by mouth Daily.       When do you need the refill by: 02/22    What details did the patient provide when requesting the medication: PATIENT HAS  FEW DAYS LEFT AND GETS A 90 DAY SUPPLY OF EACH    Does the patient have less than a 3 day supply:  [x] Yes  [] No    What is the patient's preferred pharmacy: [facility] [place]  [address] [phone] [facility] [phone]

## 2021-04-13 ENCOUNTER — OFFICE VISIT (OUTPATIENT)
Dept: FAMILY MEDICINE CLINIC | Facility: CLINIC | Age: 86
End: 2021-04-13

## 2021-04-13 VITALS
WEIGHT: 153 LBS | HEIGHT: 62 IN | DIASTOLIC BLOOD PRESSURE: 72 MMHG | OXYGEN SATURATION: 99 % | TEMPERATURE: 97.5 F | HEART RATE: 77 BPM | BODY MASS INDEX: 28.16 KG/M2 | SYSTOLIC BLOOD PRESSURE: 122 MMHG

## 2021-04-13 DIAGNOSIS — M81.0 AGE-RELATED OSTEOPOROSIS WITHOUT CURRENT PATHOLOGICAL FRACTURE: ICD-10-CM

## 2021-04-13 DIAGNOSIS — K21.9 GASTROESOPHAGEAL REFLUX DISEASE, UNSPECIFIED WHETHER ESOPHAGITIS PRESENT: ICD-10-CM

## 2021-04-13 DIAGNOSIS — F41.9 ANXIETY: Primary | ICD-10-CM

## 2021-04-13 DIAGNOSIS — R53.82 CHRONIC FATIGUE: ICD-10-CM

## 2021-04-13 PROCEDURE — 99214 OFFICE O/P EST MOD 30 MIN: CPT | Performed by: NURSE PRACTITIONER

## 2021-04-13 RX ORDER — ESOMEPRAZOLE MAGNESIUM 40 MG/1
40 CAPSULE, DELAYED RELEASE ORAL
Qty: 30 CAPSULE | Refills: 3 | Status: SHIPPED | OUTPATIENT
Start: 2021-04-13 | End: 2022-02-21 | Stop reason: SDUPTHER

## 2021-04-13 NOTE — PROGRESS NOTES
"Subjective   Kelvin RAMONA Restrepo is a 89 y.o. female who presents c/o increase in reflux, fatigue.     History of Present Illness   Has not been vaccinated for COVID. Having some regurgitation with acid reflux. Also feeling fatigued like when B12 low. In the last 2 weeks has limited portions and eaten a bland diet and symptoms of regurgitation and brash improved. Previously took nexium, but [facility] won't cover anymore  Does have diagnosis of osteoporosis  Does get dizzyness when lays and turns head to R  Lives with long term significant other, Oscar  The following portions of the patient's history were reviewed and updated as appropriate: allergies, current medications, past family history, past medical history, past social history, past surgical history and problem list.    Review of Systems   Constitutional: Positive for appetite change and fatigue. Negative for chills and unexpected weight change.   HENT: Positive for congestion. Negative for hearing loss.    Respiratory: Negative.    Cardiovascular: Negative.    Gastrointestinal: Positive for nausea and vomiting. Negative for constipation (constipation improved with probiotic) and diarrhea.   Endocrine: Negative.    Musculoskeletal: Positive for arthralgias. Negative for gait problem.   Skin: Negative.    Neurological: Positive for dizziness. Negative for speech difficulty, weakness, light-headedness and numbness.   Hematological: Negative.    Psychiatric/Behavioral: Negative.      /72   Pulse 77   Temp 97.5 °F (36.4 °C) (Infrared)   Ht 156.2 cm (61.5\")   Wt 69.4 kg (153 lb)   SpO2 99%   BMI 28.44 kg/m²     Objective   Physical Exam  Vitals and nursing note reviewed.   Constitutional:       Appearance: She is well-developed. She is not diaphoretic.   HENT:      Head: Normocephalic and atraumatic.   Neck:      Thyroid: No thyromegaly.   Cardiovascular:      Rate and Rhythm: Normal rate and regular rhythm.      Pulses:           Carotid pulses are 2+ on the " right side and 2+ on the left side.     Heart sounds: Normal heart sounds.   Pulmonary:      Effort: Pulmonary effort is normal.      Breath sounds: Normal breath sounds.   Abdominal:      Tenderness: There is no abdominal tenderness.   Musculoskeletal:      Cervical back: Normal range of motion and neck supple.   Lymphadenopathy:      Cervical: No cervical adenopathy.   Neurological:      General: No focal deficit present.      Coordination: Coordination normal.      Gait: Gait normal.   Psychiatric:         Behavior: Behavior normal.         Thought Content: Thought content normal.         Judgment: Judgment normal.       Assessment/Plan   Problems Addressed this Visit        Gastrointestinal Abdominal     Gastroesophageal reflux disease    Relevant Medications    esomeprazole (nexIUM) 40 MG capsule    Other Relevant Orders    CBC (No Diff)    Comprehensive Metabolic Panel       Mental Health    Anxiety - Primary    Relevant Orders    TSH       Musculoskeletal and Injuries    Osteoporosis    Relevant Orders    Vitamin D 25 Hydroxy    DEXA Bone Density Axial       Symptoms and Signs    Fatigue    Relevant Orders    Vitamin B12    TSH      Diagnoses       Codes Comments    Anxiety    -  Primary ICD-10-CM: F41.9  ICD-9-CM: 300.00     Chronic fatigue     ICD-10-CM: R53.82  ICD-9-CM: 780.79     Gastroesophageal reflux disease, unspecified whether esophagitis present     ICD-10-CM: K21.9  ICD-9-CM: 530.81     Age-related osteoporosis without current pathological fracture     ICD-10-CM: M81.0  ICD-9-CM: 733.01         Anxiety--check TSH--does not want medication  Chronic fatigue--check labs. nexium can interfere with B12 absorption--used to get injections and liked. Since pandemic have not been able to provide this service. Is taking orals.  GERD--Restart nexium--plans to get at [facility] as [facility] no longer paying. Consider attention to bone density secondary to therapy  Osteoporosis--check vit D and dexa    Has had US  carotids many years ago

## 2021-04-14 LAB
25(OH)D3+25(OH)D2 SERPL-MCNC: 27 NG/ML (ref 30–100)
ALBUMIN SERPL-MCNC: 4.2 G/DL (ref 3.5–5.2)
ALBUMIN/GLOB SERPL: 1.7 G/DL
ALP SERPL-CCNC: 72 U/L (ref 39–117)
ALT SERPL-CCNC: 20 U/L (ref 1–33)
AST SERPL-CCNC: 26 U/L (ref 1–32)
BILIRUB SERPL-MCNC: 0.4 MG/DL (ref 0–1.2)
BUN SERPL-MCNC: 14 MG/DL (ref 8–23)
BUN/CREAT SERPL: 18.7 (ref 7–25)
CALCIUM SERPL-MCNC: 9.6 MG/DL (ref 8.2–9.6)
CHLORIDE SERPL-SCNC: 101 MMOL/L (ref 98–107)
CO2 SERPL-SCNC: 29.2 MMOL/L (ref 22–29)
CREAT SERPL-MCNC: 0.75 MG/DL (ref 0.57–1)
ERYTHROCYTE [DISTWIDTH] IN BLOOD BY AUTOMATED COUNT: 12.7 % (ref 12.3–15.4)
GLOBULIN SER CALC-MCNC: 2.5 GM/DL
GLUCOSE SERPL-MCNC: 76 MG/DL (ref 65–99)
HCT VFR BLD AUTO: 42 % (ref 34–46.6)
HGB BLD-MCNC: 13.6 G/DL (ref 12–15.9)
MCH RBC QN AUTO: 28.8 PG (ref 26.6–33)
MCHC RBC AUTO-ENTMCNC: 32.4 G/DL (ref 31.5–35.7)
MCV RBC AUTO: 89 FL (ref 79–97)
PLATELET # BLD AUTO: 222 10*3/MM3 (ref 140–450)
POTASSIUM SERPL-SCNC: 4.3 MMOL/L (ref 3.5–5.2)
PROT SERPL-MCNC: 6.7 G/DL (ref 6–8.5)
RBC # BLD AUTO: 4.72 10*6/MM3 (ref 3.77–5.28)
SODIUM SERPL-SCNC: 139 MMOL/L (ref 136–145)
TSH SERPL DL<=0.005 MIU/L-ACNC: 0.34 UIU/ML (ref 0.27–4.2)
VIT B12 SERPL-MCNC: 813 PG/ML (ref 211–946)
WBC # BLD AUTO: 6.13 10*3/MM3 (ref 3.4–10.8)

## 2021-04-14 NOTE — PROGRESS NOTES
Please call the patient regarding her abnormal result. B12 and counts ok. Vit d low, increase intake by 1000 IU daily

## 2021-05-25 RX ORDER — ATORVASTATIN CALCIUM 10 MG/1
10 TABLET, FILM COATED ORAL DAILY
Qty: 90 TABLET | Refills: 1 | Status: SHIPPED | OUTPATIENT
Start: 2021-05-25 | End: 2021-12-09 | Stop reason: SDUPTHER

## 2021-05-25 RX ORDER — LISINOPRIL 10 MG/1
10 TABLET ORAL DAILY
Qty: 30 TABLET | Refills: 5 | Status: SHIPPED | OUTPATIENT
Start: 2021-05-25 | End: 2021-12-09 | Stop reason: SDUPTHER

## 2021-05-25 NOTE — TELEPHONE ENCOUNTER
Caller: Kelvin Restrepo    Relationship: Self    Best call back number: [phone]     Medication needed:    lisinopril (PRINIVIL,ZESTRIL) 10 MG tablet     levothyroxine (SYNTHROID, LEVOTHROID) 112 MCG tablet     When do you need the refill by: 05/25/21    Does the patient have less than a 3 day supply:  [] Yes  [x] No    What is the patient's preferred pharmacy:    [facility] [place] [facility] [address]  [phone] [facility] [phone]   [phone]      PLEASE ADVISE.

## 2021-06-15 DIAGNOSIS — E03.8 OTHER SPECIFIED HYPOTHYROIDISM: ICD-10-CM

## 2021-06-15 RX ORDER — LEVOTHYROXINE SODIUM 112 UG/1
112 TABLET ORAL DAILY
Qty: 90 TABLET | Refills: 1 | Status: SHIPPED | OUTPATIENT
Start: 2021-06-15 | End: 2021-12-09 | Stop reason: SDUPTHER

## 2021-06-15 NOTE — TELEPHONE ENCOUNTER
Caller: Kelvin Restrepo    Relationship: Self    Best call back number: [phone] (H)  Medication needed:   Requested Prescriptions     Pending Prescriptions Disp Refills   • levothyroxine (SYNTHROID, LEVOTHROID) 112 MCG tablet 30 tablet 5     Sig: Take 1 tablet by mouth Daily.       When do you need the refill by: AS SOON AS POSSIBLE    What additional details did the patient provide when requesting the medication: PATIENT STATES SHE HAS 4 PILLS LEFT AND IT IS SUPPOSED TO BE A 90 DAY REFILL.     Does the patient have less than a 3 day supply:  [] Yes  [x] No    What is the patient's preferred pharmacy:    [facility]  [place] [place] - [address] - [phone] [facility] [phone]   [phone]

## 2021-08-06 ENCOUNTER — OFFICE VISIT (OUTPATIENT)
Dept: FAMILY MEDICINE CLINIC | Facility: CLINIC | Age: 86
End: 2021-08-06

## 2021-08-06 VITALS
WEIGHT: 155 LBS | SYSTOLIC BLOOD PRESSURE: 132 MMHG | HEIGHT: 61 IN | BODY MASS INDEX: 29.27 KG/M2 | DIASTOLIC BLOOD PRESSURE: 88 MMHG | HEART RATE: 86 BPM | OXYGEN SATURATION: 97 %

## 2021-08-06 DIAGNOSIS — G89.29 CHRONIC LLQ PAIN: ICD-10-CM

## 2021-08-06 DIAGNOSIS — G89.29 CHRONIC LUQ PAIN: Primary | ICD-10-CM

## 2021-08-06 DIAGNOSIS — R10.32 CHRONIC LLQ PAIN: ICD-10-CM

## 2021-08-06 DIAGNOSIS — I95.1 ORTHOSTASIS: ICD-10-CM

## 2021-08-06 DIAGNOSIS — R10.12 CHRONIC LUQ PAIN: Primary | ICD-10-CM

## 2021-08-06 PROCEDURE — 99214 OFFICE O/P EST MOD 30 MIN: CPT | Performed by: NURSE PRACTITIONER

## 2021-08-06 RX ORDER — FAMOTIDINE 20 MG/1
20 TABLET, FILM COATED ORAL DAILY
COMMUNITY
End: 2022-02-21 | Stop reason: SDUPTHER

## 2021-08-06 NOTE — PROGRESS NOTES
"Subjective   Kelvin Restrepo is a 89 y.o. female. Abdominal pain    History of Present Illness   Initially LLQ pain with straining, has moved up into LUQ. Always constipated. Takes stool softener daily to near daily. Laxative every 3-4 days Shackley product. Has never mentioned or tried anything else. Pain in LLQ started several years ago with straining only. Now is more intermittent, needs less a trigger and has moved up in last several months. Massaging seems to help.  The following portions of the patient's history were reviewed and updated as appropriate: allergies, current medications, past family history, past medical history, past social history, past surgical history and problem list.    Review of Systems   Constitutional: Negative for activity change.   Eyes: Negative.    Respiratory: Negative for cough.    Cardiovascular: Negative.    Gastrointestinal: Positive for abdominal pain, constipation and nausea. Negative for diarrhea and GERD.   Musculoskeletal: Positive for arthralgias and gait problem.   Neurological: Positive for dizziness.   Hematological: Negative.    Psychiatric/Behavioral: Positive for stress. The patient is nervous/anxious.      /88   Pulse 86   Ht 156.2 cm (61.5\")   Wt 70.3 kg (155 lb)   SpO2 97%   BMI 28.82 kg/m²     Objective   Physical Exam  Vitals and nursing note reviewed.   Constitutional:       Appearance: She is not ill-appearing or diaphoretic.   Abdominal:      General: Bowel sounds are decreased. There is no distension.      Tenderness: There is abdominal tenderness in the epigastric area, left upper quadrant and left lower quadrant. There is guarding. There is no rebound. Negative signs include Randhawa's sign, Rovsing's sign, McBurney's sign and psoas sign.      Hernia: No hernia is present.   Neurological:      Mental Status: She is alert and oriented to person, place, and time.      Motor: No weakness.      Gait: Gait abnormal.   Psychiatric:         Mood and " Affect: Mood normal.       Assessment/Plan   Problems Addressed this Visit     None      Visit Diagnoses     Chronic LUQ pain    -  Primary    Relevant Orders    Lipase    Amylase    Comprehensive Metabolic Panel    CT Abdomen Pelvis Without Contrast    Chronic LLQ pain        Relevant Orders    CT Abdomen Pelvis Without Contrast      Diagnoses       Codes Comments    Chronic LUQ pain    -  Primary ICD-10-CM: R10.12, G89.29  ICD-9-CM: 789.02, 338.29     Chronic LLQ pain     ICD-10-CM: R10.32, G89.29  ICD-9-CM: 789.04, 338.29         Chronic LLQ, LUQ pain now. Consider imaging, labs for pancreatitis secondary to absolute tenderness and guarding. Would try other approaches to constipation management. Ok to continue stool softeners for now. Consider adding miralax  orthostasis--rise slowly when changing positions. Consider decreasing lisinopril secondary to age and symptoms. Consider cane use    This document is intended for medical expert use only. Reading of this document by patients and/or patient's family without participating medical staff guidance may result in misinterpretation and unintended morbidity.  Any interpretation of such data is the responsibility of the patient and/or family member responsible for the patient in concert with their primary or specialist providers, not to be left for sources of online searches such as [employer], [employer] or similar queries. Relying on these approaches to knowledge may result in misinterpretation, misguided goals of care and even death should patients or family members try recommendations outside of the realm of professional medical care in a supervised way.    Please allow 3-5 business days for recommendations based on new results    Go to the ER for any possible lifethreatening symptoms such as chest pain or shortness of air.     I personally spent 25 minutes reviewing the chart before the visit, time with the patient, and time documenting the visit.

## 2021-08-07 LAB
ALBUMIN SERPL-MCNC: 4.2 G/DL (ref 3.5–5.2)
ALBUMIN/GLOB SERPL: 1.7 G/DL
ALP SERPL-CCNC: 80 U/L (ref 39–117)
ALT SERPL-CCNC: 18 U/L (ref 1–33)
AMYLASE SERPL-CCNC: 136 U/L (ref 28–100)
AST SERPL-CCNC: 23 U/L (ref 1–32)
BILIRUB SERPL-MCNC: 0.5 MG/DL (ref 0–1.2)
BUN SERPL-MCNC: 14 MG/DL (ref 8–23)
BUN/CREAT SERPL: 17.9 (ref 7–25)
CALCIUM SERPL-MCNC: 9.2 MG/DL (ref 8.2–9.6)
CHLORIDE SERPL-SCNC: 101 MMOL/L (ref 98–107)
CO2 SERPL-SCNC: 27.4 MMOL/L (ref 22–29)
CREAT SERPL-MCNC: 0.78 MG/DL (ref 0.57–1)
GLOBULIN SER CALC-MCNC: 2.5 GM/DL
GLUCOSE SERPL-MCNC: 72 MG/DL (ref 65–99)
LIPASE SERPL-CCNC: 52 U/L (ref 13–60)
POTASSIUM SERPL-SCNC: 4.1 MMOL/L (ref 3.5–5.2)
PROT SERPL-MCNC: 6.7 G/DL (ref 6–8.5)
SODIUM SERPL-SCNC: 140 MMOL/L (ref 136–145)

## 2021-08-12 ENCOUNTER — TELEPHONE (OUTPATIENT)
Dept: FAMILY MEDICINE CLINIC | Facility: CLINIC | Age: 86
End: 2021-08-12

## 2021-08-12 NOTE — TELEPHONE ENCOUNTER
Caller: Kelvin Restrepo    Relationship: Self    Best call back number: [phone]    Who are you requesting to speak with (clinical staff, provider,  specific staff member): CLINICAL STAFF     What was the call regarding: HAS QUESTIONS ABOUT CT SCAN SUPPOSE TO BE GETTING DONE     Do you require a callback: YES

## 2021-08-15 ENCOUNTER — TELEPHONE (OUTPATIENT)
Dept: FAMILY MEDICINE CLINIC | Facility: CLINIC | Age: 86
End: 2021-08-15

## 2021-08-16 NOTE — TELEPHONE ENCOUNTER
CT back and notes Epiploic Appendagitis.  This is fat that hangs off the colon that has twisted.  It is self limiting and goes away on own usually in a couple weeks.  Can take tylenol for the pain as needed.  It is considered self limiting and no real treatment for typically.

## 2021-10-11 ENCOUNTER — TELEPHONE (OUTPATIENT)
Dept: FAMILY MEDICINE CLINIC | Facility: CLINIC | Age: 86
End: 2021-10-11

## 2021-10-11 RX ORDER — LANOLIN ALCOHOL/MO/W.PET/CERES
1000 CREAM (GRAM) TOPICAL DAILY
Qty: 90 TABLET | Refills: 3 | Status: SHIPPED | OUTPATIENT
Start: 2021-10-11 | End: 2022-12-13 | Stop reason: SDUPTHER

## 2021-10-11 NOTE — TELEPHONE ENCOUNTER
Caller: Kelvin Restrepo    Relationship: Self      Medication requested (name and dosage):    vitamin B-12 (CYANOCOBALAMIN) 1000 MCG tablet         Pharmacy where request should be sent:[facility] -  [place], [place]  [address] - [phone]  - [phone] FX        Additional details provided by patient: HAS 2 OR 3 PILLS LEFT     Best call back number:[phone]    Does the patient have less than a 3 day supply:  [x] Yes  [] No    Redd Zepeda Rep   10/11/21 14:22 EDT

## 2021-11-09 ENCOUNTER — OFFICE VISIT (OUTPATIENT)
Dept: FAMILY MEDICINE CLINIC | Facility: CLINIC | Age: 86
End: 2021-11-09

## 2021-11-09 VITALS
DIASTOLIC BLOOD PRESSURE: 70 MMHG | BODY MASS INDEX: 29.45 KG/M2 | WEIGHT: 156 LBS | HEART RATE: 95 BPM | HEIGHT: 61 IN | SYSTOLIC BLOOD PRESSURE: 124 MMHG | OXYGEN SATURATION: 96 %

## 2021-11-09 DIAGNOSIS — E53.8 COBALAMIN DEFICIENCY: ICD-10-CM

## 2021-11-09 DIAGNOSIS — R53.82 CHRONIC FATIGUE: Primary | ICD-10-CM

## 2021-11-09 DIAGNOSIS — K59.09 CHRONIC CONSTIPATION: ICD-10-CM

## 2021-11-09 DIAGNOSIS — R42 DIZZINESS: ICD-10-CM

## 2021-11-09 DIAGNOSIS — Z90.11 H/O RIGHT MASTECTOMY: ICD-10-CM

## 2021-11-09 DIAGNOSIS — M81.0 AGE-RELATED OSTEOPOROSIS WITHOUT CURRENT PATHOLOGICAL FRACTURE: ICD-10-CM

## 2021-11-09 DIAGNOSIS — K21.9 GASTROESOPHAGEAL REFLUX DISEASE, UNSPECIFIED WHETHER ESOPHAGITIS PRESENT: ICD-10-CM

## 2021-11-09 DIAGNOSIS — Z23 NEED FOR INFLUENZA VACCINATION: ICD-10-CM

## 2021-11-09 DIAGNOSIS — I10 ESSENTIAL HYPERTENSION: ICD-10-CM

## 2021-11-09 DIAGNOSIS — E03.8 OTHER SPECIFIED HYPOTHYROIDISM: ICD-10-CM

## 2021-11-09 PROCEDURE — 99214 OFFICE O/P EST MOD 30 MIN: CPT | Performed by: NURSE PRACTITIONER

## 2021-11-09 PROCEDURE — 90686 IIV4 VACC NO PRSV 0.5 ML IM: CPT | Performed by: NURSE PRACTITIONER

## 2021-11-09 PROCEDURE — G0008 ADMIN INFLUENZA VIRUS VAC: HCPCS | Performed by: NURSE PRACTITIONER

## 2021-11-09 RX ORDER — FLUTICASONE PROPIONATE 50 MCG
2 SPRAY, SUSPENSION (ML) NASAL DAILY
Qty: 16 G | Refills: 2 | Status: SHIPPED | OUTPATIENT
Start: 2021-11-09

## 2021-11-10 LAB
25(OH)D3+25(OH)D2 SERPL-MCNC: 24.2 NG/ML (ref 30–100)
ALBUMIN SERPL-MCNC: 4.3 G/DL (ref 3.5–4.6)
ALBUMIN/GLOB SERPL: 1.5 {RATIO} (ref 1.2–2.2)
ALP SERPL-CCNC: 78 IU/L (ref 44–121)
ALT SERPL-CCNC: 17 IU/L (ref 0–32)
AST SERPL-CCNC: 19 IU/L (ref 0–40)
BASOPHILS # BLD AUTO: 0 X10E3/UL (ref 0–0.2)
BASOPHILS NFR BLD AUTO: 0 %
BILIRUB SERPL-MCNC: 0.4 MG/DL (ref 0–1.2)
BUN SERPL-MCNC: 13 MG/DL (ref 10–36)
BUN/CREAT SERPL: 16 (ref 12–28)
CALCIUM SERPL-MCNC: 9.4 MG/DL (ref 8.7–10.3)
CHLORIDE SERPL-SCNC: 100 MMOL/L (ref 96–106)
CHOLEST SERPL-MCNC: 151 MG/DL (ref 100–199)
CO2 SERPL-SCNC: 24 MMOL/L (ref 20–29)
CREAT SERPL-MCNC: 0.82 MG/DL (ref 0.57–1)
EOSINOPHIL # BLD AUTO: 0 X10E3/UL (ref 0–0.4)
EOSINOPHIL NFR BLD AUTO: 0 %
ERYTHROCYTE [DISTWIDTH] IN BLOOD BY AUTOMATED COUNT: 12.6 % (ref 11.7–15.4)
GLOBULIN SER CALC-MCNC: 2.9 G/DL (ref 1.5–4.5)
GLUCOSE SERPL-MCNC: 85 MG/DL (ref 65–99)
HCT VFR BLD AUTO: 39.6 % (ref 34–46.6)
HDLC SERPL-MCNC: 36 MG/DL
HGB BLD-MCNC: 13 G/DL (ref 11.1–15.9)
IMM GRANULOCYTES # BLD AUTO: 0 X10E3/UL (ref 0–0.1)
IMM GRANULOCYTES NFR BLD AUTO: 0 %
LDLC SERPL CALC-MCNC: 80 MG/DL (ref 0–99)
LYMPHOCYTES # BLD AUTO: 1.6 X10E3/UL (ref 0.7–3.1)
LYMPHOCYTES NFR BLD AUTO: 23 %
MAGNESIUM SERPL-MCNC: 2 MG/DL (ref 1.6–2.3)
MCH RBC QN AUTO: 28.8 PG (ref 26.6–33)
MCHC RBC AUTO-ENTMCNC: 32.8 G/DL (ref 31.5–35.7)
MCV RBC AUTO: 88 FL (ref 79–97)
MONOCYTES # BLD AUTO: 0.8 X10E3/UL (ref 0.1–0.9)
MONOCYTES NFR BLD AUTO: 12 %
NEUTROPHILS # BLD AUTO: 4.5 X10E3/UL (ref 1.4–7)
NEUTROPHILS NFR BLD AUTO: 65 %
PLATELET # BLD AUTO: 200 X10E3/UL (ref 150–450)
POTASSIUM SERPL-SCNC: 4.4 MMOL/L (ref 3.5–5.2)
PROT SERPL-MCNC: 7.2 G/DL (ref 6–8.5)
RBC # BLD AUTO: 4.51 X10E6/UL (ref 3.77–5.28)
SODIUM SERPL-SCNC: 139 MMOL/L (ref 134–144)
TRIGL SERPL-MCNC: 205 MG/DL (ref 0–149)
TSH SERPL DL<=0.005 MIU/L-ACNC: 0.92 UIU/ML (ref 0.45–4.5)
VIT B12 SERPL-MCNC: 871 PG/ML (ref 232–1245)
VLDLC SERPL CALC-MCNC: 35 MG/DL (ref 5–40)
WBC # BLD AUTO: 6.9 X10E3/UL (ref 3.4–10.8)

## 2021-11-10 NOTE — PROGRESS NOTES
Please call the patient regarding her lab results.  All labs are stable.  Thyroid is normal.  Vitamin D is slightly low and she may consider starting vitamin D 2000 international units daily.

## 2021-12-09 ENCOUNTER — TELEPHONE (OUTPATIENT)
Dept: FAMILY MEDICINE CLINIC | Facility: CLINIC | Age: 86
End: 2021-12-09

## 2021-12-09 DIAGNOSIS — E03.8 OTHER SPECIFIED HYPOTHYROIDISM: ICD-10-CM

## 2021-12-09 RX ORDER — LISINOPRIL 10 MG/1
10 TABLET ORAL DAILY
Qty: 30 TABLET | Refills: 5 | Status: SHIPPED | OUTPATIENT
Start: 2021-12-09 | End: 2022-06-13 | Stop reason: SDUPTHER

## 2021-12-09 RX ORDER — LEVOTHYROXINE SODIUM 112 UG/1
112 TABLET ORAL DAILY
Qty: 90 TABLET | Refills: 1 | Status: SHIPPED | OUTPATIENT
Start: 2021-12-09 | End: 2022-06-13 | Stop reason: SDUPTHER

## 2021-12-09 RX ORDER — ATORVASTATIN CALCIUM 10 MG/1
10 TABLET, FILM COATED ORAL DAILY
Qty: 90 TABLET | Refills: 1 | Status: SHIPPED | OUTPATIENT
Start: 2021-12-09 | End: 2022-06-13 | Stop reason: SDUPTHER

## 2021-12-09 NOTE — TELEPHONE ENCOUNTER
Caller: Kelvin Restrepo    Relationship: Self    Best call back number: [phone]    Requested Prescriptions:   Requested Prescriptions     Pending Prescriptions Disp Refills   • lisinopril (PRINIVIL,ZESTRIL) 10 MG tablet 30 tablet 5     Sig: Take 1 tablet by mouth Daily.   • levothyroxine (SYNTHROID, LEVOTHROID) 112 MCG tablet 90 tablet 1     Sig: Take 1 tablet by mouth Daily.   • atorvastatin (LIPITOR) 10 MG tablet 90 tablet 1     Sig: Take 1 tablet by mouth Daily.        Pharmacy where request should be sent: [facility], [place]  [address] [facility] - [phone]  - [phone] FX     Additional details provided by patient: PATIENT HAS ENOUGH TO GET HER THROUGH UNTIL MONDAY    Does the patient have less than a 3 day supply:  [x] Yes  [] No    Redd Seasl Rep   12/09/21 10:00 EST

## 2022-02-08 PROBLEM — Z90.11 H/O RIGHT MASTECTOMY: Status: ACTIVE | Noted: 2022-02-08

## 2022-02-21 ENCOUNTER — TELEPHONE (OUTPATIENT)
Dept: FAMILY MEDICINE CLINIC | Facility: CLINIC | Age: 87
End: 2022-02-21

## 2022-02-21 RX ORDER — MONTELUKAST SODIUM 10 MG/1
10 TABLET ORAL DAILY
Qty: 90 TABLET | Refills: 1 | Status: SHIPPED | OUTPATIENT
Start: 2022-02-21

## 2022-02-21 RX ORDER — ESOMEPRAZOLE MAGNESIUM 40 MG/1
40 CAPSULE, DELAYED RELEASE ORAL
Qty: 90 CAPSULE | Refills: 1 | Status: SHIPPED | OUTPATIENT
Start: 2022-02-21 | End: 2022-03-25

## 2022-02-21 RX ORDER — FAMOTIDINE 20 MG/1
20 TABLET, FILM COATED ORAL NIGHTLY PRN
Qty: 90 TABLET | Refills: 1 | Status: SHIPPED | OUTPATIENT
Start: 2022-02-21 | End: 2022-03-25

## 2022-02-21 NOTE — TELEPHONE ENCOUNTER
Caller: Kelvin Restrepo    Relationship: Self    Best call back number: [phone]    Requested Prescriptions:   Requested Prescriptions     Pending Prescriptions Disp Refills   • famotidine (PEPCID) 20 MG tablet       Sig: Take 1 tablet by mouth Daily.   • esomeprazole (nexIUM) 40 MG capsule 30 capsule 3     Sig: Take 1 capsule by mouth Every Morning Before Breakfast.   • montelukast (SINGULAIR) 10 MG tablet       Sig: Take 1 tablet by mouth Daily.        Ph[facility] where [facility] be sent[facility]  [place] [place]  [address] [phone] [facility] [phone] FX     Additional details provided by patient: PATIENT COMPLETELY OUT    Does the patient have less than a 3 day supply:  [x] Yes  [] No    Redd Crawford Rep   02/21/22 10:14 EST

## 2022-02-23 ENCOUNTER — TELEPHONE (OUTPATIENT)
Dept: FAMILY MEDICINE CLINIC | Facility: CLINIC | Age: 87
End: 2022-02-23

## 2022-02-23 RX ORDER — CETIRIZINE HYDROCHLORIDE 10 MG/1
10 TABLET ORAL DAILY
Qty: 90 TABLET | Refills: 2 | Status: SHIPPED | OUTPATIENT
Start: 2022-02-23

## 2022-02-23 NOTE — TELEPHONE ENCOUNTER
Caller: Kelvin Restrepo    Relationship: Self    Best call back number: [phone]    Requested Prescriptions:   Requested Prescriptions     Pending Prescriptions Disp Refills   • cetirizine (zyrTEC) 10 MG tablet       Sig: Take 1 tablet by mouth Daily.        Pharmacy where request should be sent: [facility] -  [place], [place]  [address] - [phone]  - [phone] FX     Additional details provided by patient: 2-3 LEFT    Does the patient have less than a 3 day supply:  [] Yes  [] No    Redd Timmons Rep   02/23/22 10:06 EST

## 2022-03-15 ENCOUNTER — OFFICE VISIT (OUTPATIENT)
Dept: FAMILY MEDICINE CLINIC | Facility: CLINIC | Age: 87
End: 2022-03-15

## 2022-03-15 VITALS
HEIGHT: 62 IN | BODY MASS INDEX: 28.89 KG/M2 | OXYGEN SATURATION: 97 % | HEART RATE: 83 BPM | WEIGHT: 157 LBS | SYSTOLIC BLOOD PRESSURE: 148 MMHG | DIASTOLIC BLOOD PRESSURE: 84 MMHG

## 2022-03-15 DIAGNOSIS — R53.82 CHRONIC FATIGUE: ICD-10-CM

## 2022-03-15 DIAGNOSIS — R10.2 PELVIC PAIN: Primary | ICD-10-CM

## 2022-03-15 DIAGNOSIS — E78.49 OTHER HYPERLIPIDEMIA: ICD-10-CM

## 2022-03-15 DIAGNOSIS — M89.9 DISORDER OF BONE, UNSPECIFIED: ICD-10-CM

## 2022-03-15 DIAGNOSIS — K62.5 RECTAL BLEEDING: ICD-10-CM

## 2022-03-15 DIAGNOSIS — R10.32 LEFT LOWER QUADRANT ABDOMINAL PAIN: ICD-10-CM

## 2022-03-15 PROCEDURE — 99213 OFFICE O/P EST LOW 20 MIN: CPT

## 2022-03-15 RX ORDER — CLOBETASOL PROPIONATE 0.5 MG/G
OINTMENT TOPICAL
COMMUNITY
Start: 2022-01-20 | End: 2022-11-07 | Stop reason: SDUPTHER

## 2022-03-15 NOTE — PROGRESS NOTES
"Subjective   Kelvin Restrepo is a 89 y.o. female. Who presents with complaints of abdominal pain and rectal bleed      History of Present Illness     Lower abdominal pain & Rectal bleeding  Chronic hx constipation- takes stool softner and laxatives as needed.   Had taken laxatives yesterday and went to bathroom 2 times and third time realized blood per rectum  States Felt like bowels going to move, and when sat on toilet then was just blood   Has also noticed blood on tissue  Has to strain really hard and sometimes has to get out manually.     Low pelvic pain occasionally, right before bowels move.       Hx hemorrhoids. No rectal pain. Does have anal pain but used to have with hemorrhoids     Pt would also like to check labs as due. Also states taking vitamin supplements and would like levels checked  The following portions of the patient's history were reviewed and updated as appropriate: allergies, current medications, past family history, past medical history, past social history and past surgical history.    Review of Systems   Constitutional: Negative for chills, fatigue, fever and unexpected weight loss.   Gastrointestinal: Positive for abdominal pain, anal bleeding and constipation. Negative for abdominal distention, blood in stool, diarrhea and nausea.   Genitourinary: Negative for decreased urine volume and dysuria.   Musculoskeletal: Negative for back pain.   Neurological: Negative for dizziness.       Objective    /84   Pulse 83   Ht 156.2 cm (61.5\")   Wt 71.2 kg (157 lb)   SpO2 97%   BMI 29.18 kg/m²     Physical Exam  Constitutional:       Appearance: Normal appearance. She is not ill-appearing.   Cardiovascular:      Rate and Rhythm: Normal rate and regular rhythm.      Pulses: Normal pulses.      Heart sounds: Normal heart sounds, S1 normal and S2 normal. No murmur heard.  Pulmonary:      Effort: Pulmonary effort is normal. No respiratory distress.      Breath sounds: Normal breath sounds. "   Abdominal:      General: Bowel sounds are normal. There is no distension.      Palpations: Abdomen is soft. There is no hepatomegaly, splenomegaly, mass or pulsatile mass.      Tenderness: There is abdominal tenderness in the suprapubic area.      Hernia: No hernia is present.   Neurological:      General: No focal deficit present.      Mental Status: She is alert and oriented to person, place, and time.   Psychiatric:         Attention and Perception: Attention normal.         Mood and Affect: Mood normal.         Speech: Speech normal.         Behavior: Behavior normal.         Thought Content: Thought content normal.         Cognition and Memory: Cognition normal.         Judgment: Judgment normal.       Assessment/Plan   Diagnoses and all orders for this visit:    1. Pelvic pain (Primary)  -     CT Abdomen Pelvis Without Contrast; Future    2. Left lower quadrant abdominal pain  -     CT Abdomen Pelvis Without Contrast; Future    3. Chronic fatigue  -     CBC (No Diff)  -     Comprehensive metabolic panel  -     Vitamin D 25 hydroxy  -     Vitamin B12    4. Other hyperlipidemia  -     Comprehensive metabolic panel  -     Lipid Panel    5. Rectal bleeding  -     CBC (No Diff)    6. Disorder of bone, unspecified   -     Vitamin D 25 hydroxy      Tenderness mostly in pelvic area on exam. Will get CT abd and pelvis.   Discuss bleeding could be due to hemorrhoids as one time episode and happened after constipation episode. Will get stool for occult blood, container and needed supply given to pt to bring stool sample back. Will also check CBC and rest of labs as due and requested per pt as well.     Advised to stay hydrated and increase fiber in diet. PRN laxative ok for now, consider miralax or metamucil daily.     Will get results of testing and advise further.    Follow up as needed.     This document is intended for medical expert use only. Reading of this document by patients and/or patient's family without  participating medical staff guidance may result in misinterpretation and unintended morbidity.  Any interpretation of such data is the responsibility of the patient and/or family member responsible for the patient in concert with their primary or specialist providers, not to be left for sources of online searches such as [employer], [employer] or similar queries. Relying on these approaches to knowledge may result in misinterpretation, misguided goals of care and even death should patients or family members try recommendations outside of the realm of professional medical care in a supervised way.     Please allow 3-5 business days for recommendations based on new results     Go to the ER for any possible lifethreatening symptoms such as severe pain, chest pain or shortness of air.      I personally spent 20 minutes with this patient, preparing for the visit, reviewing tests, obtaining and/or reviewing a separately obtained history, performing a medically appropriate examination and/or evaluation , counseling and educating the patient/family/caregiver, ordering medications, tests, or procedures, documenting information in the medical record and independently interpreting results.

## 2022-03-16 LAB
25(OH)D3+25(OH)D2 SERPL-MCNC: 28.1 NG/ML (ref 30–100)
ALBUMIN SERPL-MCNC: 4.3 G/DL (ref 3.5–4.6)
ALBUMIN/GLOB SERPL: 1.5 {RATIO} (ref 1.2–2.2)
ALP SERPL-CCNC: 78 IU/L (ref 44–121)
ALT SERPL-CCNC: 20 IU/L (ref 0–32)
AST SERPL-CCNC: 23 IU/L (ref 0–40)
BILIRUB SERPL-MCNC: 0.5 MG/DL (ref 0–1.2)
BUN SERPL-MCNC: 10 MG/DL (ref 10–36)
BUN/CREAT SERPL: 11 (ref 12–28)
CALCIUM SERPL-MCNC: 9.2 MG/DL (ref 8.7–10.3)
CHLORIDE SERPL-SCNC: 98 MMOL/L (ref 96–106)
CHOLEST SERPL-MCNC: 162 MG/DL (ref 100–199)
CO2 SERPL-SCNC: 24 MMOL/L (ref 20–29)
CREAT SERPL-MCNC: 0.89 MG/DL (ref 0.57–1)
EGFRCR SERPLBLD CKD-EPI 2021: 62 ML/MIN/1.73
ERYTHROCYTE [DISTWIDTH] IN BLOOD BY AUTOMATED COUNT: 12.8 % (ref 11.7–15.4)
GLOBULIN SER CALC-MCNC: 2.9 G/DL (ref 1.5–4.5)
GLUCOSE SERPL-MCNC: 84 MG/DL (ref 65–99)
HCT VFR BLD AUTO: 41.1 % (ref 34–46.6)
HDLC SERPL-MCNC: 40 MG/DL
HGB BLD-MCNC: 13.5 G/DL (ref 11.1–15.9)
LDLC SERPL CALC-MCNC: 93 MG/DL (ref 0–99)
MCH RBC QN AUTO: 28.8 PG (ref 26.6–33)
MCHC RBC AUTO-ENTMCNC: 32.8 G/DL (ref 31.5–35.7)
MCV RBC AUTO: 88 FL (ref 79–97)
PLATELET # BLD AUTO: 221 X10E3/UL (ref 150–450)
POTASSIUM SERPL-SCNC: 3.8 MMOL/L (ref 3.5–5.2)
PROT SERPL-MCNC: 7.2 G/DL (ref 6–8.5)
RBC # BLD AUTO: 4.68 X10E6/UL (ref 3.77–5.28)
SODIUM SERPL-SCNC: 136 MMOL/L (ref 134–144)
TRIGL SERPL-MCNC: 167 MG/DL (ref 0–149)
VIT B12 SERPL-MCNC: 1125 PG/ML (ref 232–1245)
VLDLC SERPL CALC-MCNC: 29 MG/DL (ref 5–40)
WBC # BLD AUTO: 9.4 X10E3/UL (ref 3.4–10.8)

## 2022-03-16 NOTE — PROGRESS NOTES
Please inform pt of lab results.     Vitamin D mildly low. May take daily OTC vitamin D3 1000mcg per day.   Other labs normal.     Repeat labs in 6 months.

## 2022-03-17 ENCOUNTER — TELEPHONE (OUTPATIENT)
Dept: FAMILY MEDICINE CLINIC | Facility: CLINIC | Age: 87
End: 2022-03-17

## 2022-03-17 NOTE — TELEPHONE ENCOUNTER
Pt  came by today and said pt hasn't had a bowl movement since Monday, 3/14 and they want to know what do?  She saw [facility] earlier this week and [facility]al wanted a stool sample but hasn't been able to go. Please advise.

## 2022-03-18 ENCOUNTER — CLINICAL SUPPORT (OUTPATIENT)
Dept: FAMILY MEDICINE CLINIC | Facility: CLINIC | Age: 87
End: 2022-03-18

## 2022-03-18 DIAGNOSIS — R19.5 POSITIVE FECAL OCCULT BLOOD TEST: ICD-10-CM

## 2022-03-18 DIAGNOSIS — K62.5 RECTAL BLEEDING: Primary | ICD-10-CM

## 2022-03-18 LAB
DEVELOPER EXPIRATION DATE: ABNORMAL
DEVELOPER LOT NUMBER: ABNORMAL
EXPIRATION DATE: ABNORMAL
FECAL OCCULT BLOOD SCREEN, POC: POSITIVE
Lab: ABNORMAL
NEGATIVE CONTROL: NEGATIVE
POSITIVE CONTROL: POSITIVE

## 2022-03-18 PROCEDURE — 82270 OCCULT BLOOD FECES: CPT

## 2022-03-18 NOTE — PROGRESS NOTES
Please inform pt that her stool was positive for blood. I have sent a referral for her to see GI specialist. Let us know if does not hear from anyone to schedule.     If severe symptoms go to ED

## 2022-03-18 NOTE — TELEPHONE ENCOUNTER
Pick-up montes milk of magnesia and take 1 capful twice daily for next 2 days.  Also start miralax 1 capful daily.   RRJ

## 2022-03-22 ENCOUNTER — TELEPHONE (OUTPATIENT)
Dept: FAMILY MEDICINE CLINIC | Facility: CLINIC | Age: 87
End: 2022-03-22

## 2022-03-22 NOTE — TELEPHONE ENCOUNTER
Caller: Ashwini Sanchez    Relationship: Self    Best call back number:[phone] (H)    Caller requesting test results: ASHWINI SANCHEZ    What test was performed: CT SCAN    When was the test performed: 03/21/2022    Where was the test performed: [facility]    Additional notes: PATIENT WOULD LIKE A CALL BACK ASAP WITH RESULTS

## 2022-03-24 DIAGNOSIS — R10.2 PELVIC PAIN: ICD-10-CM

## 2022-03-24 DIAGNOSIS — R10.32 LEFT LOWER QUADRANT ABDOMINAL PAIN: ICD-10-CM

## 2022-03-24 NOTE — PROGRESS NOTES
Please inform pt that her CT abdomen showed diverticulosis without inflammation and pelvic floor relaxation. None of this would necessarily be the cause of the symptoms that she was having. Follow with GI as scheduled. If having any low pelvic issues with can then send to urogyn.

## 2022-03-25 ENCOUNTER — OFFICE VISIT (OUTPATIENT)
Dept: GASTROENTEROLOGY | Facility: CLINIC | Age: 87
End: 2022-03-25

## 2022-03-25 VITALS
HEIGHT: 61 IN | DIASTOLIC BLOOD PRESSURE: 74 MMHG | HEART RATE: 83 BPM | TEMPERATURE: 98.1 F | WEIGHT: 156 LBS | SYSTOLIC BLOOD PRESSURE: 132 MMHG | BODY MASS INDEX: 29.45 KG/M2 | RESPIRATION RATE: 20 BRPM | OXYGEN SATURATION: 98 %

## 2022-03-25 DIAGNOSIS — K59.00 CONSTIPATION, UNSPECIFIED CONSTIPATION TYPE: Chronic | ICD-10-CM

## 2022-03-25 DIAGNOSIS — K62.5 RECTAL BLEEDING: Primary | ICD-10-CM

## 2022-03-25 PROCEDURE — 99213 OFFICE O/P EST LOW 20 MIN: CPT | Performed by: NURSE PRACTITIONER

## 2022-03-25 NOTE — PATIENT INSTRUCTIONS
For constipation, you may continue milk of magnesia daily, following package instructions.    2.  Should your rectal bleeding recur, please contact our office for further recommendations.

## 2022-03-25 NOTE — PROGRESS NOTES
"Chief Complaint   Patient presents with   • GI Bleeding     New pt est care hx rectal bleeding    • Establish Care   • Constipation   • Abdominal Pain     Pain and bloating            History of Present Illness  89-year-old female presents the office today for evaluation.  She reports having had a lifelong history of constipation. Previously she used a Shaklee product and stool softener, but at times could go almost a week between bowel movements.  Most recently she would average 1-2 bowel movements per week until she started taking milk of magnesia.  She can take 1-2 capfuls a day and will produce a soft complete bowel movement, and states that she feels great afterwards.    She reports experiencing one episode of rectal bleeding following a bowel movement.  She has not seen any further bright red blood per rectum since.  She denies any melena or abdominal pain.  Previously when she would become constipated she would have cramping and bloating as associated symptoms.  She does take a daily probiotic.  It has been sometime since her last colonoscopy.  She denies any family history of colon cancer or personal history of colon polyps.    She denies any heartburn, reflux, nausea, vomiting, or dysphagia.  Her weight is stable.    Review of Systems   Gastrointestinal: Positive for anal bleeding and constipation.        Result Review :       CT Abdomen Pelvis Without Contrast (03/21/2022)  CT Abdomen Pelvis Without Contrast (08/15/2021)  Comprehensive metabolic panel (03/15/2022 11:05)      Vital Signs:   /74 (BP Location: Left arm, Patient Position: Sitting, Cuff Size: Adult)   Pulse 83   Temp 98.1 °F (36.7 °C) (Temporal)   Resp 20   Ht 156.2 cm (61.5\")   Wt 70.8 kg (156 lb)   SpO2 98%   BMI 29.00 kg/m²     Body mass index is 29 kg/m².     Physical Exam  Vitals reviewed.   Constitutional:       Appearance: Normal appearance.   HENT:      Head: Normocephalic.      Nose: Nose normal.      Mouth/Throat:      " Mouth: Mucous membranes are moist.   Eyes:      General: No scleral icterus.     Extraocular Movements: Extraocular movements intact.   Cardiovascular:      Rate and Rhythm: Normal rate and regular rhythm.      Pulses: Normal pulses.      Heart sounds: Normal heart sounds.   Pulmonary:      Effort: Pulmonary effort is normal. No respiratory distress.      Breath sounds: Normal breath sounds.   Abdominal:      General: Abdomen is flat. Bowel sounds are normal. There is no distension.      Palpations: Abdomen is soft. There is no mass.      Tenderness: There is no abdominal tenderness. There is no guarding.   Musculoskeletal:         General: Normal range of motion.      Cervical back: Normal range of motion and neck supple.   Skin:     General: Skin is warm and dry.   Neurological:      General: No focal deficit present.      Mental Status: She is alert and oriented to person, place, and time.   Psychiatric:         Mood and Affect: Mood normal.         Behavior: Behavior normal.         Thought Content: Thought content normal.         Judgment: Judgment normal.       Assessment and Plan    Diagnoses and all orders for this visit:    1. Rectal bleeding (Primary)  Comments:  resolved    2. Constipation, unspecified constipation type           Patient Instructions   1. For constipation, you may continue milk of magnesia daily, following package instructions.    2.  Should your rectal bleeding recur, please contact our office for further recommendations.      Discussion:         EMR Dragon/Transcription Disclaimer:  This document has been Dictated utilizing Dragon dictation.

## 2022-03-28 ENCOUNTER — TELEPHONE (OUTPATIENT)
Dept: FAMILY MEDICINE CLINIC | Facility: CLINIC | Age: 87
End: 2022-03-28

## 2022-03-28 NOTE — TELEPHONE ENCOUNTER
PATIENT HAD A CT SCAN DONE ON 03/15/22 AND IS UPSET HAS NOT HEARD BACK ON IT YET. STILL NOT FEELING WELL.    PLEASE ADVISE  [phone]

## 2022-03-30 ENCOUNTER — OFFICE VISIT (OUTPATIENT)
Dept: FAMILY MEDICINE CLINIC | Facility: CLINIC | Age: 87
End: 2022-03-30

## 2022-03-30 VITALS
DIASTOLIC BLOOD PRESSURE: 84 MMHG | WEIGHT: 153 LBS | HEIGHT: 62 IN | OXYGEN SATURATION: 98 % | HEART RATE: 94 BPM | BODY MASS INDEX: 28.16 KG/M2 | SYSTOLIC BLOOD PRESSURE: 142 MMHG

## 2022-03-30 DIAGNOSIS — F41.0 PANIC ATTACK: ICD-10-CM

## 2022-03-30 DIAGNOSIS — E53.8 COBALAMIN DEFICIENCY: ICD-10-CM

## 2022-03-30 DIAGNOSIS — E03.8 OTHER SPECIFIED HYPOTHYROIDISM: ICD-10-CM

## 2022-03-30 DIAGNOSIS — I10 ESSENTIAL HYPERTENSION: ICD-10-CM

## 2022-03-30 DIAGNOSIS — E55.9 VITAMIN D DEFICIENCY: ICD-10-CM

## 2022-03-30 DIAGNOSIS — K59.09 CHRONIC CONSTIPATION: ICD-10-CM

## 2022-03-30 DIAGNOSIS — Z00.00 MEDICARE ANNUAL WELLNESS VISIT, SUBSEQUENT: Primary | ICD-10-CM

## 2022-03-30 DIAGNOSIS — E78.49 OTHER HYPERLIPIDEMIA: ICD-10-CM

## 2022-03-30 DIAGNOSIS — E66.3 OVERWEIGHT (BMI 25.0-29.9): ICD-10-CM

## 2022-03-30 PROCEDURE — 1170F FXNL STATUS ASSESSED: CPT | Performed by: FAMILY MEDICINE

## 2022-03-30 PROCEDURE — G0439 PPPS, SUBSEQ VISIT: HCPCS | Performed by: FAMILY MEDICINE

## 2022-03-30 PROCEDURE — 99214 OFFICE O/P EST MOD 30 MIN: CPT | Performed by: FAMILY MEDICINE

## 2022-03-30 PROCEDURE — 1159F MED LIST DOCD IN RCRD: CPT | Performed by: FAMILY MEDICINE

## 2022-03-30 PROCEDURE — 1126F AMNT PAIN NOTED NONE PRSNT: CPT | Performed by: FAMILY MEDICINE

## 2022-03-30 RX ORDER — LORAZEPAM 0.5 MG/1
TABLET ORAL
Qty: 30 TABLET | Refills: 1 | Status: SHIPPED | OUTPATIENT
Start: 2022-03-30 | End: 2022-09-21 | Stop reason: SDUPTHER

## 2022-03-30 NOTE — PATIENT INSTRUCTIONS
Advance Care Planning and Advance Directives     You make decisions on a daily basis - decisions about where you want to live, your career, your home, your life. Perhaps one of the most important decisions you face is your choice for future medical care. Take time to talk with your family and your healthcare team and start planning today.  Advance Care Planning is a process that can help you:  Understand possible future healthcare decisions in light of your own experiences  Reflect on those decision in light of your goals and values  Discuss your decisions with those closest to you and the healthcare professionals that care for you  Make a plan by creating a document that reflects your wishes    Surrogate Decision Maker  In the event of a medical emergency, which has left you unable to communicate or to make your own decisions, you would need someone to make decisions for you.  It is important to discuss your preferences for medical treatment with this person while you are in good health.     Qualities of a surrogate decision maker:  Willing to take on this role and responsibility  Knows what you want for future medical care  Willing to follow your wishes even if they don't agree with them  Able to make difficult medical decisions under stressful circumstances    Advance Directives  These are legal documents you can create that will guide your healthcare team and decision maker(s) when needed. These documents can be stored in the electronic medical record.    Living Will - a legal document to guide your care if you have a terminal condition or a serious illness and are unable to communicate. States vary by statute in document names/types, but most forms may include one or more of the following:        -  Directions regarding life-prolonging treatments        -  Directions regarding artificially provided nutrition/hydration        -  Choosing a healthcare decision maker        -  Direction regarding organ/tissue  "donation    Durable Power of  for Healthcare - this document names an -in-fact to make medical decisions for you, but it may also allow this person to make personal and financial decisions for you. Please seek the advice of an  if you need this type of document.    **Advance Directives are not required and no one may discriminate against you if you do not sign one.    Medical Orders  Many states allow specific forms/orders signed by your physician to record your wishes for medical treatment in your current state of health. This form, signed in personal communication with your physician, addresses resuscitation and other medical interventions that you may or may not want.      For more information or to schedule a time with a [facility] Advance Care Planning Facilitator contact: [facility] or call [phone] and someone will contact you directly.\"[phone] None!\"  Healthy Habits Start Early    EAT 5 OR MORE SERVINGS OF VEGETABLES AND FRUITS EVERY DAY.    Help Kelvin get three vegetables and two fruits each day. Red, green, yellow, orange...encourage them to try all the colors so they can enjoy different flavors and get more vitamins.    How can I help Kelvin do this?  ---------------------------------------------  -BE PATIENT WITH Kelvin, remember it may take 10 times before they start to like new food. So, start with small bites and just keep trying.  -Serve at least one vegetable or fruit at every meal. Even try two. Remember, portions do not have to be as big as you think.  -Encourage eating fruits and vegetables instead of drinking them..it's a better way to get fiber and vitamins..so limit the amount of juice to 1/2 cup per day for children 1-6 years and one cup per day for children 7-18 years of age. Try using 1/2 part water and 1/2 part juice.    Spend less than two hours per day watching television and other screen media. Screen media includes video games, movies " and computer use for entertainment.    How can I help Jacobjoycelyn do this?  -Turn off the TV at dinner. Dinner is the best time to hang out with your kids and just talk, learn about their day, and tell them about your day. Your kids have a lot to learn from you and dinner is a great time to share.

## 2022-03-30 NOTE — PROGRESS NOTES
QUICK REFERENCE INFORMATION:  The ABCs of the Annual Wellness Visit    Subsequent Medicare Wellness Visit    HEALTH RISK ASSESSMENT    3/19/1931    Recent Hospitalizations:  No hospitalization(s) within the last year..        Current Medical Providers:  Patient Care Team:  Leoncio Stoll DO as PCP - General  Elizabeth López APRN as Nurse Practitioner (Gastroenterology)        Smoking Status:  Social History     Tobacco Use   Smoking Status Never Smoker   Smokeless Tobacco Never Used       Alcohol Consumption:  Social History     Substance and Sexual Activity   Alcohol Use No       Depression Screen:   PHQ-2/PHQ-9 Depression Screening 3/30/2022   Retired PHQ-9 Total Score -   Retired Total Score -   Little Interest or Pleasure in Doing Things 0-->not at all   Feeling Down, Depressed or Hopeless 0-->not at all   PHQ-9: Brief Depression Severity Measure Score 0       Health Habits and Functional and Cognitive Screening:  Functional & Cognitive Status 3/30/2022   Do you have difficulty preparing food and eating? No   Do you have difficulty bathing yourself, getting dressed or grooming yourself? No   Do you have difficulty using the toilet? No   Do you have difficulty moving around from place to place? No   Do you have trouble with steps or getting out of a bed or a chair? No   Current Diet Well Balanced Diet   Dental Exam Up to date   Eye Exam Up to date   Exercise (times per week) 2 times per week   Current Exercises Include House Cleaning   Current Exercise Activities Include -   Do you need help using the phone?  No   Are you deaf or do you have serious difficulty hearing?  No   Do you need help with transportation? No   Do you need help shopping? No   Do you need help preparing meals?  No   Do you need help with housework?  No   Do you need help with laundry? No   Do you need help taking your medications? No   Do you need help managing money? No   Do you ever drive or ride in a car without wearing a seat belt?  No   Have you felt unusual stress, anger or loneliness in the last month? No   Who do you live with? Spouse   If you need help, do you have trouble finding someone available to you? No   Have you been bothered in the last four weeks by sexual problems? No   Do you have difficulty concentrating, remembering or making decisions? No           Does the patient have evidence of cognitive impairment? No    Aspirin use counseling:       Recent Lab Results:  CMP:  Lab Results   Component Value Date    BUN 10 03/15/2022    CREATININE 0.89 03/15/2022    EGFRIFNONA 63 11/09/2021    EGFRIFAFRI 73 11/09/2021    BCR 11 (L) 03/15/2022     03/15/2022    K 3.8 03/15/2022    CO2 24 03/15/2022    CALCIUM 9.2 03/15/2022    PROTENTOTREF 7.2 03/15/2022    ALBUMIN 4.3 03/15/2022    LABGLOBREF 2.9 03/15/2022    LABIL2 1.5 03/15/2022    BILITOT 0.5 03/15/2022    ALKPHOS 78 03/15/2022    AST 23 03/15/2022    ALT 20 03/15/2022     Lipid Panel:  Lab Results   Component Value Date    TRIG 167 (H) 03/15/2022    HDL 40 03/15/2022    VLDL 29 03/15/2022     HbA1c:       Visual Acuity:  No exam data present    Age-appropriate Screening Schedule:  Refer to the list below for future screening recommendations based on patient's age, sex and/or medical conditions. Orders for these recommended tests are listed in the plan section. The patient has been provided with a written plan.    Health Maintenance   Topic Date Due   • MAMMOGRAM  10/08/2021   • LIPID PANEL  03/15/2023   • DXA SCAN  04/27/2023   • TDAP/TD VACCINES (2 - Td or Tdap) 09/15/2026   • INFLUENZA VACCINE  Completed        Subjective   History of Present Illness    Kelvin Restrepo is a 89 y.o. female who presents for an Subsequent Wellness Visit.    The following portions of the patient's history were reviewed and updated as appropriate: allergies, current medications, past family history, past medical history, past social history, past surgical history and problem list.    Outpatient  Medications Prior to Visit   Medication Sig Dispense Refill   • aspirin 325 MG tablet Take 1 tablet by mouth Daily. PLEASE CALL THE OFFICE FOR AN APPT 15 tablet 0   • atorvastatin (LIPITOR) 10 MG tablet Take 1 tablet by mouth Daily. 90 tablet 1   • cetirizine (zyrTEC) 10 MG tablet Take 1 tablet by mouth Daily. 90 tablet 2   • clobetasol (TEMOVATE) 0.05 % ointment      • fluticasone (Flonase) 50 MCG/ACT nasal spray 2 sprays into the nostril(s) as directed by provider Daily. 16 g 2   • levothyroxine (SYNTHROID, LEVOTHROID) 112 MCG tablet Take 1 tablet by mouth Daily. 90 tablet 1   • lisinopril (PRINIVIL,ZESTRIL) 10 MG tablet Take 1 tablet by mouth Daily. 30 tablet 5   • montelukast (SINGULAIR) 10 MG tablet Take 1 tablet by mouth Daily. 90 tablet 1   • Multiple Minerals-Vitamins (CALCIUM & VIT D3 BONE HEALTH PO) Take 1 tablet by mouth Daily.     • multivitamin with minerals tablet tablet Take 1 tablet by mouth Daily.     • Probiotic Product (ALIGN PO) Take  by mouth.     • vitamin B-12 (CYANOCOBALAMIN) 1000 MCG tablet Take 1 tablet by mouth Daily. 90 tablet 3     No facility-administered medications prior to visit.       Patient Active Problem List   Diagnosis   • Suprapubic pain   • Allergic state   • Chronic constipation   • Gastroesophageal reflux disease   • Fatigue   • Hyperlipidemia   • Hypothyroidism   • Insomnia   • Obstructive sleep apnea syndrome   • Osteoarthritis of multiple joints   • Osteoporosis   • Cobalamin deficiency   • Essential hypertension   • Anxiety   • H/O right mastectomy       Advance Care Planning:  ACP discussion was held with the patient during this visit. Patient does not have an advance directive, information provided.    Identification of Risk Factors:  Risk factors include: Obesity/Overweight .    Review of Systems   Constitutional: Negative for fever.   Respiratory: Negative for shortness of breath.    Cardiovascular: Negative for chest pain, palpitations, orthopnea and PND.  "  Gastrointestinal: Positive for bloating, change in bowel habit, constipation and hematochezia (x1 only). Negative for abdominal pain and melena.       Compared to one year ago, the patient feels her physical health is the same.  Compared to one year ago, the patient feels her mental health is the same.    Objective     Physical Exam  Vitals and nursing note reviewed.   Constitutional:       Appearance: She is well-developed.   HENT:      Head: Normocephalic and atraumatic.   Neck:      Thyroid: No thyromegaly.      Vascular: No JVD.   Cardiovascular:      Rate and Rhythm: Normal rate and regular rhythm.      Heart sounds: Normal heart sounds. No murmur heard.    No friction rub. No gallop.   Pulmonary:      Effort: Pulmonary effort is normal. No respiratory distress.      Breath sounds: Normal breath sounds. No wheezing or rales.   Abdominal:      General: Bowel sounds are normal. There is no distension.      Palpations: Abdomen is soft.      Tenderness: There is no abdominal tenderness. There is no guarding or rebound.   Musculoskeletal:      Cervical back: Neck supple.   Skin:     General: Skin is warm and dry.   Neurological:      Mental Status: She is alert.   Psychiatric:         Behavior: Behavior normal.         Vitals:    03/30/22 0825   BP: 142/84   Pulse: 94   SpO2: 98%   Weight: 69.4 kg (153 lb)   Height: 156.2 cm (61.5\")   PainSc: 0-No pain       Patient's Body mass index is 28.44 kg/m². indicating that she is overweight (BMI 25-29.9). Patient's (Body mass index is 28.44 kg/m².) indicates that they are overweight with health conditions that include hypertension . Weight is improving with lifestyle modifications. BMI is is above average; BMI management plan is completed. We discussed portion control and increasing exercise. .      Assessment/Plan   Patient Self-Management and Personalized Health Advice  The patient has been provided with information about: diet and exercise and preventive services " including:   · Annual Wellness Visit (AWV).    Visit Diagnoses:    ICD-10-CM ICD-9-CM   1. Medicare annual wellness visit, subsequent  Z00.00 V70.0   2. Essential hypertension  I10 401.9   3. Chronic constipation  K59.09 564.00   4. Other hyperlipidemia  E78.49 272.4   5. Other specified hypothyroidism  E03.8 244.8   6. Cobalamin deficiency  E53.8 266.2   7. Vitamin D deficiency  E55.9 268.9   8. Panic attack  F41.0 300.01   9. Overweight (BMI 25.0-29.9)  E66.3 278.02       No orders of the defined types were placed in this encounter.      Outpatient Encounter Medications as of 3/30/2022   Medication Sig Dispense Refill   • aspirin 325 MG tablet Take 1 tablet by mouth Daily. PLEASE CALL THE OFFICE FOR AN APPT 15 tablet 0   • atorvastatin (LIPITOR) 10 MG tablet Take 1 tablet by mouth Daily. 90 tablet 1   • cetirizine (zyrTEC) 10 MG tablet Take 1 tablet by mouth Daily. 90 tablet 2   • clobetasol (TEMOVATE) 0.05 % ointment      • fluticasone (Flonase) 50 MCG/ACT nasal spray 2 sprays into the nostril(s) as directed by provider Daily. 16 g 2   • levothyroxine (SYNTHROID, LEVOTHROID) 112 MCG tablet Take 1 tablet by mouth Daily. 90 tablet 1   • lisinopril (PRINIVIL,ZESTRIL) 10 MG tablet Take 1 tablet by mouth Daily. 30 tablet 5   • montelukast (SINGULAIR) 10 MG tablet Take 1 tablet by mouth Daily. 90 tablet 1   • Multiple Minerals-Vitamins (CALCIUM & VIT D3 BONE HEALTH PO) Take 1 tablet by mouth Daily.     • multivitamin with minerals tablet tablet Take 1 tablet by mouth Daily.     • Probiotic Product (ALIGN PO) Take  by mouth.     • vitamin B-12 (CYANOCOBALAMIN) 1000 MCG tablet Take 1 tablet by mouth Daily. 90 tablet 3   • LORazepam (Ativan) 0.5 MG tablet 1/2 po daily prn panic attack, use sparingly 30 tablet 1     No facility-administered encounter medications on file as of 3/30/2022.       Reviewed use of high risk medication in the elderly: yes  Reviewed for potential of harmful drug interactions in the elderly:  yes    Follow Up:  Return in about 6 months (around 9/30/2022), or if symptoms worsen or fail to improve.     An After Visit Summary and PPPS with all of these plans were given to the patient.           ++++++++++++++++++++++++++++++++++++++++++++++++++++++++++++++++++     Chief Complaint   Patient presents with   • Annual Exam     Medicare     • Hypothyroidism   • Hyperlipidemia   • Hypertension     Hypothyroidism  This is a chronic problem. The current episode started more than 1 year ago. The problem has been unchanged. Associated symptoms include a change in bowel habit. Pertinent negatives include no abdominal pain, chest pain or fever. She has tried nothing for the symptoms.   Hyperlipidemia  This is a chronic problem. The current episode started more than 1 year ago. The problem is controlled. Recent lipid tests were reviewed and are normal. Exacerbating diseases include hypothyroidism. Pertinent negatives include no chest pain or shortness of breath. Current antihyperlipidemic treatment includes statins. The current treatment provides moderate improvement of lipids. There are no compliance problems.  Risk factors for coronary artery disease include dyslipidemia, hypertension and post-menopausal.   Hypertension  This is a chronic problem. The current episode started more than 1 year ago. The problem is unchanged. The problem is controlled. Pertinent negatives include no chest pain, orthopnea, palpitations, peripheral edema, PND or shortness of breath. The current treatment provides moderate improvement.   Constipation  This is a new problem. The current episode started 1 to 4 weeks ago. The problem has been waxing and waning since onset. The stool is described as blood coated and firm. The patient is on a high fiber diet. There has been adequate water intake. Associated symptoms include bloating and hematochezia (x1 only). Pertinent negatives include no abdominal pain, fever or melena. Associated symptoms  "comments: Had CT scan noted diverticulosis but no inflammation;  Had moderate pelvic floor relaxation but reports no issues with incontinence urinary or fecal.. She has tried stool softeners and laxatives (Took MOM with relief) for the symptoms.     Panic attacks, had ativan in past, we discussed serious risks and would highly recommend avoid, but ok occly for panic attacks.     Review of Systems   Constitutional: Negative for fever.   Cardiovascular: Negative for chest pain, orthopnea, palpitations and paroxysmal nocturnal dyspnea.   Respiratory: Negative for shortness of breath.    Gastrointestinal: Positive for bloating, change in bowel habit, constipation and hematochezia (x1 only). Negative for abdominal pain and melena.       Social History     Tobacco Use   • Smoking status: Never Smoker   • Smokeless tobacco: Never Used   Substance Use Topics   • Alcohol use: No     O:   Vitals:    03/30/22 0825   BP: 142/84   Pulse: 94   SpO2: 98%   Weight: 69.4 kg (153 lb)   Height: 156.2 cm (61.5\")   PainSc: 0-No pain     Body mass index is 28.44 kg/m².  Vitals and nursing note reviewed.   Constitutional:       Appearance: Well-developed.   HENT:      Head: Normocephalic and atraumatic.   Neck:      Thyroid: No thyromegaly.      Vascular: No JVD.   Pulmonary:      Effort: Pulmonary effort is normal. No respiratory distress.      Breath sounds: Normal breath sounds. No wheezing. No rales.   Cardiovascular:      Normal rate. Regular rhythm. Normal heart sounds.      No gallop. No friction rub.   Abdominal:      General: Bowel sounds are normal. There is no distension.      Palpations: Abdomen is soft.      Tenderness: There is no abdominal tenderness. There is no guarding or rebound.   Musculoskeletal:      Cervical back: Neck supple. Skin:     General: Skin is warm and dry.   Neurological:      Mental Status: Alert.   Psychiatric:         Behavior: Behavior normal.       Component      Latest Ref Rng & Units 3/15/2022 " 3/18/2022   Glucose      65 - 99 mg/dL 84    BUN      10 - 36 mg/dL 10    Creatinine      0.57 - 1.00 mg/dL 0.89    EGFR Result      >59 mL/min/1.73 62    BUN/Creatinine Ratio      12 - 28 11 (L)    Sodium      134 - 144 mmol/L 136    Potassium      3.5 - 5.2 mmol/L 3.8    Chloride      96 - 106 mmol/L 98    CO2      20 - 29 mmol/L 24    Calcium      8.7 - 10.3 mg/dL 9.2    Total Protein      6.0 - 8.5 g/dL 7.2    Albumin      3.5 - 4.6 g/dL 4.3    Globulin      1.5 - 4.5 g/dL 2.9    A/G Ratio      1.2 - 2.2 1.5    Total Bilirubin      0.0 - 1.2 mg/dL 0.5    Alkaline Phosphatase      44 - 121 IU/L 78    AST (SGOT)      0 - 40 IU/L 23    ALT (SGPT)      0 - 32 IU/L 20    WBC      3.4 - 10.8 x10E3/uL 9.4    RBC      3.77 - 5.28 x10E6/uL 4.68    Hemoglobin      11.1 - 15.9 g/dL 13.5    Hematocrit      34.0 - 46.6 % 41.1    MCV      79 - 97 fL 88    MCH      26.6 - 33.0 pg 28.8    MCHC      31.5 - 35.7 g/dL 32.8    RDW      11.7 - 15.4 % 12.8    Platelets      150 - 450 x10E3/uL 221    Fecal Occult Blood      Negative  Positive (A)   Lot Number        [id number]   Expiration Date        1,312,023   DEVELOPER LOT NUMBER        [id number]   DEVELOPER EXPIRATION DATE        9,302,023   Positive Control      Positive  Positive   Negative Control      Negative  Negative   Total Cholesterol      100 - 199 mg/dL 162    Triglycerides      0 - 149 mg/dL 167 (H)    HDL Cholesterol      >39 mg/dL 40    VLDL Cholesterol Simba      5 - 40 mg/dL 29    LDL Cholesterol       0 - 99 mg/dL 93    25 Hydroxy, Vitamin D      30.0 - 100.0 ng/mL 28.1 (L)    Vitamin B-12      232 - 1,245 pg/mL 1,125      Component      Latest Ref Rng & Units 11/9/2021   Total Cholesterol      100 - 199 mg/dL 151   Triglycerides      0 - 149 mg/dL 205 (H)   HDL Cholesterol      >39 mg/dL 36 (L)   VLDL Cholesterol Simba      5 - 40 mg/dL 35   LDL Cholesterol       0 - 99 mg/dL 80   TSH Baseline      0.450 - 4.500 uIU/mL 0.919     Diagnoses and all orders for this  visit:    1. Medicare annual wellness visit, subsequent (Primary)    2. Essential hypertension    3. Chronic constipation    4. Other hyperlipidemia    5. Other specified hypothyroidism    6. Cobalamin deficiency    7. Vitamin D deficiency    8. Panic attack  -     LORazepam (Ativan) 0.5 MG tablet; 1/2 po daily prn panic attack, use sparingly  Dispense: 30 tablet; Refill: 1    9. Overweight (BMI 25.0-29.9)    wants ativan for panic attacks, used sparingly in past;  I discusswed serious risks of falls, injuries and memory loss;  Use cautiously  -hypertension - controlled, continue medications  -HLD - continue statin, recheck lipids next ov  -hypothyroidism - continue medication, recheck thyroid labs at next ov  -b12 ok, recheck blood counts next ov  -saw GI no c-scope as 1 episode of bleeding and age;  considr if recurs or anemia;  Check cbc next ov  -vitamin D normalized  -constipation - ok mom when needs;  Miralax would daily    Return in about 6 months (around 9/30/2022), or if symptoms worsen or fail to improve.

## 2022-03-31 NOTE — TELEPHONE ENCOUNTER
SHE STATED SHE WAS IN HERE YESTERDAY AND SEEN DR. WATERMAN. HE GAVE HER A SCRIPT AND SHE IS DOING FINE AND NO BLEEDING.

## 2022-04-04 ENCOUNTER — TELEPHONE (OUTPATIENT)
Dept: FAMILY MEDICINE CLINIC | Facility: CLINIC | Age: 87
End: 2022-04-04

## 2022-04-04 RX ORDER — FAMOTIDINE 40 MG/1
40 TABLET, FILM COATED ORAL DAILY
Qty: 30 TABLET | Refills: 5 | Status: SHIPPED | OUTPATIENT
Start: 2022-04-04 | End: 2023-03-01

## 2022-04-04 NOTE — TELEPHONE ENCOUNTER
Pt asked if you will send in something for reflux. She also asked if there is a specific diet she should be following for her diverticulitis? She bought chocolate flavored Boost yesterday and wanted to know if this will help or aggravate her reflux?

## 2022-04-04 NOTE — TELEPHONE ENCOUNTER
I sent in famotidine (pepcid) for reflux to [facility].  I would increase fiber in diet (vegetables and fruit) for diverticulosis.  Chocolate can make reflux worse, but the boost likely ok.  RRJ

## 2022-04-20 ENCOUNTER — OFFICE VISIT (OUTPATIENT)
Dept: FAMILY MEDICINE CLINIC | Facility: CLINIC | Age: 87
End: 2022-04-20

## 2022-04-20 VITALS
HEART RATE: 84 BPM | WEIGHT: 151 LBS | HEIGHT: 61 IN | OXYGEN SATURATION: 98 % | RESPIRATION RATE: 20 BRPM | TEMPERATURE: 97.7 F | SYSTOLIC BLOOD PRESSURE: 126 MMHG | DIASTOLIC BLOOD PRESSURE: 70 MMHG | BODY MASS INDEX: 28.51 KG/M2

## 2022-04-20 DIAGNOSIS — K57.30 DIVERTICULOSIS LARGE INTESTINE W/O PERFORATION OR ABSCESS W/O BLEEDING: Primary | ICD-10-CM

## 2022-04-20 DIAGNOSIS — K59.09 CHRONIC CONSTIPATION: ICD-10-CM

## 2022-04-20 DIAGNOSIS — F41.9 ANXIETY: ICD-10-CM

## 2022-04-20 DIAGNOSIS — K21.9 GASTROESOPHAGEAL REFLUX DISEASE, UNSPECIFIED WHETHER ESOPHAGITIS PRESENT: ICD-10-CM

## 2022-04-20 PROCEDURE — 99214 OFFICE O/P EST MOD 30 MIN: CPT | Performed by: NURSE PRACTITIONER

## 2022-04-20 RX ORDER — ESOMEPRAZOLE MAGNESIUM 40 MG/1
40 CAPSULE, DELAYED RELEASE ORAL
Qty: 90 CAPSULE | Refills: 1 | Status: SHIPPED | OUTPATIENT
Start: 2022-04-20 | End: 2022-05-17 | Stop reason: SDUPTHER

## 2022-04-20 NOTE — PROGRESS NOTES
"Subjective   Kelvin Restrepo is a 89 y.o. female.   Abdominal Pain C/o lt sided abd pain belching bloating hx Diverticulosis      History of Present Illness   Has bloating after eating too much. Started 2-3 yrs ago, muscle cramp like with BM L mid abdomen, gradually increased cramping even up to LUQ over the years. Massage would help. Can't stand to be restricted across abdomen when bloated. Has had constipation for most of life. Takes lax caps a [employer]ak[employer] product at least 2 x week. Since saw Maribel Using montes MOM and works well.   Reflux more often. Used to be infrequent. nexium previously helpful.   Has been taking famotidine for years since allergist told her to take, so did not start recently prescribed famotidine  Mother  with diverticulitis.   Brings me some handouts to review. Reviews diet advancement after a flare and fiber intake. Has not tried increasing fiber and water intake. Worried as abdomen not symmetric when stands  The following portions of the patient's history were reviewed and updated as appropriate: allergies, current medications, past family history, past medical history, past social history, past surgical history and problem list.    Review of Systems   Constitutional: Positive for appetite change and fatigue. Negative for activity change, unexpected weight gain and unexpected weight loss.   Gastrointestinal: Positive for abdominal distention, abdominal pain, constipation, GERD and indigestion. Negative for diarrhea, nausea and vomiting.   Neurological: Negative for light-headedness.   Psychiatric/Behavioral: The patient is nervous/anxious.      /70 (BP Location: Left arm, Patient Position: Sitting, Cuff Size: Adult)   Pulse 84   Temp 97.7 °F (36.5 °C) (Temporal)   Resp 20   Ht 156.2 cm (61.5\")   Wt 68.5 kg (151 lb)   SpO2 98%   BMI 28.07 kg/m²     Objective   Physical Exam  Vitals and nursing note reviewed.   Abdominal:      General: Bowel sounds are decreased.      " Palpations: Abdomen is soft.      Tenderness: There is no right CVA tenderness, left CVA tenderness, guarding or rebound. Negative signs include Randhawa's sign, McBurney's sign and obturator sign.      Hernia: No hernia is present.          Comments: Minimal tenderness to deep palpation only           Assessment/Plan   Problems Addressed this Visit        Gastrointestinal Abdominal     Chronic constipation    Gastroesophageal reflux disease    Relevant Medications    esomeprazole (nexIUM) 40 MG capsule       Mental Health    Anxiety      Other Visit Diagnoses     Diverticulosis large intestine w/o perforation or abscess w/o bleeding    -  Primary      Diagnoses       Codes Comments    Diverticulosis large intestine w/o perforation or abscess w/o bleeding    -  Primary ICD-10-CM: K57.30  ICD-9-CM: 562.10     Gastroesophageal reflux disease, unspecified whether esophagitis present     ICD-10-CM: K21.9  ICD-9-CM: 530.81     Anxiety     ICD-10-CM: F41.9  ICD-9-CM: 300.00     Chronic constipation     ICD-10-CM: K59.09  ICD-9-CM: 564.00         Diverticulosis--discussed warning signs for more acute including, fever, severe pain, mucus and blood in the stool (not around) discussed increased fiber intake best preventative for flare up. Will start benefiber daily. Ok +/- MOM for chronic constipation.   Anxiety is increased due to FU diverticulitis, clearly present today. 4th visit since 3/15/2022 for this problem (1 was GI consult)    CT abd pelvis 3/15/2022 with diverticulosis and pelvic floor relaxation    I personally spent 33 minutes reviewing the chart before the visit, time with the patient, and time documenting the visit.

## 2022-05-04 ENCOUNTER — TELEPHONE (OUTPATIENT)
Dept: FAMILY MEDICINE CLINIC | Facility: CLINIC | Age: 87
End: 2022-05-04

## 2022-05-04 NOTE — TELEPHONE ENCOUNTER
Caller: Kelvin Restrepo    Relationship: Self    Best call back number: [phone]    What was the call regarding: PATIENT IS CHECKING TO SEE IF THE FORM SHE LEFT FOR HER MEDICATION ON Friday WAS FAXED TO [facility]. PLEASE ADVISE BEFORE SHE DRIVES DOWN TO TO GET HER MEDICATION.     Do you require a callback: YES

## 2022-05-11 ENCOUNTER — DOCUMENTATION (OUTPATIENT)
Dept: FAMILY MEDICINE CLINIC | Facility: CLINIC | Age: 87
End: 2022-05-11

## 2022-05-17 DIAGNOSIS — K21.9 GASTROESOPHAGEAL REFLUX DISEASE, UNSPECIFIED WHETHER ESOPHAGITIS PRESENT: Primary | ICD-10-CM

## 2022-05-17 RX ORDER — ESOMEPRAZOLE MAGNESIUM 40 MG/1
40 CAPSULE, DELAYED RELEASE ORAL
Qty: 90 CAPSULE | Refills: 1 | Status: SHIPPED | OUTPATIENT
Start: 2022-05-17 | End: 2022-05-18 | Stop reason: SDUPTHER

## 2022-05-17 RX ORDER — ESOMEPRAZOLE MAGNESIUM 40 MG/1
40 CAPSULE, DELAYED RELEASE ORAL
Qty: 90 CAPSULE | Refills: 1 | Status: SHIPPED | OUTPATIENT
Start: 2022-05-17 | End: 2022-05-17

## 2022-05-18 DIAGNOSIS — K21.9 GASTROESOPHAGEAL REFLUX DISEASE, UNSPECIFIED WHETHER ESOPHAGITIS PRESENT: ICD-10-CM

## 2022-05-18 RX ORDER — ESOMEPRAZOLE MAGNESIUM 40 MG/1
40 CAPSULE, DELAYED RELEASE ORAL
Qty: 90 CAPSULE | Refills: 3 | Status: SHIPPED | OUTPATIENT
Start: 2022-05-18 | End: 2022-06-13 | Stop reason: SDUPTHER

## 2022-06-13 ENCOUNTER — OFFICE VISIT (OUTPATIENT)
Dept: FAMILY MEDICINE CLINIC | Facility: CLINIC | Age: 87
End: 2022-06-13

## 2022-06-13 VITALS
BODY MASS INDEX: 28.89 KG/M2 | DIASTOLIC BLOOD PRESSURE: 80 MMHG | WEIGHT: 153 LBS | HEART RATE: 85 BPM | HEIGHT: 61 IN | SYSTOLIC BLOOD PRESSURE: 138 MMHG | OXYGEN SATURATION: 96 % | RESPIRATION RATE: 20 BRPM

## 2022-06-13 DIAGNOSIS — I10 ESSENTIAL HYPERTENSION: Primary | ICD-10-CM

## 2022-06-13 DIAGNOSIS — E03.8 OTHER SPECIFIED HYPOTHYROIDISM: ICD-10-CM

## 2022-06-13 DIAGNOSIS — F32.1 CURRENT MODERATE EPISODE OF MAJOR DEPRESSIVE DISORDER WITHOUT PRIOR EPISODE: ICD-10-CM

## 2022-06-13 DIAGNOSIS — E55.9 VITAMIN D DEFICIENCY: ICD-10-CM

## 2022-06-13 DIAGNOSIS — E78.49 OTHER HYPERLIPIDEMIA: ICD-10-CM

## 2022-06-13 DIAGNOSIS — H61.23 CERUMEN DEBRIS ON TYMPANIC MEMBRANE OF BOTH EARS: ICD-10-CM

## 2022-06-13 DIAGNOSIS — M79.10 MYALGIA: ICD-10-CM

## 2022-06-13 DIAGNOSIS — R53.83 OTHER FATIGUE: ICD-10-CM

## 2022-06-13 PROCEDURE — 99214 OFFICE O/P EST MOD 30 MIN: CPT | Performed by: FAMILY MEDICINE

## 2022-06-13 RX ORDER — LISINOPRIL 10 MG/1
10 TABLET ORAL DAILY
Qty: 90 TABLET | Refills: 1 | Status: SHIPPED | OUTPATIENT
Start: 2022-06-13

## 2022-06-13 RX ORDER — ATORVASTATIN CALCIUM 10 MG/1
10 TABLET, FILM COATED ORAL DAILY
Qty: 90 TABLET | Refills: 1 | Status: SHIPPED | OUTPATIENT
Start: 2022-06-13 | End: 2022-07-25

## 2022-06-13 RX ORDER — CITALOPRAM 10 MG/1
TABLET ORAL
Qty: 90 TABLET | Refills: 3 | Status: SHIPPED | OUTPATIENT
Start: 2022-06-13 | End: 2022-10-25 | Stop reason: SDUPTHER

## 2022-06-13 RX ORDER — OMEPRAZOLE 20 MG/1
CAPSULE, DELAYED RELEASE ORAL
COMMUNITY
Start: 2022-05-18 | End: 2023-03-01 | Stop reason: SDUPTHER

## 2022-06-13 RX ORDER — LEVOTHYROXINE SODIUM 112 UG/1
112 TABLET ORAL DAILY
Qty: 90 TABLET | Refills: 1 | Status: SHIPPED | OUTPATIENT
Start: 2022-06-13

## 2022-06-13 NOTE — PROGRESS NOTES
Subjective   Kelvin Restrepo is a 89 y.o. female. Presents today for   Chief Complaint   Patient presents with   • Fatigue   • Hypertension   • Med Refill   • Heartburn       Fatigue  This is a chronic problem. The current episode started more than 1 month ago. Associated symptoms include fatigue and myalgias. Pertinent negatives include no chest pain. She has tried nothing for the symptoms.   Hypertension  This is a chronic problem. Associated symptoms include malaise/fatigue. Pertinent negatives include no chest pain, orthopnea, palpitations, peripheral edema, PND or shortness of breath. The current treatment provides moderate improvement.   Hyperlipidemia  This is a chronic problem. Exacerbating diseases include hypothyroidism. Associated symptoms include myalgias. Pertinent negatives include no chest pain or shortness of breath. Current antihyperlipidemic treatment includes statins. The current treatment provides moderate improvement of lipids.   Hypothyroidism  This is a chronic problem. The current episode started more than 1 year ago. The problem has been unchanged. Associated symptoms include fatigue and myalgias. Pertinent negatives include no chest pain. Treatments tried: due check.   Depression  Visit Type: initial  Onset of symptoms: 1 to 6 months ago  Progression since onset: gradually worsening  Patient presents with the following symptoms: depressed mood.  Patient is not experiencing: palpitations and shortness of breath.        Review of Systems   Constitutional: Positive for fatigue and malaise/fatigue.   Respiratory: Negative for shortness of breath.    Cardiovascular: Negative for chest pain, palpitations, orthopnea and PND.   Musculoskeletal: Positive for myalgias.       Patient Active Problem List   Diagnosis   • Suprapubic pain   • Allergic state   • Chronic constipation   • Gastroesophageal reflux disease   • Fatigue   • Hyperlipidemia   • Hypothyroidism   • Insomnia   • Obstructive sleep  apnea syndrome   • Osteoarthritis of multiple joints   • Osteoporosis   • Cobalamin deficiency   • Essential hypertension   • Anxiety   • H/O right mastectomy       Social History     Socioeconomic History   • Marital status:    Tobacco Use   • Smoking status: Never Smoker   • Smokeless tobacco: Never Used   Vaping Use   • Vaping Use: Never used   Substance and Sexual Activity   • Alcohol use: No   • Drug use: No   • Sexual activity: Not Currently     Partners: Male     Birth control/protection: Post-menopausal       Allergies   Allergen Reactions   • Amlodipine      Patient does not recall ever having a reaction to this medication    • Prednisone Hives       Current Outpatient Medications on File Prior to Visit   Medication Sig Dispense Refill   • aspirin 325 MG tablet Take 1 tablet by mouth Daily. PLEASE CALL THE OFFICE FOR AN APPT 15 tablet 0   • cetirizine (zyrTEC) 10 MG tablet Take 1 tablet by mouth Daily. 90 tablet 2   • clobetasol (TEMOVATE) 0.05 % ointment      • famotidine (PEPCID) 40 MG tablet Take 1 tablet by mouth Daily. 30 tablet 5   • fluticasone (Flonase) 50 MCG/ACT nasal spray 2 sprays into the nostril(s) as directed by provider Daily. 16 g 2   • LORazepam (Ativan) 0.5 MG tablet 1/2 po daily prn panic attack, use sparingly 30 tablet 1   • montelukast (SINGULAIR) 10 MG tablet Take 1 tablet by mouth Daily. 90 tablet 1   • Multiple Minerals-Vitamins (CALCIUM & VIT D3 BONE HEALTH PO) Take 1 tablet by mouth Daily.     • multivitamin with minerals tablet tablet Take 1 tablet by mouth Daily.     • omeprazole (priLOSEC) 20 MG capsule      • Probiotic Product (ALIGN PO) Take  by mouth.     • vitamin B-12 (CYANOCOBALAMIN) 1000 MCG tablet Take 1 tablet by mouth Daily. 90 tablet 3   • [DISCONTINUED] atorvastatin (LIPITOR) 10 MG tablet Take 1 tablet by mouth Daily. 90 tablet 1   • [DISCONTINUED] esomeprazole (nexIUM) 40 MG capsule Take 1 capsule by mouth Every Morning Before Breakfast. 90 capsule 3   •  "[DISCONTINUED] levothyroxine (SYNTHROID, LEVOTHROID) 112 MCG tablet Take 1 tablet by mouth Daily. 90 tablet 1   • [DISCONTINUED] lisinopril (PRINIVIL,ZESTRIL) 10 MG tablet Take 1 tablet by mouth Daily. 30 tablet 5     No current facility-administered medications on file prior to visit.       Objective   Vitals:    06/13/22 1435   BP: 138/80   BP Location: Left arm   Patient Position: Sitting   Cuff Size: Adult   Pulse: 85   Resp: 20   SpO2: 96%   Weight: 69.4 kg (153 lb)   Height: 156.2 cm (61.5\")   PainSc: 0-No pain     Body mass index is 28.44 kg/m².    Physical Exam  Vitals and nursing note reviewed.   Constitutional:       Appearance: Normal appearance. She is not toxic-appearing or diaphoretic.   HENT:      Head: Normocephalic and atraumatic.      Comments: Partial obstruction on right and some cerumen on left  Musculoskeletal:      Cervical back: Neck supple.   Skin:     General: Skin is warm and dry.      Capillary Refill: Capillary refill takes less than 2 seconds.   Neurological:      Mental Status: She is alert.   Psychiatric:         Mood and Affect: Mood normal.         Behavior: Behavior normal.      Status: Final result     Visible to patient: No (inaccessible in MyChart)     Next appt: 10/03/2022 at 01:00 PM in Family Medicine (Leoncio Stoll DO)     Dx: Other hyperlipidemia    Specimen Information: Blood         6 Result Notes     1 Patient Communication     1  Topic    Component   Ref Range & Units 3 mo ago   (3/15/22) 7 mo ago   (11/9/21) 1 yr ago   (11/10/20) 3 yr ago   (4/23/19) 3 yr ago   (10/23/18) 4 yr ago   (4/24/18) 6 yr ago   (3/14/16)   Total Cholesterol   100 - 199 mg/dL 162  151  169 R  160  136  159  168    Triglycerides   0 - 149 mg/dL 167 High   205 High   215 High  R  140  146  187 High   206 High     HDL Cholesterol   >39 mg/dL 40  36 Low   37 Low  R  38 Low   30 Low   37 Low   30 Low  CM    VLDL Cholesterol [place]   5 - 40 mg/dL 29  35  37        LDL Chol Calc ([facility])   0 - 99 " mg/dL 93  80  95 R  94                Assessment & Plan   Diagnoses and all orders for this visit:    1. Essential hypertension (Primary)  -     lisinopril (PRINIVIL,ZESTRIL) 10 MG tablet; Take 1 tablet by mouth Daily.  Dispense: 90 tablet; Refill: 1  -     Comprehensive Metabolic Panel    2. Other hyperlipidemia  -     atorvastatin (LIPITOR) 10 MG tablet; Take 1 tablet by mouth Daily.  Dispense: 90 tablet; Refill: 1  -     Comprehensive Metabolic Panel    3. Other fatigue  -     Comprehensive Metabolic Panel  -     CBC & Differential  -     TSH  -     Vitamin B12    4. Other specified hypothyroidism  -     levothyroxine (SYNTHROID, LEVOTHROID) 112 MCG tablet; Take 1 tablet by mouth Daily.  Dispense: 90 tablet; Refill: 1  -     TSH    5. Myalgia  -     CBC & Differential  -     Magnesium  -     CK    6. Vitamin D deficiency  -     Vitamin D 25 Hydroxy    7. Current moderate episode of major depressive disorder without prior episode (HCC)  -     citalopram (CeleXA) 10 MG tablet; 1 po daily  Dispense: 90 tablet; Refill: 3    8. Cerumen debris on tympanic membrane of both ears  -     carbamide peroxide (DEBROX) 6.5 % otic solution; Administer 5 drops into both ears 2 (Two) Times a Day.  Dispense: 22 mL; Refill: 12    -hypertension - controlled, continue medications  -HLD - continue statin, recheck lipids controlled;  However having myalgias and feels tired, will have hold statin for 3 weeks then call on how feeling; check labs as well for fatigue and myalgias  -depression - could be impacting energy level, will start celexa as had in past with great relief;  Check thyroid as well  -hypothyroidism - continue medication, recheck thyroid labs.  -has cerumen debris on exam, gave Rx for debrox as not completely obstructed           -Follow up: 6 weeks and prn

## 2022-06-14 LAB
25(OH)D3+25(OH)D2 SERPL-MCNC: 26.1 NG/ML (ref 30–100)
ALBUMIN SERPL-MCNC: 4.5 G/DL (ref 3.5–4.6)
ALBUMIN/GLOB SERPL: 1.7 {RATIO} (ref 1.2–2.2)
ALP SERPL-CCNC: 99 IU/L (ref 44–121)
ALT SERPL-CCNC: 21 IU/L (ref 0–32)
AST SERPL-CCNC: 24 IU/L (ref 0–40)
BASOPHILS # BLD AUTO: 0 X10E3/UL (ref 0–0.2)
BASOPHILS NFR BLD AUTO: 0 %
BILIRUB SERPL-MCNC: 0.5 MG/DL (ref 0–1.2)
BUN SERPL-MCNC: 18 MG/DL (ref 10–36)
BUN/CREAT SERPL: 21 (ref 12–28)
CALCIUM SERPL-MCNC: 9.9 MG/DL (ref 8.7–10.3)
CHLORIDE SERPL-SCNC: 99 MMOL/L (ref 96–106)
CK SERPL-CCNC: 103 U/L (ref 26–161)
CO2 SERPL-SCNC: 25 MMOL/L (ref 20–29)
CREAT SERPL-MCNC: 0.86 MG/DL (ref 0.57–1)
EGFRCR SERPLBLD CKD-EPI 2021: 64 ML/MIN/1.73
EOSINOPHIL # BLD AUTO: 0 X10E3/UL (ref 0–0.4)
EOSINOPHIL NFR BLD AUTO: 0 %
ERYTHROCYTE [DISTWIDTH] IN BLOOD BY AUTOMATED COUNT: 12.5 % (ref 11.7–15.4)
GLOBULIN SER CALC-MCNC: 2.7 G/DL (ref 1.5–4.5)
GLUCOSE SERPL-MCNC: 85 MG/DL (ref 65–99)
HCT VFR BLD AUTO: 42.2 % (ref 34–46.6)
HGB BLD-MCNC: 13.8 G/DL (ref 11.1–15.9)
IMM GRANULOCYTES # BLD AUTO: 0 X10E3/UL (ref 0–0.1)
IMM GRANULOCYTES NFR BLD AUTO: 0 %
LYMPHOCYTES # BLD AUTO: 1.4 X10E3/UL (ref 0.7–3.1)
LYMPHOCYTES NFR BLD AUTO: 19 %
MAGNESIUM SERPL-MCNC: 2.1 MG/DL (ref 1.6–2.3)
MCH RBC QN AUTO: 28.9 PG (ref 26.6–33)
MCHC RBC AUTO-ENTMCNC: 32.7 G/DL (ref 31.5–35.7)
MCV RBC AUTO: 89 FL (ref 79–97)
MONOCYTES # BLD AUTO: 0.9 X10E3/UL (ref 0.1–0.9)
MONOCYTES NFR BLD AUTO: 12 %
NEUTROPHILS # BLD AUTO: 4.8 X10E3/UL (ref 1.4–7)
NEUTROPHILS NFR BLD AUTO: 69 %
PLATELET # BLD AUTO: 202 X10E3/UL (ref 150–450)
POTASSIUM SERPL-SCNC: 4.9 MMOL/L (ref 3.5–5.2)
PROT SERPL-MCNC: 7.2 G/DL (ref 6–8.5)
RBC # BLD AUTO: 4.77 X10E6/UL (ref 3.77–5.28)
SODIUM SERPL-SCNC: 138 MMOL/L (ref 134–144)
TSH SERPL DL<=0.005 MIU/L-ACNC: 1.46 UIU/ML (ref 0.45–4.5)
VIT B12 SERPL-MCNC: 688 PG/ML (ref 232–1245)
WBC # BLD AUTO: 7.1 X10E3/UL (ref 3.4–10.8)

## 2022-06-19 NOTE — PROGRESS NOTES
Call results to patient.  Vitamin D is low - send vitamin D3 5000 iu po daily disp #90 3 ref  Blood sugar, kidney and liver function normal  Thyroid normal  B12 normal  Electrolytes normal

## 2022-06-20 DIAGNOSIS — E55.9 VITAMIN D DEFICIENCY: Primary | ICD-10-CM

## 2022-07-25 ENCOUNTER — OFFICE VISIT (OUTPATIENT)
Dept: FAMILY MEDICINE CLINIC | Facility: CLINIC | Age: 87
End: 2022-07-25

## 2022-07-25 VITALS
BODY MASS INDEX: 28.89 KG/M2 | OXYGEN SATURATION: 98 % | HEART RATE: 88 BPM | DIASTOLIC BLOOD PRESSURE: 65 MMHG | WEIGHT: 157 LBS | SYSTOLIC BLOOD PRESSURE: 120 MMHG | HEIGHT: 62 IN

## 2022-07-25 DIAGNOSIS — F32.5 MAJOR DEPRESSIVE DISORDER WITH SINGLE EPISODE, IN FULL REMISSION: Primary | ICD-10-CM

## 2022-07-25 DIAGNOSIS — L98.9 SKIN LESION: ICD-10-CM

## 2022-07-25 DIAGNOSIS — M79.10 MYALGIA: ICD-10-CM

## 2022-07-25 PROCEDURE — 99213 OFFICE O/P EST LOW 20 MIN: CPT | Performed by: FAMILY MEDICINE

## 2022-07-25 RX ORDER — CLOTRIMAZOLE AND BETAMETHASONE DIPROPIONATE 10; .64 MG/G; MG/G
1 CREAM TOPICAL 2 TIMES DAILY
Qty: 30 G | Refills: 1 | Status: SHIPPED | OUTPATIENT
Start: 2022-07-25

## 2022-07-25 NOTE — PROGRESS NOTES
Subjective   Kelvin Restrepo is a 89 y.o. female. Presents today for   Chief Complaint   Patient presents with   • Spasms   • Fatigue   • Depression       Fatigue  This is a chronic problem. The current episode started more than 1 year ago. Associated symptoms include fatigue. Treatments tried: on celexa and off statin much better.   Depression  Visit Type: follow-up  Patient is not experiencing: anhedonia, depressed mood and feelings of worthlessness.  Frequency of symptoms: occasionally   Sleep quality: fair  Nighttime awakenings: occasional  Compliance with medications:  %            Review of Systems   Constitutional: Positive for fatigue.       Patient Active Problem List   Diagnosis   • Suprapubic pain   • Allergic state   • Chronic constipation   • Gastroesophageal reflux disease   • Fatigue   • Hyperlipidemia   • Hypothyroidism   • Insomnia   • Obstructive sleep apnea syndrome   • Osteoarthritis of multiple joints   • Osteoporosis   • Cobalamin deficiency   • Essential hypertension   • Anxiety   • H/O right mastectomy       Social History     Socioeconomic History   • Marital status:    Tobacco Use   • Smoking status: Never Smoker   • Smokeless tobacco: Never Used   Vaping Use   • Vaping Use: Never used   Substance and Sexual Activity   • Alcohol use: No   • Drug use: No   • Sexual activity: Not Currently     Partners: Male     Birth control/protection: Post-menopausal       Allergies   Allergen Reactions   • Amlodipine      Patient does not recall ever having a reaction to this medication    • Prednisone Hives   • Atorvastatin Myalgia       Current Outpatient Medications on File Prior to Visit   Medication Sig Dispense Refill   • aspirin 325 MG tablet Take 1 tablet by mouth Daily. PLEASE CALL THE OFFICE FOR AN APPT 15 tablet 0   • carbamide peroxide (DEBROX) 6.5 % otic solution Administer 5 drops into both ears 2 (Two) Times a Day. 22 mL 12   • cetirizine (zyrTEC) 10 MG tablet Take 1 tablet by  "mouth Daily. 90 tablet 2   • citalopram (CeleXA) 10 MG tablet 1 po daily 90 tablet 3   • clobetasol (TEMOVATE) 0.05 % ointment      • famotidine (PEPCID) 40 MG tablet Take 1 tablet by mouth Daily. 30 tablet 5   • fluticasone (Flonase) 50 MCG/ACT nasal spray 2 sprays into the nostril(s) as directed by provider Daily. 16 g 2   • levothyroxine (SYNTHROID, LEVOTHROID) 112 MCG tablet Take 1 tablet by mouth Daily. 90 tablet 1   • lisinopril (PRINIVIL,ZESTRIL) 10 MG tablet Take 1 tablet by mouth Daily. 90 tablet 1   • LORazepam (Ativan) 0.5 MG tablet 1/2 po daily prn panic attack, use sparingly 30 tablet 1   • montelukast (SINGULAIR) 10 MG tablet Take 1 tablet by mouth Daily. 90 tablet 1   • Multiple Minerals-Vitamins (CALCIUM & VIT D3 BONE HEALTH PO) Take 1 tablet by mouth Daily.     • multivitamin with minerals tablet tablet Take 1 tablet by mouth Daily.     • omeprazole (priLOSEC) 20 MG capsule      • Probiotic Product (ALIGN PO) Take  by mouth.     • vitamin B-12 (CYANOCOBALAMIN) 1000 MCG tablet Take 1 tablet by mouth Daily. 90 tablet 3   • vitamin D3 125 MCG (5000 UT) capsule capsule Take 1 capsule by mouth Daily. 90 capsule 3   • [DISCONTINUED] atorvastatin (LIPITOR) 10 MG tablet Take 1 tablet by mouth Daily. 90 tablet 1     No current facility-administered medications on file prior to visit.       Objective   Vitals:    07/25/22 1339 07/25/22 1355   BP: 146/84 120/65   Pulse: 88    SpO2: 98%    Weight: 71.2 kg (157 lb)    Height: 156.2 cm (61.5\")      Body mass index is 29.18 kg/m².    Physical Exam  Vitals and nursing note reviewed.   Constitutional:       Appearance: Normal appearance. She is not toxic-appearing or diaphoretic.   HENT:      Head: Normocephalic and atraumatic.   Musculoskeletal:      Cervical back: Neck supple.   Skin:     General: Skin is warm and dry.      Capillary Refill: Capillary refill takes less than 2 seconds.      Comments: Red scaly rash left wrist   Neurological:      Mental Status: She " is alert.   Psychiatric:         Mood and Affect: Mood normal.         Behavior: Behavior normal.         Assessment & Plan   Diagnoses and all orders for this visit:    1. Major depressive disorder with single episode, in full remission (HCC) (Primary)    2. Myalgia    3. Skin lesion    Other orders  -     clotrimazole-betamethasone (Lotrisone) 1-0.05 % cream; Apply 1 application topically to the appropriate area as directed 2 (Two) Times a Day. Left wrist  Dispense: 30 g; Refill: 1    continue citalopram  Stay off statin         -Follow up: 3 months and prn

## 2022-09-19 ENCOUNTER — TELEPHONE (OUTPATIENT)
Dept: FAMILY MEDICINE CLINIC | Facility: CLINIC | Age: 87
End: 2022-09-19

## 2022-09-19 NOTE — TELEPHONE ENCOUNTER
Caller: Kelvin Restrepo    Relationship: Self    Best call back number: [phone]    Who are you requesting to speak with (clinical staff, provider,  specific staff member): DR WATERMAN     What was the call regarding: PATIENT IS CONCERNED THAT HER ISSUE MAY BE SERIOUS. PATIENT THINKS SHE MAY HAVE DIVERTICULITIS. PATIENT STATED THAT SHE HAS PAIN IN HER SIDE AND HER STOMACH AREA. PATIENT STATED SHE WEARS PANTYHOSE AND IT IRRITATES HER AND MAKES IT STING. PATIENT STATED THAT IT SCARES HER BECAUSE SHE HAD A SISTER IN LAW AND HER MOTHER THAT PASSED FROM SOMETHING LIKE THIS. PATIENT WANTS TO KNOW IF THERE IS ANYWAY SHE CAN GET IN SOONER. PATIENT TOOK THE FIRST AVAILABLE WITH [facility] FOR 09/20/2022 BUT IS CONCERNED ABOUT WAITING THAT LONG. PATIENT ALSO WANTED TO KNOW IF SHE CAN GET A PRESCRIPTION FOR SUPPORT HOSE. PLEASE ADVISE.      Do you require a callback: YES

## 2022-09-21 ENCOUNTER — OFFICE VISIT (OUTPATIENT)
Dept: FAMILY MEDICINE CLINIC | Facility: CLINIC | Age: 87
End: 2022-09-21

## 2022-09-21 ENCOUNTER — TELEPHONE (OUTPATIENT)
Dept: FAMILY MEDICINE CLINIC | Facility: CLINIC | Age: 87
End: 2022-09-21

## 2022-09-21 VITALS
WEIGHT: 153 LBS | SYSTOLIC BLOOD PRESSURE: 150 MMHG | HEIGHT: 62 IN | OXYGEN SATURATION: 92 % | HEART RATE: 102 BPM | DIASTOLIC BLOOD PRESSURE: 88 MMHG | BODY MASS INDEX: 28.16 KG/M2

## 2022-09-21 DIAGNOSIS — I83.813 VARICOSE VEINS OF BOTH LOWER EXTREMITIES WITH PAIN: ICD-10-CM

## 2022-09-21 DIAGNOSIS — R10.9 FLANK PAIN: ICD-10-CM

## 2022-09-21 DIAGNOSIS — R26.9 ABNORMAL GAIT: ICD-10-CM

## 2022-09-21 DIAGNOSIS — N10 ACUTE PYELONEPHRITIS: ICD-10-CM

## 2022-09-21 DIAGNOSIS — F41.0 PANIC ATTACK: ICD-10-CM

## 2022-09-21 DIAGNOSIS — M15.9 PRIMARY OSTEOARTHRITIS INVOLVING MULTIPLE JOINTS: ICD-10-CM

## 2022-09-21 DIAGNOSIS — R10.2 SUPRAPUBIC ABDOMINAL PAIN: Primary | ICD-10-CM

## 2022-09-21 LAB
BILIRUB BLD-MCNC: ABNORMAL MG/DL
CLARITY, POC: CLEAR
COLOR UR: YELLOW
GLUCOSE UR STRIP-MCNC: NEGATIVE MG/DL
KETONES UR QL: NEGATIVE
LEUKOCYTE EST, POC: ABNORMAL
NITRITE UR-MCNC: NEGATIVE MG/ML
PH UR: 5 [PH] (ref 5–8)
PROT UR STRIP-MCNC: NEGATIVE MG/DL
RBC # UR STRIP: NEGATIVE /UL
SP GR UR: 1.02 (ref 1–1.03)
UROBILINOGEN UR QL: NORMAL

## 2022-09-21 PROCEDURE — 81002 URINALYSIS NONAUTO W/O SCOPE: CPT | Performed by: FAMILY MEDICINE

## 2022-09-21 PROCEDURE — 99214 OFFICE O/P EST MOD 30 MIN: CPT | Performed by: FAMILY MEDICINE

## 2022-09-21 RX ORDER — AMOXICILLIN AND CLAVULANATE POTASSIUM 875; 125 MG/1; MG/1
1 TABLET, FILM COATED ORAL 2 TIMES DAILY
Qty: 20 TABLET | Refills: 0 | Status: SHIPPED | OUTPATIENT
Start: 2022-09-21 | End: 2022-10-10

## 2022-09-21 RX ORDER — ONDANSETRON 4 MG/1
4 TABLET, ORALLY DISINTEGRATING ORAL EVERY 8 HOURS PRN
Qty: 24 TABLET | Refills: 0 | Status: SHIPPED | OUTPATIENT
Start: 2022-09-21

## 2022-09-21 RX ORDER — LORAZEPAM 0.5 MG/1
TABLET ORAL
Qty: 30 TABLET | Refills: 1 | Status: SHIPPED | OUTPATIENT
Start: 2022-09-21 | End: 2022-10-10 | Stop reason: SDUPTHER

## 2022-09-21 NOTE — TELEPHONE ENCOUNTER
CT abd pelvis was negative for kidney stones and diverticulitis.  Looks like has bladder infection on CT.  Take antibiotics as planned.  RRJ

## 2022-09-21 NOTE — PROGRESS NOTES
Subjective   Kelvin Restrepo is a 89 y.o. female. Presents today for   Chief Complaint   Patient presents with   • Depression   • Abdominal Pain       Abdominal Pain  This is a new problem. The onset quality is sudden. The problem occurs intermittently. The problem has been waxing and waning. The pain is located in the LLQ and left flank. Pain severity now: Patient poor historian;  States severe feeling, cannot buttom pants but then denies pain;  Unable to define what feeling. The quality of the pain is aching and cramping. Pertinent negatives include no constipation, dysuria, hematochezia, hematuria, melena, nausea or vomiting. Associated symptoms comments: Had BM diarrhea x1 this am, then harder x 2 to 3.    No urinary frequency;   NO burning;        Some heartburn;   Has pants undone as cannot button per patient as bloated/distended.     Patient pain left flank;   Asked about cva but reports pain there all time, vague on new pain or not;  No hx of kidney stones.  . The pain is aggravated by certain positions and movement. She has tried nothing for the symptoms. The treatment provided no relief. There is no history of colon cancer or Crohn's disease.     No prior history of diverticulitis, but she is concerned has this as 2 family members  of this.      Varicose veins bothering her as well, would like compression hose.      Patient also issues with gait and has to stop frequently;  Afraid will fall and would like walker with seat.   Review of Systems   Gastrointestinal: Positive for abdominal pain. Negative for constipation, hematochezia, melena, nausea and vomiting.   Genitourinary: Negative for dysuria and hematuria.     Past Surgical History:   Procedure Laterality Date   • BLADDER REPAIR  2001   • COLONOSCOPY  2007   • HYSTERECTOMY     • MASTECTOMY Right 1996   • RECTAL FISSURE INCISION AND DRAINAGE  1975       Patient Active Problem List   Diagnosis   • Suprapubic pain   •  Allergic state   • Chronic constipation   • Gastroesophageal reflux disease   • Fatigue   • Hyperlipidemia   • Hypothyroidism   • Insomnia   • Obstructive sleep apnea syndrome   • Osteoarthritis of multiple joints   • Osteoporosis   • Cobalamin deficiency   • Essential hypertension   • Anxiety   • H/O right mastectomy       Social History     Socioeconomic History   • Marital status:    Tobacco Use   • Smoking status: Never Smoker   • Smokeless tobacco: Never Used   Vaping Use   • Vaping Use: Never used   Substance and Sexual Activity   • Alcohol use: No   • Drug use: No   • Sexual activity: Not Currently     Partners: Male     Birth control/protection: Post-menopausal       Allergies   Allergen Reactions   • Amlodipine      Patient does not recall ever having a reaction to this medication    • Prednisone Hives   • Atorvastatin Myalgia       Current Outpatient Medications on File Prior to Visit   Medication Sig Dispense Refill   • aspirin 325 MG tablet Take 1 tablet by mouth Daily. PLEASE CALL THE OFFICE FOR AN APPT 15 tablet 0   • carbamide peroxide (DEBROX) 6.5 % otic solution Administer 5 drops into both ears 2 (Two) Times a Day. 22 mL 12   • cetirizine (zyrTEC) 10 MG tablet Take 1 tablet by mouth Daily. 90 tablet 2   • citalopram (CeleXA) 10 MG tablet 1 po daily 90 tablet 3   • clobetasol (TEMOVATE) 0.05 % ointment      • clotrimazole-betamethasone (Lotrisone) 1-0.05 % cream Apply 1 application topically to the appropriate area as directed 2 (Two) Times a Day. Left wrist 30 g 1   • famotidine (PEPCID) 40 MG tablet Take 1 tablet by mouth Daily. 30 tablet 5   • fluticasone (Flonase) 50 MCG/ACT nasal spray 2 sprays into the nostril(s) as directed by provider Daily. 16 g 2   • levothyroxine (SYNTHROID, LEVOTHROID) 112 MCG tablet Take 1 tablet by mouth Daily. 90 tablet 1   • lisinopril (PRINIVIL,ZESTRIL) 10 MG tablet Take 1 tablet by mouth Daily. 90 tablet 1   • LORazepam (Ativan) 0.5 MG tablet 1/2 po daily  "prn panic attack, use sparingly 30 tablet 1   • montelukast (SINGULAIR) 10 MG tablet Take 1 tablet by mouth Daily. 90 tablet 1   • Multiple Minerals-Vitamins (CALCIUM & VIT D3 BONE HEALTH PO) Take 1 tablet by mouth Daily.     • multivitamin with minerals tablet tablet Take 1 tablet by mouth Daily.     • omeprazole (priLOSEC) 20 MG capsule      • Probiotic Product (ALIGN PO) Take  by mouth.     • vitamin B-12 (CYANOCOBALAMIN) 1000 MCG tablet Take 1 tablet by mouth Daily. 90 tablet 3   • vitamin D3 125 MCG (5000 UT) capsule capsule Take 1 capsule by mouth Daily. 90 capsule 3     No current facility-administered medications on file prior to visit.       Objective   Vitals:    09/21/22 1205   BP: 150/88   Pulse: 102   SpO2: 92%   Weight: 69.4 kg (153 lb)   Height: 156.2 cm (61.5\")     Body mass index is 28.44 kg/m².    Physical Exam  Vitals and nursing note reviewed.   Constitutional:       Appearance: She is well-developed.   HENT:      Head: Normocephalic and atraumatic.   Neck:      Thyroid: No thyromegaly.      Vascular: No JVD.   Cardiovascular:      Rate and Rhythm: Normal rate and regular rhythm.      Heart sounds: Normal heart sounds. No murmur heard.    No friction rub. No gallop.      Comments: Varicose veins b/l legs  Pulmonary:      Effort: Pulmonary effort is normal. No respiratory distress.      Breath sounds: Normal breath sounds. No wheezing or rales.   Abdominal:      General: Bowel sounds are normal. There is no distension.      Palpations: Abdomen is soft.      Tenderness: There is generalized abdominal tenderness and tenderness in the suprapubic area. There is left CVA tenderness. There is no guarding or rebound.      Comments: Has mild generalized pain, however point tenderness over suprapubic.     Musculoskeletal:      Cervical back: Neck supple.   Skin:     General: Skin is warm and dry.   Neurological:      Mental Status: She is alert.   Psychiatric:         Behavior: Behavior normal.         U/A " +leuk; negative otherwise.     Assessment & Plan   Diagnoses and all orders for this visit:    1. Suprapubic abdominal pain (Primary)  -     POC Urinalysis Dipstick  -     CT Abdomen Pelvis Stone Protocol; Future  -     CBC & Differential  -     Comprehensive Metabolic Panel  -     Lipase  -     Amylase  -     Urine Culture - , Urine, Clean Catch    2. Flank pain  -     CT Abdomen Pelvis Stone Protocol; Future  -     CBC & Differential  -     Comprehensive Metabolic Panel  -     Lipase  -     Amylase  -     Urine Culture - , Urine, Clean Catch    3. Acute pyelonephritis  -     CT Abdomen Pelvis Stone Protocol; Future  -     CBC & Differential  -     Comprehensive Metabolic Panel  -     Lipase  -     Amylase  -     Urine Culture - , Urine, Clean Catch    diff dx - acute pyelo, acute diverticulitis, renal stone colic;   Will order Stat CT, hold off IV contrast to look for stone;  She is moving frequently in office make me think renal colic though urine was negative blood (only couple drops, will need additional for culture).  Will check blood work and start augmentin.      Varicose veins - would benefit from jobst stockings to help with compression.    Patient very high fall risk, abnormal gait and needs to stop frequently;  Will give rolling walker with seat attachment;  Cane would not be adequate but needs to stop at times and sit.   She is high fall risk and will reduce falls.         -Follow up: 1 week and prn

## 2022-09-23 ENCOUNTER — TELEPHONE (OUTPATIENT)
Dept: FAMILY MEDICINE CLINIC | Facility: CLINIC | Age: 87
End: 2022-09-23

## 2022-09-23 LAB
ALBUMIN SERPL-MCNC: 4.7 G/DL (ref 3.5–4.6)
ALBUMIN/GLOB SERPL: 1.6 {RATIO} (ref 1.2–2.2)
ALP SERPL-CCNC: 85 IU/L (ref 44–121)
ALT SERPL-CCNC: 18 IU/L (ref 0–32)
AMYLASE SERPL-CCNC: 99 U/L (ref 31–110)
AST SERPL-CCNC: 20 IU/L (ref 0–40)
BACTERIA UR CULT: NORMAL
BACTERIA UR CULT: NORMAL
BASOPHILS # BLD AUTO: 0 X10E3/UL (ref 0–0.2)
BASOPHILS NFR BLD AUTO: 0 %
BILIRUB SERPL-MCNC: 0.5 MG/DL (ref 0–1.2)
BUN SERPL-MCNC: 16 MG/DL (ref 10–36)
BUN/CREAT SERPL: 17 (ref 12–28)
CALCIUM SERPL-MCNC: 9.7 MG/DL (ref 8.7–10.3)
CHLORIDE SERPL-SCNC: 92 MMOL/L (ref 96–106)
CO2 SERPL-SCNC: 23 MMOL/L (ref 20–29)
CREAT SERPL-MCNC: 0.93 MG/DL (ref 0.57–1)
EGFRCR SERPLBLD CKD-EPI 2021: 58 ML/MIN/1.73
EOSINOPHIL # BLD AUTO: 0 X10E3/UL (ref 0–0.4)
EOSINOPHIL NFR BLD AUTO: 0 %
ERYTHROCYTE [DISTWIDTH] IN BLOOD BY AUTOMATED COUNT: 12.6 % (ref 11.7–15.4)
GLOBULIN SER CALC-MCNC: 2.9 G/DL (ref 1.5–4.5)
GLUCOSE SERPL-MCNC: 76 MG/DL (ref 65–99)
HCT VFR BLD AUTO: 43.2 % (ref 34–46.6)
HGB BLD-MCNC: 13.5 G/DL (ref 11.1–15.9)
IMM GRANULOCYTES # BLD AUTO: 0 X10E3/UL (ref 0–0.1)
IMM GRANULOCYTES NFR BLD AUTO: 1 %
LIPASE SERPL-CCNC: 50 U/L (ref 14–85)
LYMPHOCYTES # BLD AUTO: 1.2 X10E3/UL (ref 0.7–3.1)
LYMPHOCYTES NFR BLD AUTO: 14 %
MCH RBC QN AUTO: 28 PG (ref 26.6–33)
MCHC RBC AUTO-ENTMCNC: 31.3 G/DL (ref 31.5–35.7)
MCV RBC AUTO: 90 FL (ref 79–97)
MONOCYTES # BLD AUTO: 0.8 X10E3/UL (ref 0.1–0.9)
MONOCYTES NFR BLD AUTO: 10 %
NEUTROPHILS # BLD AUTO: 6.7 X10E3/UL (ref 1.4–7)
NEUTROPHILS NFR BLD AUTO: 75 %
PLATELET # BLD AUTO: 237 X10E3/UL (ref 150–450)
POTASSIUM SERPL-SCNC: 4.5 MMOL/L (ref 3.5–5.2)
PROT SERPL-MCNC: 7.6 G/DL (ref 6–8.5)
RBC # BLD AUTO: 4.82 X10E6/UL (ref 3.77–5.28)
SODIUM SERPL-SCNC: 133 MMOL/L (ref 134–144)
WBC # BLD AUTO: 8.8 X10E3/UL (ref 3.4–10.8)

## 2022-09-23 NOTE — TELEPHONE ENCOUNTER
Caller: Kelvin Sanchez    Relationship: Self    Best call back number:[phone]     What is the best time to reach you:ANY TIME    Who are you requesting to speak with (clinical staff, provider,  specific staff member): ROCKY    Do you know the name of the person who called: STEPHANIE SANCHEZ    What was the call regarding: HAS A QUESTION T ASK.    Do you require a callback: YES.    PLEASE ADVISE.

## 2022-09-27 ENCOUNTER — OFFICE VISIT (OUTPATIENT)
Dept: FAMILY MEDICINE CLINIC | Facility: CLINIC | Age: 87
End: 2022-09-27

## 2022-09-27 VITALS
WEIGHT: 158 LBS | RESPIRATION RATE: 18 BRPM | BODY MASS INDEX: 29.83 KG/M2 | OXYGEN SATURATION: 98 % | HEIGHT: 61 IN | HEART RATE: 87 BPM | SYSTOLIC BLOOD PRESSURE: 122 MMHG | TEMPERATURE: 96.9 F | DIASTOLIC BLOOD PRESSURE: 70 MMHG

## 2022-09-27 DIAGNOSIS — Z23 NEED FOR INFLUENZA VACCINATION: ICD-10-CM

## 2022-09-27 DIAGNOSIS — N30.00 ACUTE CYSTITIS WITHOUT HEMATURIA: Primary | ICD-10-CM

## 2022-09-27 PROCEDURE — G0008 ADMIN INFLUENZA VIRUS VAC: HCPCS | Performed by: FAMILY MEDICINE

## 2022-09-27 PROCEDURE — 90662 IIV NO PRSV INCREASED AG IM: CPT | Performed by: FAMILY MEDICINE

## 2022-09-27 PROCEDURE — 99213 OFFICE O/P EST LOW 20 MIN: CPT | Performed by: FAMILY MEDICINE

## 2022-09-27 NOTE — PROGRESS NOTES
Subjective   Kelvin Restrepo is a 89 y.o. female. Presents today for   Chief Complaint   Patient presents with   • Abdominal Pain   • Back Pain   • Urinary Tract Infection   • fup ct scan       History of Present Illness  Patient 89 y/o female presented last week with abd pain rad to back;  Was in severe pain;   Had CT scan noted thickened bladder, labs ok and urine culture fair amoutn normal linda;  Took augmenting short course as didn't finish, but feels much better; no futher abd pain;  Rare twinges of back pain;  No n/v;  No dysuria;   No cva tenderness now;  Due for flu injection.    Review of Systems    Patient Active Problem List   Diagnosis   • Suprapubic pain   • Allergic state   • Chronic constipation   • Gastroesophageal reflux disease   • Fatigue   • Hyperlipidemia   • Hypothyroidism   • Insomnia   • Obstructive sleep apnea syndrome   • Osteoarthritis of multiple joints   • Osteoporosis   • Cobalamin deficiency   • Essential hypertension   • Anxiety   • H/O right mastectomy       Social History     Socioeconomic History   • Marital status:    Tobacco Use   • Smoking status: Never Smoker   • Smokeless tobacco: Never Used   Vaping Use   • Vaping Use: Never used   Substance and Sexual Activity   • Alcohol use: No   • Drug use: No   • Sexual activity: Not Currently     Partners: Male     Birth control/protection: Post-menopausal       Allergies   Allergen Reactions   • Amlodipine      Patient does not recall ever having a reaction to this medication    • Prednisone Hives   • Atorvastatin Myalgia       Current Outpatient Medications on File Prior to Visit   Medication Sig Dispense Refill   • amoxicillin-clavulanate (Augmentin) 875-125 MG per tablet Take 1 tablet by mouth 2 (Two) Times a Day. 20 tablet 0   • aspirin 325 MG tablet Take 1 tablet by mouth Daily. PLEASE CALL THE OFFICE FOR AN APPT 15 tablet 0   • carbamide peroxide (DEBROX) 6.5 % otic solution Administer 5 drops into both ears 2 (Two) Times  "a Day. 22 mL 12   • cetirizine (zyrTEC) 10 MG tablet Take 1 tablet by mouth Daily. 90 tablet 2   • citalopram (CeleXA) 10 MG tablet 1 po daily 90 tablet 3   • clobetasol (TEMOVATE) 0.05 % ointment      • clotrimazole-betamethasone (Lotrisone) 1-0.05 % cream Apply 1 application topically to the appropriate area as directed 2 (Two) Times a Day. Left wrist 30 g 1   • famotidine (PEPCID) 40 MG tablet Take 1 tablet by mouth Daily. 30 tablet 5   • fluticasone (Flonase) 50 MCG/ACT nasal spray 2 sprays into the nostril(s) as directed by provider Daily. 16 g 2   • levothyroxine (SYNTHROID, LEVOTHROID) 112 MCG tablet Take 1 tablet by mouth Daily. 90 tablet 1   • lisinopril (PRINIVIL,ZESTRIL) 10 MG tablet Take 1 tablet by mouth Daily. 90 tablet 1   • LORazepam (Ativan) 0.5 MG tablet 1/2 po daily prn panic attack, use sparingly 30 tablet 1   • montelukast (SINGULAIR) 10 MG tablet Take 1 tablet by mouth Daily. 90 tablet 1   • Multiple Minerals-Vitamins (CALCIUM & VIT D3 BONE HEALTH PO) Take 1 tablet by mouth Daily.     • multivitamin with minerals tablet tablet Take 1 tablet by mouth Daily.     • omeprazole (priLOSEC) 20 MG capsule      • ondansetron ODT (ZOFRAN-ODT) 4 MG disintegrating tablet Place 1 tablet on the tongue Every 8 (Eight) Hours As Needed for Nausea or Vomiting. 24 tablet 0   • Probiotic Product (ALIGN PO) Take  by mouth.     • vitamin B-12 (CYANOCOBALAMIN) 1000 MCG tablet Take 1 tablet by mouth Daily. 90 tablet 3   • vitamin D3 125 MCG (5000 UT) capsule capsule Take 1 capsule by mouth Daily. 90 capsule 3     No current facility-administered medications on file prior to visit.       Objective   Vitals:    09/27/22 1442   BP: 122/70   Pulse: 87   Resp: 18   Temp: 96.9 °F (36.1 °C)   TempSrc: Temporal   SpO2: 98%   Weight: 71.7 kg (158 lb)   Height: 156.2 cm (61.5\")   PainSc: 0-No pain     Body mass index is 29.37 kg/m².    Physical Exam  Vitals and nursing note reviewed.   Constitutional:       Appearance: She is " well-developed.   HENT:      Head: Normocephalic and atraumatic.   Neck:      Thyroid: No thyromegaly.      Vascular: No JVD.   Cardiovascular:      Rate and Rhythm: Normal rate and regular rhythm.      Heart sounds: Normal heart sounds. No murmur heard.    No friction rub. No gallop.   Pulmonary:      Effort: Pulmonary effort is normal. No respiratory distress.      Breath sounds: Normal breath sounds. No wheezing or rales.   Abdominal:      General: Bowel sounds are normal. There is no distension.      Palpations: Abdomen is soft.      Tenderness: There is no abdominal tenderness. There is no guarding or rebound.   Musculoskeletal:      Cervical back: Neck supple.   Skin:     General: Skin is warm and dry.   Neurological:      Mental Status: She is alert.   Psychiatric:         Behavior: Behavior normal.         Component      Latest Ref Rng & Units 9/21/2022   WBC      3.4 - 10.8 x10E3/uL 8.8   RBC      3.77 - 5.28 x10E6/uL 4.82   Hemoglobin      11.1 - 15.9 g/dL 13.5   Hematocrit      34.0 - 46.6 % 43.2   MCV      79 - 97 fL 90   MCH      26.6 - 33.0 pg 28.0   MCHC      31.5 - 35.7 g/dL 31.3 (L)   RDW      11.7 - 15.4 % 12.6   Platelets      150 - 450 x10E3/uL 237   Neutrophil Rel %      Not Estab. % 75   Lymphocyte Rel %      Not Estab. % 14   Monocyte Rel %      Not Estab. % 10   Eosinophil Rel %      Not Estab. % 0   Basophil Rel %      Not Estab. % 0   Neutrophils Absolute      1.4 - 7.0 x10E3/uL 6.7   Lymphocytes Absolute      0.7 - 3.1 x10E3/uL 1.2   Monocytes Absolute      0.1 - 0.9 x10E3/uL 0.8   Eosinophils Absolute      0.0 - 0.4 x10E3/uL 0.0   Basophils Absolute      0.0 - 0.2 x10E3/uL 0.0   Immature Granulocyte Rel %      Not Estab. % 1   Immature Grans, Absolute      0.0 - 0.1 x10E3/uL 0.0   Glucose      65 - 99 mg/dL 76   BUN      10 - 36 mg/dL 16   Creatinine      0.57 - 1.00 mg/dL 0.93   EGFR Result      >59 mL/min/1.73 58 (L)   BUN/Creatinine Ratio      12 - 28 17   Sodium      134 - 144 mmol/L  133 (L)   Potassium      3.5 - 5.2 mmol/L 4.5   Chloride      96 - 106 mmol/L 92 (L)   CO2      20 - 29 mmol/L 23   Calcium      8.7 - 10.3 mg/dL 9.7   Total Protein      6.0 - 8.5 g/dL 7.6   Albumin      3.5 - 4.6 g/dL 4.7 (H)   Globulin      1.5 - 4.5 g/dL 2.9   A/G Ratio      1.2 - 2.2 1.6   Total Bilirubin      0.0 - 1.2 mg/dL 0.5   Alkaline Phosphatase      44 - 121 IU/L 85   AST (SGOT)      0 - 40 IU/L 20   ALT (SGPT)      0 - 32 IU/L 18   Lipase      14 - 85 U/L 50   Amylase      31 - 110 U/L 99     Assessment & Plan   Diagnoses and all orders for this visit:    1. Acute cystitis without hematuria (Primary)    2. Need for influenza vaccination  -     Fluzone High-Dose 65+yrs (3990-1136)    reviewed CT and labs with patient;  Resolved symptoms and on exam on further suprapubic pain;   Likely cleared;    Flu today         -Follow up: Prn - RTC if worse or no improvement.

## 2022-10-10 ENCOUNTER — OFFICE VISIT (OUTPATIENT)
Dept: FAMILY MEDICINE CLINIC | Facility: CLINIC | Age: 87
End: 2022-10-10

## 2022-10-10 VITALS
WEIGHT: 158 LBS | HEIGHT: 62 IN | DIASTOLIC BLOOD PRESSURE: 85 MMHG | BODY MASS INDEX: 29.08 KG/M2 | SYSTOLIC BLOOD PRESSURE: 140 MMHG | HEART RATE: 97 BPM | OXYGEN SATURATION: 97 %

## 2022-10-10 DIAGNOSIS — F41.0 PANIC ATTACK: ICD-10-CM

## 2022-10-10 DIAGNOSIS — K21.9 GASTROESOPHAGEAL REFLUX DISEASE, UNSPECIFIED WHETHER ESOPHAGITIS PRESENT: Primary | ICD-10-CM

## 2022-10-10 PROBLEM — J30.2 SEASONAL ALLERGIC RHINITIS: Status: ACTIVE | Noted: 2022-10-10

## 2022-10-10 PROBLEM — Z86.39 H/O HYPERLIPIDEMIA: Status: ACTIVE | Noted: 2022-10-10

## 2022-10-10 PROBLEM — Z86.39 HISTORY OF HYPOTHYROIDISM: Status: ACTIVE | Noted: 2022-10-10

## 2022-10-10 PROBLEM — Z85.3 HISTORY OF BREAST CANCER: Status: ACTIVE | Noted: 2022-10-10

## 2022-10-10 PROCEDURE — 99214 OFFICE O/P EST MOD 30 MIN: CPT | Performed by: FAMILY MEDICINE

## 2022-10-10 RX ORDER — SUCRALFATE 1 G/1
TABLET ORAL
Qty: 360 TABLET | Refills: 3 | Status: SHIPPED | OUTPATIENT
Start: 2022-10-10

## 2022-10-10 RX ORDER — SUCRALFATE 1 G/1
1 TABLET ORAL
COMMUNITY
Start: 2022-10-06 | End: 2022-10-10

## 2022-10-10 RX ORDER — LORAZEPAM 0.5 MG/1
TABLET ORAL
Qty: 30 TABLET | Refills: 1 | Status: SHIPPED | OUTPATIENT
Start: 2022-10-10 | End: 2022-10-10

## 2022-10-10 RX ORDER — KETOROLAC TROMETHAMINE 5 MG/ML
SOLUTION OPHTHALMIC
COMMUNITY
Start: 2022-09-28

## 2022-10-10 RX ORDER — LORAZEPAM 0.5 MG/1
TABLET ORAL
Qty: 90 TABLET | Refills: 0 | Status: SHIPPED | OUTPATIENT
Start: 2022-10-10

## 2022-10-10 NOTE — PROGRESS NOTES
Subjective   Kelvin Restrepo is a 89 y.o. female. Presents today for   Chief Complaint   Patient presents with   • Hospital Follow Up Visit     STME   • Abdominal Pain       History of Present Illness  Patient here for ED fu;  Had severe nausea (denies actual abd pain) and panic attack;  Took ativan, but still dyspnea and not feeling well with heart racing.  By time ED symptoms resolved;  Per notes stayed asymptomatic while in ED;  They did give med for bp and rx carafate;   She never started citolapram      Review of Systems   Constitutional: Negative for chills and fever.   Gastrointestinal: Positive for nausea and vomiting. Negative for abdominal pain.       Patient Active Problem List   Diagnosis   • Suprapubic pain   • Allergic state   • Chronic constipation   • Gastroesophageal reflux disease   • Fatigue   • Hyperlipidemia   • Hypothyroidism   • Insomnia   • Obstructive sleep apnea syndrome   • Osteoarthritis of multiple joints   • Osteoporosis   • Cobalamin deficiency   • Essential hypertension   • Anxiety   • H/O right mastectomy   • H/O hyperlipidemia   • History of breast cancer   • History of hypothyroidism   • Seasonal allergic rhinitis       Social History     Socioeconomic History   • Marital status:    Tobacco Use   • Smoking status: Never   • Smokeless tobacco: Never   Vaping Use   • Vaping Use: Never used   Substance and Sexual Activity   • Alcohol use: No   • Drug use: No   • Sexual activity: Not Currently     Partners: Male     Birth control/protection: Post-menopausal       Allergies   Allergen Reactions   • Amlodipine      Patient does not recall ever having a reaction to this medication    • Prednisone Hives   • Atorvastatin Myalgia       Current Outpatient Medications on File Prior to Visit   Medication Sig Dispense Refill   • aspirin 325 MG tablet Take 1 tablet by mouth Daily. PLEASE CALL THE OFFICE FOR AN APPT 15 tablet 0   • carbamide peroxide (DEBROX) 6.5 % otic solution Administer  5 drops into both ears 2 (Two) Times a Day. 22 mL 12   • cetirizine (zyrTEC) 10 MG tablet Take 1 tablet by mouth Daily. 90 tablet 2   • citalopram (CeleXA) 10 MG tablet 1 po daily 90 tablet 3   • clobetasol (TEMOVATE) 0.05 % ointment      • clotrimazole-betamethasone (Lotrisone) 1-0.05 % cream Apply 1 application topically to the appropriate area as directed 2 (Two) Times a Day. Left wrist 30 g 1   • famotidine (PEPCID) 40 MG tablet Take 1 tablet by mouth Daily. 30 tablet 5   • fluticasone (Flonase) 50 MCG/ACT nasal spray 2 sprays into the nostril(s) as directed by provider Daily. 16 g 2   • ketorolac (ACULAR) 0.5 % ophthalmic solution      • levothyroxine (SYNTHROID, LEVOTHROID) 112 MCG tablet Take 1 tablet by mouth Daily. 90 tablet 1   • lisinopril (PRINIVIL,ZESTRIL) 10 MG tablet Take 1 tablet by mouth Daily. 90 tablet 1   • montelukast (SINGULAIR) 10 MG tablet Take 1 tablet by mouth Daily. 90 tablet 1   • Multiple Minerals-Vitamins (CALCIUM & VIT D3 BONE HEALTH PO) Take 1 tablet by mouth Daily.     • multivitamin with minerals tablet tablet Take 1 tablet by mouth Daily.     • omeprazole (priLOSEC) 20 MG capsule      • ondansetron ODT (ZOFRAN-ODT) 4 MG disintegrating tablet Place 1 tablet on the tongue Every 8 (Eight) Hours As Needed for Nausea or Vomiting. 24 tablet 0   • Probiotic Product (ALIGN PO) Take  by mouth.     • vitamin B-12 (CYANOCOBALAMIN) 1000 MCG tablet Take 1 tablet by mouth Daily. 90 tablet 3   • vitamin D3 125 MCG (5000 UT) capsule capsule Take 1 capsule by mouth Daily. 90 capsule 3   • [DISCONTINUED] LORazepam (Ativan) 0.5 MG tablet 1/2 po daily prn panic attack, use sparingly 30 tablet 1   • [DISCONTINUED] sucralfate (CARAFATE) 1 g tablet 1 tablet.     • [DISCONTINUED] amoxicillin-clavulanate (Augmentin) 875-125 MG per tablet Take 1 tablet by mouth 2 (Two) Times a Day. 20 tablet 0     No current facility-administered medications on file prior to visit.       Objective   Vitals:    10/10/22 1352  "10/10/22 1510   BP: 142/86 140/85   Pulse: 97    SpO2: 97%    Weight: 71.7 kg (158 lb)    Height: 156.2 cm (61.5\")      Body mass index is 29.37 kg/m².    Physical Exam  Vitals and nursing note reviewed.   Constitutional:       Appearance: Normal appearance. She is not toxic-appearing or diaphoretic.   HENT:      Head: Normocephalic and atraumatic.   Musculoskeletal:      Cervical back: Neck supple.   Skin:     General: Skin is warm and dry.      Capillary Refill: Capillary refill takes less than 2 seconds.   Neurological:      Mental Status: She is alert.   Psychiatric:         Mood and Affect: Mood normal.         Behavior: Behavior normal.         Assessment & Plan   Diagnoses and all orders for this visit:    1. Gastroesophageal reflux disease, unspecified whether esophagitis present (Primary)  -     sucralfate (Carafate) 1 g tablet; 1 po 4x daily before meals and bedtime as needed reflux/nausea  Dispense: 360 tablet; Refill: 3    2. Panic attack  -     Discontinue: LORazepam (Ativan) 0.5 MG tablet; 1/2 po daily prn panic attack, use sparingly  Dispense: 30 tablet; Refill: 1  -     LORazepam (Ativan) 0.5 MG tablet; 1 po daily prn anxiety  Dispense: 90 tablet; Refill: 0    consider propranolol for bp and to help with anxiety symptoms  Ativan prn only for panic attack; warned of sedation, addictation potential and reviewed HIWOT  I would try the carafate for the stomach as having recurrent issues         -Follow up: 4 weeks and prn   "

## 2022-10-25 ENCOUNTER — TELEPHONE (OUTPATIENT)
Dept: FAMILY MEDICINE CLINIC | Facility: CLINIC | Age: 87
End: 2022-10-25

## 2022-10-25 ENCOUNTER — CLINICAL SUPPORT (OUTPATIENT)
Dept: FAMILY MEDICINE CLINIC | Facility: CLINIC | Age: 87
End: 2022-10-25

## 2022-10-25 DIAGNOSIS — N30.00 ACUTE CYSTITIS WITHOUT HEMATURIA: Primary | ICD-10-CM

## 2022-10-25 DIAGNOSIS — F32.1 CURRENT MODERATE EPISODE OF MAJOR DEPRESSIVE DISORDER WITHOUT PRIOR EPISODE: ICD-10-CM

## 2022-10-25 DIAGNOSIS — E53.8 B12 DEFICIENCY: ICD-10-CM

## 2022-10-25 PROCEDURE — 96372 THER/PROPH/DIAG INJ SC/IM: CPT | Performed by: FAMILY MEDICINE

## 2022-10-25 RX ORDER — CITALOPRAM 20 MG/1
TABLET ORAL
Qty: 90 TABLET | Refills: 1 | Status: SHIPPED | OUTPATIENT
Start: 2022-10-25

## 2022-10-25 RX ORDER — CYANOCOBALAMIN 1000 UG/ML
1000 INJECTION, SOLUTION INTRAMUSCULAR; SUBCUTANEOUS ONCE
Status: COMPLETED | OUTPATIENT
Start: 2022-10-25 | End: 2022-10-25

## 2022-10-25 RX ADMIN — CYANOCOBALAMIN 1000 MCG: 1000 INJECTION, SOLUTION INTRAMUSCULAR; SUBCUTANEOUS at 13:18

## 2022-10-30 LAB
APPEARANCE UR: CLEAR
BACTERIA #/AREA URNS HPF: NORMAL /[HPF]
BACTERIA UR CULT: NORMAL
BACTERIA UR CULT: NORMAL
BILIRUB UR QL STRIP: NEGATIVE
CASTS URNS QL MICRO: NORMAL /LPF
COLOR UR: YELLOW
EPI CELLS #/AREA URNS HPF: NORMAL /HPF (ref 0–10)
GLUCOSE UR QL STRIP: NEGATIVE
HGB UR QL STRIP: NEGATIVE
KETONES UR QL STRIP: NEGATIVE
LEUKOCYTE ESTERASE UR QL STRIP: ABNORMAL
MICRO URNS: ABNORMAL
NITRITE UR QL STRIP: NEGATIVE
PH UR STRIP: 6.5 [PH] (ref 5–7.5)
PROT UR QL STRIP: NEGATIVE
RBC #/AREA URNS HPF: NORMAL /HPF (ref 0–2)
SP GR UR STRIP: 1.01 (ref 1–1.03)
UROBILINOGEN UR STRIP-MCNC: 0.2 MG/DL (ref 0.2–1)
WBC #/AREA URNS HPF: NORMAL /HPF (ref 0–5)

## 2022-10-30 RX ORDER — CEPHALEXIN 500 MG/1
500 CAPSULE ORAL 2 TIMES DAILY
Qty: 14 CAPSULE | Refills: 0 | Status: SHIPPED | OUTPATIENT
Start: 2022-10-30 | End: 2023-01-20

## 2022-11-07 ENCOUNTER — OFFICE VISIT (OUTPATIENT)
Dept: FAMILY MEDICINE CLINIC | Facility: CLINIC | Age: 87
End: 2022-11-07

## 2022-11-07 VITALS
HEIGHT: 62 IN | DIASTOLIC BLOOD PRESSURE: 78 MMHG | WEIGHT: 157 LBS | SYSTOLIC BLOOD PRESSURE: 116 MMHG | BODY MASS INDEX: 28.89 KG/M2 | OXYGEN SATURATION: 94 % | HEART RATE: 85 BPM

## 2022-11-07 DIAGNOSIS — N39.0 ACUTE UTI: Primary | ICD-10-CM

## 2022-11-07 DIAGNOSIS — L90.0 LICHEN SCLEROSUS ET ATROPHICUS: ICD-10-CM

## 2022-11-07 PROCEDURE — 99213 OFFICE O/P EST LOW 20 MIN: CPT | Performed by: FAMILY MEDICINE

## 2022-11-07 RX ORDER — CLOBETASOL PROPIONATE 0.5 MG/G
OINTMENT TOPICAL 2 TIMES DAILY
Qty: 60 G | Refills: 2 | Status: SHIPPED | OUTPATIENT
Start: 2022-11-07

## 2022-11-07 NOTE — PROGRESS NOTES
Subjective   Kelvin Restrepo is a 89 y.o. female. Presents today for   Chief Complaint   Patient presents with   • Urinary Tract Infection       History of Present Illness  Patient reports feeling better;  No further abd pain;  Was not having urinary symptoms but had severe suprapubic pressure and on exam; with prior CT suggesting cystitis.  Took abx and resolved;  Urine culture ws viridans strep;  She does have lichen slcreosis relieved with steroid topical of which needs.  Has panic attacks, could be cause of symptosm as feels very anxious when abd cramping.    Review of Systems   Respiratory: Negative for shortness of breath.    Cardiovascular: Negative for chest pain and palpitations.   Gastrointestinal: Negative for abdominal pain.   Genitourinary: Negative for difficulty urinating and frequency.       Patient Active Problem List   Diagnosis   • Suprapubic pain   • Allergic state   • Chronic constipation   • Gastroesophageal reflux disease   • Fatigue   • Hyperlipidemia   • Hypothyroidism   • Insomnia   • Obstructive sleep apnea syndrome   • Osteoarthritis of multiple joints   • Osteoporosis   • Cobalamin deficiency   • Essential hypertension   • Anxiety   • H/O right mastectomy   • H/O hyperlipidemia   • History of breast cancer   • History of hypothyroidism   • Seasonal allergic rhinitis       Social History     Socioeconomic History   • Marital status:    Tobacco Use   • Smoking status: Never   • Smokeless tobacco: Never   Vaping Use   • Vaping Use: Never used   Substance and Sexual Activity   • Alcohol use: No   • Drug use: No   • Sexual activity: Not Currently     Partners: Male     Birth control/protection: Post-menopausal       Allergies   Allergen Reactions   • Amlodipine      Patient does not recall ever having a reaction to this medication    • Prednisone Hives   • Atorvastatin Myalgia       Current Outpatient Medications on File Prior to Visit   Medication Sig Dispense Refill   • aspirin 325  MG tablet Take 1 tablet by mouth Daily. PLEASE CALL THE OFFICE FOR AN APPT 15 tablet 0   • carbamide peroxide (DEBROX) 6.5 % otic solution Administer 5 drops into both ears 2 (Two) Times a Day. 22 mL 12   • cephalexin (KEFLEX) 500 MG capsule Take 1 capsule by mouth 2 (Two) Times a Day. 14 capsule 0   • cetirizine (zyrTEC) 10 MG tablet Take 1 tablet by mouth Daily. 90 tablet 2   • citalopram (CeleXA) 20 MG tablet 1 po daily 90 tablet 1   • clobetasol (TEMOVATE) 0.05 % ointment      • clotrimazole-betamethasone (Lotrisone) 1-0.05 % cream Apply 1 application topically to the appropriate area as directed 2 (Two) Times a Day. Left wrist 30 g 1   • famotidine (PEPCID) 40 MG tablet Take 1 tablet by mouth Daily. 30 tablet 5   • fluticasone (Flonase) 50 MCG/ACT nasal spray 2 sprays into the nostril(s) as directed by provider Daily. 16 g 2   • ketorolac (ACULAR) 0.5 % ophthalmic solution      • levothyroxine (SYNTHROID, LEVOTHROID) 112 MCG tablet Take 1 tablet by mouth Daily. 90 tablet 1   • lisinopril (PRINIVIL,ZESTRIL) 10 MG tablet Take 1 tablet by mouth Daily. 90 tablet 1   • LORazepam (Ativan) 0.5 MG tablet 1 po daily prn anxiety 90 tablet 0   • montelukast (SINGULAIR) 10 MG tablet Take 1 tablet by mouth Daily. 90 tablet 1   • Multiple Minerals-Vitamins (CALCIUM & VIT D3 BONE HEALTH PO) Take 1 tablet by mouth Daily.     • multivitamin with minerals tablet tablet Take 1 tablet by mouth Daily.     • omeprazole (priLOSEC) 20 MG capsule      • ondansetron ODT (ZOFRAN-ODT) 4 MG disintegrating tablet Place 1 tablet on the tongue Every 8 (Eight) Hours As Needed for Nausea or Vomiting. 24 tablet 0   • Probiotic Product (ALIGN PO) Take  by mouth.     • sucralfate (Carafate) 1 g tablet 1 po 4x daily before meals and bedtime as needed reflux/nausea 360 tablet 3   • vitamin B-12 (CYANOCOBALAMIN) 1000 MCG tablet Take 1 tablet by mouth Daily. 90 tablet 3   • vitamin D3 125 MCG (5000 UT) capsule capsule Take 1 capsule by mouth Daily. 90  "capsule 3     No current facility-administered medications on file prior to visit.       Objective   Vitals:    11/07/22 1443   BP: 116/78   Pulse: 85   SpO2: 94%   Weight: 71.2 kg (157 lb)   Height: 156.2 cm (61.5\")     Body mass index is 29.18 kg/m².    Physical Exam  Vitals and nursing note reviewed.   Constitutional:       Appearance: Normal appearance. She is not toxic-appearing or diaphoretic.   HENT:      Head: Normocephalic and atraumatic.   Musculoskeletal:      Cervical back: Neck supple.   Skin:     General: Skin is warm and dry.      Capillary Refill: Capillary refill takes less than 2 seconds.   Neurological:      Mental Status: She is alert.   Psychiatric:         Mood and Affect: Mood normal.         Behavior: Behavior normal.         Assessment & Plan   Diagnoses and all orders for this visit:    1. Acute UTI (Primary)  -     Urine Culture - , Urine, Clean Catch    2. Lichen sclerosus et atrophicus  -     clobetasol (TEMOVATE) 0.05 % ointment; Apply  topically to the appropriate area as directed 2 (Two) Times a Day.  Dispense: 60 g; Refill: 2    ok for urine culture check  refiled topical steroid  Has ativan for panic attack           -Follow up: Prn - RTC if worse or no improvement.    "

## 2022-11-09 LAB
BACTERIA UR CULT: NORMAL
BACTERIA UR CULT: NORMAL

## 2022-12-13 ENCOUNTER — TELEPHONE (OUTPATIENT)
Dept: FAMILY MEDICINE CLINIC | Facility: CLINIC | Age: 87
End: 2022-12-13

## 2022-12-13 RX ORDER — LANOLIN ALCOHOL/MO/W.PET/CERES
1000 CREAM (GRAM) TOPICAL DAILY
Qty: 90 TABLET | Refills: 3 | Status: SHIPPED | OUTPATIENT
Start: 2022-12-13

## 2022-12-13 NOTE — TELEPHONE ENCOUNTER
Caller: Kelvin Restrepo    Relationship: Self    Best call back number: [phone] (Home)    Requested Prescriptions:   vitamin B-12 (CYANOCOBALAMIN) 1000 MCG tablet    Pharmacy where request should be sent: [facility] - [place], [place] - [address] - [phone] [facility] [phone] [place]          Additional details provided by patient: PATIENT CALLED TO REQUEST A MEDICATION REFILL ON HER MEDICATION. PATIENT STATES THAT SHE HAS A 3 DAY SUPPLY LEFT.            Does the patient have less than a 3 day supply:  [] Yes  [x] No    Would you like a call back once the refill request has been completed: [] Yes [x] No    If the office needs to give you a call back, can they leave a voicemail: [] Yes [] No    Redd Gaona Rep   12/13/22 12:52 EST         THANKS

## 2023-01-20 ENCOUNTER — OFFICE VISIT (OUTPATIENT)
Dept: FAMILY MEDICINE CLINIC | Facility: CLINIC | Age: 88
End: 2023-01-20
Payer: MEDICARE

## 2023-01-20 VITALS
SYSTOLIC BLOOD PRESSURE: 128 MMHG | DIASTOLIC BLOOD PRESSURE: 70 MMHG | BODY MASS INDEX: 28.7 KG/M2 | RESPIRATION RATE: 20 BRPM | HEART RATE: 106 BPM | WEIGHT: 152 LBS | OXYGEN SATURATION: 100 % | TEMPERATURE: 97.1 F | HEIGHT: 61 IN

## 2023-01-20 DIAGNOSIS — M54.50 CHRONIC LEFT-SIDED LOW BACK PAIN WITHOUT SCIATICA: ICD-10-CM

## 2023-01-20 DIAGNOSIS — G89.29 CHRONIC LEFT-SIDED LOW BACK PAIN WITHOUT SCIATICA: ICD-10-CM

## 2023-01-20 DIAGNOSIS — M79.10 MYALGIA: Primary | ICD-10-CM

## 2023-01-20 PROCEDURE — 99213 OFFICE O/P EST LOW 20 MIN: CPT

## 2023-01-20 RX ORDER — LIDOCAINE 50 MG/G
1 PATCH TOPICAL EVERY 24 HOURS
Qty: 15 EACH | Refills: 0 | Status: SHIPPED | OUTPATIENT
Start: 2023-01-20

## 2023-01-20 NOTE — PROGRESS NOTES
"Subjective   Kelvin Restrepo is a 89 y.o. female. Who presents for routine follow up and c.o left lower back pain      History of Present Illness     Left lower back pain- chronic issue. Intermittent pain in L lower side and flank area. More muscular. No radiation of pain. No injuries. No weakness. No urinary symptoms.   Has tried some topical tx with no much relief. Does not like taking medications. States can tolerate pain.       The following portions of the patient's history were reviewed and updated as appropriate: allergies, current medications, past family history, past medical history, past social history and past surgical history.    Review of Systems   Constitutional: Negative for fatigue, fever and unexpected weight loss.   Respiratory: Negative.    Genitourinary: Positive for flank pain. Negative for decreased urine volume, difficulty urinating, dysuria, hematuria, pelvic pain, pelvic pressure, urgency and urinary incontinence.   Musculoskeletal: Positive for back pain and myalgias. Negative for gait problem and joint swelling.   Neurological: Negative for weakness and numbness.   Psychiatric/Behavioral: Negative for sleep disturbance. The patient is not nervous/anxious (stable).        Objective    /70   Pulse 106   Temp 97.1 °F (36.2 °C) (Temporal)   Resp 20   Ht 156.2 cm (61.5\")   Wt 68.9 kg (152 lb)   SpO2 100%   BMI 28.26 kg/m²     Physical Exam  Constitutional:       Appearance: Normal appearance. She is not ill-appearing.   Cardiovascular:      Rate and Rhythm: Normal rate and regular rhythm.      Pulses: Normal pulses.      Heart sounds: Normal heart sounds, S1 normal and S2 normal. No murmur heard.  Pulmonary:      Effort: Pulmonary effort is normal. No respiratory distress.      Breath sounds: Normal breath sounds.   Musculoskeletal:      Thoracic back: Normal.      Lumbar back: Tenderness present. No swelling, spasms or bony tenderness. Normal range of motion. Negative right " straight leg raise test and negative left straight leg raise test.        Back:       Right hip: No tenderness or bony tenderness.      Left hip: No tenderness or bony tenderness.   Neurological:      General: No focal deficit present.      Mental Status: She is alert and oriented to person, place, and time.      Cranial Nerves: No dysarthria.      Gait: Gait is intact.   Psychiatric:         Attention and Perception: Attention normal.         Mood and Affect: Mood normal.         Speech: Speech normal.         Behavior: Behavior normal.         Thought Content: Thought content normal.         Cognition and Memory: Cognition normal.         Judgment: Judgment normal.           Assessment & Plan   Diagnoses and all orders for this visit:    1. Myalgia (Primary)  -     lidocaine (LIDODERM) 5 %; Place 1 patch on the skin as directed by provider Daily. Remove & Discard patch within 12 hours or as directed by MD  Dispense: 15 each; Refill: 0    2. Chronic left-sided low back pain without sciatica  -     lidocaine (LIDODERM) 5 %; Place 1 patch on the skin as directed by provider Daily. Remove & Discard patch within 12 hours or as directed by MD  Dispense: 15 each; Refill: 0    will give topical lidocaine to trial. Advised can also use ice/heat. May trial icy/hot if lidocaine patches not covered.     No other concerns today. States her anxiety is stable on current tx. Recent UTI resolved. Recent labs stable    Follow up: sometime after 3/30 for MWV, sooner as needed.          - Pt agrees with plan of care and states no further concerns or questions today    This document is intended for medical expert use only. Reading of this document by patients and/or patient's family without participating medical staff guidance may result in misinterpretation and unintended morbidity.  Any interpretation of such data is the responsibility of the patient and/or family member responsible for the patient in concert with their primary or  specialist providers, not to be left for sources of online searches such as [employer], sezmi or similar queries. Relying on these approaches to knowledge may result in misinterpretation, misguided goals of care and even death should patients or family members try recommendations outside of the realm of professional medical care in a supervised way.     Please allow 3-5 business days for recommendations based on new results     Go to the ER for any possible lifethreatening symptoms such as chest pain or shortness of air.      I personally spent 15 minutes with this patient, preparing for the visit, reviewing tests, obtaining and/or reviewing a separately obtained history, performing a medically appropriate examination and/or evaluation , counseling and educating the patient/family/caregiver, ordering medications, tests, or procedures, documenting information in the medical record and independently interpreting results.

## 2023-02-08 ENCOUNTER — TELEPHONE (OUTPATIENT)
Dept: FAMILY MEDICINE CLINIC | Facility: CLINIC | Age: 88
End: 2023-02-08

## 2023-02-08 NOTE — TELEPHONE ENCOUNTER
Caller: Kelvin Restrepo    Relationship: Self    Best call back number: [phone]    What is the best time to reach you: ANY    Who are you requesting to speak with (clinical staff, provider,  specific staff member): CLINICAL STAFF    Do you know the name of the person who called: PATIENT    What was the call regarding: WOULD LIKE TO GET A HANDICAP PLAQUE.  HOW DOES SHE GO ABOUT DOING THAT?    Do you require a callback: YES

## 2023-03-01 DIAGNOSIS — K21.9 GASTROESOPHAGEAL REFLUX DISEASE, UNSPECIFIED WHETHER ESOPHAGITIS PRESENT: Primary | ICD-10-CM

## 2023-03-01 RX ORDER — OMEPRAZOLE 20 MG/1
20 CAPSULE, DELAYED RELEASE ORAL DAILY
Qty: 90 CAPSULE | Refills: 1 | Status: SHIPPED | OUTPATIENT
Start: 2023-03-01

## 2023-03-01 NOTE — TELEPHONE ENCOUNTER
Caller: Kelvin Restrepo    Relationship: Self    Best call back number: [phone]    Requested Prescriptions:   Requested Prescriptions     Pending Prescriptions Disp Refills   • omeprazole (priLOSEC) 20 MG capsule          Pharmacy where request should be sent: [facility] [place] [facility] [place] - [address] [phone] [facility] [phone] FX     Does the patient have less than a 3 day supply:  [] Yes  [x] No    Would you like a call back once the refill request has been completed: [] Yes [x] No    If the office needs to give you a call back, can they leave a voicemail: [] Yes [x] No    Redd Horton Rep   03/01/23 11:10 EST

## 2023-04-13 ENCOUNTER — TELEPHONE (OUTPATIENT)
Dept: FAMILY MEDICINE CLINIC | Facility: CLINIC | Age: 88
End: 2023-04-13
Payer: MEDICARE

## 2023-04-13 DIAGNOSIS — E78.49 OTHER HYPERLIPIDEMIA: ICD-10-CM

## 2023-04-13 DIAGNOSIS — E03.8 OTHER SPECIFIED HYPOTHYROIDISM: ICD-10-CM

## 2023-04-13 DIAGNOSIS — I10 ESSENTIAL HYPERTENSION: ICD-10-CM

## 2023-04-13 DIAGNOSIS — E16.2 HYPOGLYCEMIA: ICD-10-CM

## 2023-04-13 DIAGNOSIS — N39.0 ACUTE UTI: ICD-10-CM

## 2023-04-13 DIAGNOSIS — E53.8 B12 DEFICIENCY: Primary | ICD-10-CM

## 2023-04-13 DIAGNOSIS — R53.83 OTHER FATIGUE: ICD-10-CM

## 2023-04-13 DIAGNOSIS — E55.9 VITAMIN D DEFICIENCY: ICD-10-CM

## 2023-04-13 NOTE — TELEPHONE ENCOUNTER
Pt's friend called and said pt has been a mess and very anxious the last 3-4 days. He said patient feels like her blood sugars are low and has been eating peanut butter, eggs and grits. She has been drinking water. Pt's friend wants to know what pt needs to do?

## 2023-04-14 LAB
25(OH)D3+25(OH)D2 SERPL-MCNC: 45.1 NG/ML (ref 30–100)
ALBUMIN SERPL-MCNC: 4.3 G/DL (ref 3.5–4.6)
ALBUMIN/GLOB SERPL: 1.8 {RATIO} (ref 1.2–2.2)
ALP SERPL-CCNC: 73 IU/L (ref 44–121)
ALT SERPL-CCNC: 16 IU/L (ref 0–32)
APPEARANCE UR: CLEAR
AST SERPL-CCNC: 19 IU/L (ref 0–40)
BACTERIA #/AREA URNS HPF: NORMAL /[HPF]
BASOPHILS # BLD AUTO: 0 X10E3/UL (ref 0–0.2)
BASOPHILS NFR BLD AUTO: 0 %
BILIRUB SERPL-MCNC: 0.3 MG/DL (ref 0–1.2)
BILIRUB UR QL STRIP: NEGATIVE
BUN SERPL-MCNC: 15 MG/DL (ref 10–36)
BUN/CREAT SERPL: 19 (ref 12–28)
CALCIUM SERPL-MCNC: 9.5 MG/DL (ref 8.7–10.3)
CASTS URNS QL MICRO: NORMAL /LPF
CHLORIDE SERPL-SCNC: 94 MMOL/L (ref 96–106)
CHOLEST SERPL-MCNC: 227 MG/DL (ref 100–199)
CO2 SERPL-SCNC: 24 MMOL/L (ref 20–29)
COLOR UR: YELLOW
CREAT SERPL-MCNC: 0.79 MG/DL (ref 0.57–1)
EGFRCR SERPLBLD CKD-EPI 2021: 70 ML/MIN/1.73
EOSINOPHIL # BLD AUTO: 0 X10E3/UL (ref 0–0.4)
EOSINOPHIL NFR BLD AUTO: 0 %
EPI CELLS #/AREA URNS HPF: NORMAL /HPF (ref 0–10)
ERYTHROCYTE [DISTWIDTH] IN BLOOD BY AUTOMATED COUNT: 12.9 % (ref 11.7–15.4)
GLOBULIN SER CALC-MCNC: 2.4 G/DL (ref 1.5–4.5)
GLUCOSE SERPL-MCNC: 78 MG/DL (ref 70–99)
GLUCOSE UR QL STRIP: NEGATIVE
HBA1C MFR BLD: 5.8 % (ref 4.8–5.6)
HCT VFR BLD AUTO: 40 % (ref 34–46.6)
HDLC SERPL-MCNC: 37 MG/DL
HGB BLD-MCNC: 13.6 G/DL (ref 11.1–15.9)
HGB UR QL STRIP: NEGATIVE
IMM GRANULOCYTES # BLD AUTO: 0 X10E3/UL (ref 0–0.1)
IMM GRANULOCYTES NFR BLD AUTO: 1 %
KETONES UR QL STRIP: NEGATIVE
LDLC SERPL CALC-MCNC: 142 MG/DL (ref 0–99)
LEUKOCYTE ESTERASE UR QL STRIP: ABNORMAL
LYMPHOCYTES # BLD AUTO: 1.2 X10E3/UL (ref 0.7–3.1)
LYMPHOCYTES NFR BLD AUTO: 18 %
MCH RBC QN AUTO: 28.9 PG (ref 26.6–33)
MCHC RBC AUTO-ENTMCNC: 34 G/DL (ref 31.5–35.7)
MCV RBC AUTO: 85 FL (ref 79–97)
MICRO URNS: ABNORMAL
MONOCYTES # BLD AUTO: 0.8 X10E3/UL (ref 0.1–0.9)
MONOCYTES NFR BLD AUTO: 12 %
NEUTROPHILS # BLD AUTO: 4.5 X10E3/UL (ref 1.4–7)
NEUTROPHILS NFR BLD AUTO: 69 %
NITRITE UR QL STRIP: NEGATIVE
PH UR STRIP: 6.5 [PH] (ref 5–7.5)
PLATELET # BLD AUTO: 227 X10E3/UL (ref 150–450)
POTASSIUM SERPL-SCNC: 5.1 MMOL/L (ref 3.5–5.2)
PROT SERPL-MCNC: 6.7 G/DL (ref 6–8.5)
PROT UR QL STRIP: NEGATIVE
RBC # BLD AUTO: 4.7 X10E6/UL (ref 3.77–5.28)
RBC #/AREA URNS HPF: NORMAL /HPF (ref 0–2)
SODIUM SERPL-SCNC: 132 MMOL/L (ref 134–144)
SP GR UR STRIP: 1.01 (ref 1–1.03)
T3FREE SERPL-MCNC: 2.2 PG/ML (ref 2–4.4)
T4 FREE SERPL-MCNC: 1.52 NG/DL (ref 0.82–1.77)
TRIGL SERPL-MCNC: 265 MG/DL (ref 0–149)
TSH SERPL DL<=0.005 MIU/L-ACNC: 1.64 UIU/ML (ref 0.45–4.5)
UROBILINOGEN UR STRIP-MCNC: 0.2 MG/DL (ref 0.2–1)
VIT B12 SERPL-MCNC: 867 PG/ML (ref 232–1245)
VLDLC SERPL CALC-MCNC: 48 MG/DL (ref 5–40)
WBC # BLD AUTO: 6.4 X10E3/UL (ref 3.4–10.8)
WBC #/AREA URNS HPF: NORMAL /HPF (ref 0–5)

## 2023-04-16 DIAGNOSIS — E87.1 HYPONATREMIA: Primary | ICD-10-CM

## 2023-04-16 NOTE — PROGRESS NOTES
Call results to patient.  Sodium level a little low which can cause fatigue.  Return to check urine and further blood work.    A1c prediabetes range  Cholesterol high, work on diet  Vitamin D improved and normal range now  Thyroid and b12 normal  Blood counts normal  Urine negative

## 2023-04-20 ENCOUNTER — OFFICE VISIT (OUTPATIENT)
Dept: FAMILY MEDICINE CLINIC | Facility: CLINIC | Age: 88
End: 2023-04-20
Payer: MEDICARE

## 2023-04-20 VITALS
HEIGHT: 62 IN | OXYGEN SATURATION: 97 % | SYSTOLIC BLOOD PRESSURE: 124 MMHG | BODY MASS INDEX: 28.16 KG/M2 | DIASTOLIC BLOOD PRESSURE: 90 MMHG | HEART RATE: 92 BPM

## 2023-04-20 DIAGNOSIS — F41.1 GAD (GENERALIZED ANXIETY DISORDER): ICD-10-CM

## 2023-04-20 DIAGNOSIS — K59.04 CHRONIC IDIOPATHIC CONSTIPATION: ICD-10-CM

## 2023-04-20 DIAGNOSIS — R53.83 OTHER FATIGUE: Primary | ICD-10-CM

## 2023-04-20 DIAGNOSIS — I10 ESSENTIAL HYPERTENSION: ICD-10-CM

## 2023-04-20 DIAGNOSIS — E87.1 HYPONATREMIA: ICD-10-CM

## 2023-04-20 DIAGNOSIS — E53.8 B12 DEFICIENCY: ICD-10-CM

## 2023-04-20 RX ORDER — CYANOCOBALAMIN 1000 UG/ML
1000 INJECTION, SOLUTION INTRAMUSCULAR; SUBCUTANEOUS
Status: SHIPPED | OUTPATIENT
Start: 2023-04-20

## 2023-04-20 RX ORDER — METOPROLOL SUCCINATE 25 MG/1
25 TABLET, EXTENDED RELEASE ORAL DAILY
Qty: 30 TABLET | Refills: 5 | Status: SHIPPED | OUTPATIENT
Start: 2023-04-20

## 2023-04-20 RX ORDER — CITALOPRAM 10 MG/1
TABLET ORAL
Qty: 30 TABLET | Refills: 5 | Status: SHIPPED | OUTPATIENT
Start: 2023-04-20 | End: 2023-04-20 | Stop reason: SDUPTHER

## 2023-04-20 RX ORDER — CITALOPRAM 10 MG/1
TABLET ORAL
Qty: 30 TABLET | Refills: 5 | Status: SHIPPED | OUTPATIENT
Start: 2023-04-20

## 2023-04-20 RX ADMIN — CYANOCOBALAMIN 1000 MCG: 1000 INJECTION, SOLUTION INTRAMUSCULAR; SUBCUTANEOUS at 10:03

## 2023-04-20 NOTE — PROGRESS NOTES
Subjective   Kelvin Restrepo is a 89 y.o. female. Presents today for   Chief Complaint   Patient presents with   • Anxiety   • GI Problem     Stomach issues when eating after she eats she blows up   • Hypertension       History of Present Illness  Patient 89 y/o female here for feeling fatigued, anxious and having panic attacks;  Taking lorazepam but still anxious.   Not taking celexa now, but did feel hleped;  Has had low B12 and would like injections again as felt helped energy level.   She was seen in ED and sodium down a little more;  No liver disease;  No renal disease;  No cp/soa; no pnd/orthopnea and no hx of chf;  Has been on acei for a while for htn;   She also having bloating and chronic constipation and cramping;   Stools softeners some relief;  No focal pain; no blood in stool;  No f/c;      Review of Systems   Constitutional: Positive for fatigue. Negative for chills and fever.   Respiratory: Negative for shortness of breath.    Cardiovascular: Negative for chest pain, palpitations and leg swelling.   Gastrointestinal: Positive for abdominal pain and constipation. Negative for blood in stool, nausea and vomiting.       Patient Active Problem List   Diagnosis   • Suprapubic pain   • Allergic state   • Chronic constipation   • Gastroesophageal reflux disease   • Fatigue   • Hyperlipidemia   • Hypothyroidism   • Insomnia   • Obstructive sleep apnea syndrome   • Osteoarthritis of multiple joints   • Osteoporosis   • Cobalamin deficiency   • Essential hypertension   • Anxiety   • H/O right mastectomy   • H/O hyperlipidemia   • History of breast cancer   • History of hypothyroidism   • Seasonal allergic rhinitis       Social History     Socioeconomic History   • Marital status:    Tobacco Use   • Smoking status: Never   • Smokeless tobacco: Never   Vaping Use   • Vaping Use: Never used   Substance and Sexual Activity   • Alcohol use: No   • Drug use: No   • Sexual activity: Not Currently     Partners:  Male     Birth control/protection: Post-menopausal       Allergies   Allergen Reactions   • Amlodipine      Patient does not recall ever having a reaction to this medication    • Prednisone Hives   • Atorvastatin Myalgia       Current Outpatient Medications on File Prior to Visit   Medication Sig Dispense Refill   • aspirin 325 MG tablet Take 1 tablet by mouth Daily. PLEASE CALL THE OFFICE FOR AN APPT 15 tablet 0   • cetirizine (zyrTEC) 10 MG tablet Take 1 tablet by mouth Daily. 90 tablet 2   • clobetasol (TEMOVATE) 0.05 % ointment Apply  topically to the appropriate area as directed 2 (Two) Times a Day. 60 g 2   • fluticasone (Flonase) 50 MCG/ACT nasal spray 2 sprays into the nostril(s) as directed by provider Daily. 16 g 2   • ketorolac (ACULAR) 0.5 % ophthalmic solution      • levothyroxine (SYNTHROID, LEVOTHROID) 112 MCG tablet Take 1 tablet by mouth Daily. 90 tablet 1   • LORazepam (Ativan) 0.5 MG tablet 1 po daily prn anxiety 90 tablet 0   • Multiple Minerals-Vitamins (CALCIUM & VIT D3 BONE HEALTH PO) Take 1 tablet by mouth Daily.     • multivitamin with minerals tablet tablet Take 1 tablet by mouth Daily.     • omeprazole (priLOSEC) 20 MG capsule Take 1 capsule by mouth Daily. 90 capsule 1   • Probiotic Product (ALIGN PO) Take  by mouth.     • vitamin B-12 (CYANOCOBALAMIN) 1000 MCG tablet Take 1 tablet by mouth Daily. 90 tablet 3   • vitamin D3 125 MCG (5000 UT) capsule capsule Take 1 capsule by mouth Daily. 90 capsule 3   • [DISCONTINUED] lisinopril (PRINIVIL,ZESTRIL) 10 MG tablet Take 1 tablet by mouth Daily. 90 tablet 1   • ondansetron ODT (ZOFRAN-ODT) 4 MG disintegrating tablet Place 1 tablet on the tongue Every 8 (Eight) Hours As Needed for Nausea or Vomiting. (Patient not taking: Reported on 4/20/2023) 24 tablet 0   • [DISCONTINUED] carbamide peroxide (DEBROX) 6.5 % otic solution Administer 5 drops into both ears 2 (Two) Times a Day. (Patient not taking: Reported on 4/20/2023) 22 mL 12   •  "[DISCONTINUED] citalopram (CeleXA) 20 MG tablet 1 po daily (Patient not taking: Reported on 4/20/2023) 90 tablet 1   • [DISCONTINUED] clotrimazole-betamethasone (Lotrisone) 1-0.05 % cream Apply 1 application topically to the appropriate area as directed 2 (Two) Times a Day. Left wrist (Patient not taking: Reported on 4/20/2023) 30 g 1   • [DISCONTINUED] lidocaine (LIDODERM) 5 % Place 1 patch on the skin as directed by provider Daily. Remove & Discard patch within 12 hours or as directed by MD (Patient not taking: Reported on 4/20/2023) 15 each 0   • [DISCONTINUED] montelukast (SINGULAIR) 10 MG tablet Take 1 tablet by mouth Daily. (Patient not taking: Reported on 4/20/2023) 90 tablet 1   • [DISCONTINUED] sucralfate (Carafate) 1 g tablet 1 po 4x daily before meals and bedtime as needed reflux/nausea (Patient not taking: Reported on 4/20/2023) 360 tablet 3     No current facility-administered medications on file prior to visit.       Objective   Vitals:    04/20/23 0834   BP: 124/90   Pulse: 92   SpO2: 97%   Weight: Comment: pt is in wheelchair   Height: 156.5 cm (61.6\")   PainSc: 0-No pain     Body mass index is 28.16 kg/m².    Physical Exam  Vitals and nursing note reviewed.   Constitutional:       Appearance: She is well-developed.   HENT:      Head: Normocephalic and atraumatic.   Neck:      Thyroid: No thyromegaly.      Vascular: No JVD.   Cardiovascular:      Rate and Rhythm: Normal rate and regular rhythm.      Heart sounds: Normal heart sounds. No murmur heard.    No friction rub. No gallop.   Pulmonary:      Effort: Pulmonary effort is normal. No respiratory distress.      Breath sounds: Normal breath sounds. No wheezing or rales.   Abdominal:      General: Bowel sounds are normal. There is no distension.      Palpations: Abdomen is soft.      Tenderness: There is no abdominal tenderness. There is no guarding or rebound.   Musculoskeletal:      Cervical back: Neck supple.   Skin:     General: Skin is warm and " dry.   Neurological:      Mental Status: She is alert.   Psychiatric:         Behavior: Behavior normal.         Urinalysis With Culture If Indicated -  Order: [id number]   suggestion  Result Information displayed in this report will not trend and may not trigger automated decision support.     Component   Ref Range & Units 4 d ago   Color, UA  YELLOW    Appearance, UA  CLEAR    Glucose, UA   mg/dL NEG    Bilirubin, UA  NEG    Ketones, UA  NEG    Specific Gravity, UA   1.003 - 1.030 1.011    URINE BLOOD DIPSTICK   /uL NEG    pH, UA   5.0 - 8.0 8.0    Protein, UA   NEG NEG    Urobilinogen, UA   EU/dL 0.2    Nitrite, UA   NEG NEG    Leuk Esterase, UA   Negative 1+ Abnormal     Specimen Type:  U CleanCatch    WBC, UA   0 - 5 /HPF 0-2    RBC, UA   0 - 2 /HPF 2-5    UR EPITHELIAL CELLS   0 - 5 /HPF 2-5    Bacteria, UA   /HPF NEG    UR CAST   0 - 5 /LPF 0-2    Specimen Collected: 04/16/23 11:57 Last Resulted: 04/16/23 13:22   Received From: [facility]  Result Received: 04/20/23 08:24     Received Information  Urinalysis, Microscopic Only -  Order: [id number]   Collected 4/16/2023 11:57     Specimen Information: Urine       Component  Ref Range & Units 4 d ago   Urinalysis Gross Exam  Correlated Correlated         View Full Report     Narrative    Added by Discern Expert              TROPONIN I, HIGH SENSITIVE  Order: [id number]   suggestion  Information displayed in this report may not trend or trigger automated decision support.     Component   Ref Range & Units 4 d ago   TROPONIN I, HIGH SENSITIVE   <=14 ng/Liter 6    Specimen Collected: 04/16/23 11:40 Last Resulted: 04/16/23 12:43   Received From: [facility]  Result Received: 04/20/23 08:24     Received Information  COVID-19 PCR, [facility], NP SWAB IN [facility] VIRAL TRANSPORT MEDIA/ORAL SWISH 24-30 HR TAT - ,  Order: [id number]   Collected 4/16/2023 11:40     Specimen Information: Other       Component  Ref Range & Units    SARS-CoV-2, RAMONA  Negative NEGATIVE     Comment: The 2019-CoV rRT-PCR Assay is only for use under a Food and Drug Administration Emergency Use Authorization. The performance characteristics of the assay were verified by the Clinical Laboratory at [facility]. Results should be used in   conjunction with the patient's clinical symptoms, medical history, and other clinical/laboratory findings to determine an overall clinical diagnosis. Negative results do not preclude infection with SARS-CoV-2 (COVID-19).     Test parameters have not been validated for screening asymptomatic patients.   Influenza A PCR  Negative NEGATIVE    Influenza B PCR  Negative NEGATIVE    RSV, PCR  Negative NEGATIVE    Test Comment: Unknown    COMMENTS No    Symptom Unknown    DATE 4/10/23    COMMENTS No    COMMENTS No    COMMENTS No    Pregnant? No         View Full Report                CBC AND DIFFERENTIAL  Order: [id number]   suggestion  Result Information displayed in this report will not trend and may not trigger automated decision support.     Component   Ref Range & Units 4 d ago   WBC   4.0 - 10.5 x10(3)/ul 8.4    RBC   3.90 - 5.30 x10(6)/ul 4.62    Hemoglobin   12.0 - 16.0 Gram/dL 13.5    Hematocrit   35.0 - 45.0 % 39.4    MCV   83.0 - 96.0 fL 85.2    MCH   28.0 - 34.0 pg 29.2    MCHC   30.0 - 36.0 Gram/dL 34.2    RDW   11.0 - 15.5 % 13.9    Platelets   140 - 420 x10(3)/ul 221    MPV   6.5 - 11.5 fL 8.0    Scan Slide  No    Specimen Collected: 04/16/23 11:40 Last Resulted: 04/16/23 12:26   Received From: [facility]  Result Received: 04/20/23 08:24     Received Information   Contains abnormal data COMPREHENSIVE METABOLIC PANEL  Order: [id number]   suggestion  Result Information displayed in this report will not trend and may not trigger automated decision support.     Component   Ref Range & Units 4 d ago   Sodium   135 - 145 mMole/Liter 130 Low     Potassium   3.5 - 5.1 mmol/L 3.9    Chloride   100 - 111 mmol/L 96 Low     CO2   22 - 31 mmol/L 24    Anion  Gap  10    Calcium   8.4 - 10.2 mg/dL 9.0    Glucose   70 - 110 mg/dL 88    BUN   9 - 23 mg/dL 22    Creatinine   0.6 - 1.4 mg/dL 0.7    BUN/Creatinine Ratio  31.43    Albumin   3.5 - 5.0 Gram/dL 3.7    Total Protein   6.0 - 8.3 Gram/dL 6.8    A/G Ratio   1.10 - 1.90 1.19    Alkaline Phosphatase   32 - 92 Units/Liter 58    ALT (SGPT)   10 - 40 Units/Liter 18    AST   10 - 42 Units/Liter 21    Total Bilirubin   0.2 - 2.0 mg/dL 0.6    Globulin  3    EGFR   >=60 mL/min/1.73m2 81    Comment: eGFR calculation performed using the CKD-EPI 2021 equation (race variable excluded)   Specimen Collected: 04/16/23 11:40 Last Resulted: 04/16/23 12:39   Received From: [facility]  Result Received: 04/20/23 08:24     Component      Latest Ref Rn 4/13/2023   WBC      3.4 - 10.8 x10E3/uL 6.4    RBC      3.77 - 5.28 x10E6/uL 4.70    Hemoglobin      11.1 - 15.9 g/dL 13.6    Hematocrit      34.0 - 46.6 % 40.0    MCV      79 - 97 fL 85    MCH      26.6 - 33.0 pg 28.9    MCHC      31.5 - 35.7 g/dL 34.0    RDW      11.7 - 15.4 % 12.9    Platelets      150 - 450 x10E3/uL 227    Neutrophil Rel %      Not Estab. % 69    Lymphocyte Rel %      Not Estab. % 18    Monocyte Rel %      Not Estab. % 12    Eosinophil Rel %      Not Estab. % 0    Basophil Rel %      Not Estab. % 0    Neutrophils Absolute      1.4 - 7.0 x10E3/uL 4.5    Lymphocytes Absolute      0.7 - 3.1 x10E3/uL 1.2    Monocytes Absolute      0.1 - 0.9 x10E3/uL 0.8    Eosinophils Absolute      0.0 - 0.4 x10E3/uL 0.0    Basophils Absolute      0.0 - 0.2 x10E3/uL 0.0    Immature Granulocyte Rel %      Not Estab. % 1    Immature Grans, Absolute      0.0 - 0.1 x10E3/uL 0.0    Glucose      70 - 99 mg/dL 78    BUN      10 - 36 mg/dL 15    Creatinine      0.57 - 1.00 mg/dL 0.79    EGFR Result      >59 mL/min/1.73 70    BUN/Creatinine Ratio      12 - 28  19    Sodium      134 - 144 mmol/L 132 (L)    Potassium      3.5 - 5.2 mmol/L 5.1    Chloride      96 - 106 mmol/L 94 (L)    CO2      20  - 29 mmol/L 24    Calcium      8.7 - 10.3 mg/dL 9.5    Total Protein      6.0 - 8.5 g/dL 6.7    Albumin      3.5 - 4.6 g/dL 4.3    Globulin      1.5 - 4.5 g/dL 2.4    A/G Ratio      1.2 - 2.2  1.8    Total Bilirubin      0.0 - 1.2 mg/dL 0.3    Alkaline Phosphatase      44 - 121 IU/L 73    AST (SGOT)      0 - 40 IU/L 19    ALT (SGPT)      0 - 32 IU/L 16    Specific Gravity, UA      1.005 - 1.030  1.011    pH, UA      5.0 - 7.5  6.5    Color, UA      Yellow  Yellow    Appearance, UA      Clear  Clear    Leukocytes, UA      Negative  Trace !    Protein, UA      Negative/Trace  Negative    Glucose      Negative  Negative    Ketones, UA      Negative  Negative    Blood, UA      Negative  Negative    Bilirubin, UA      Negative  Negative    Urobilinogen, UA      0.2 - 1.0 mg/dL 0.2    Nitrite, UA      Negative  Negative    Microscopic Examination See below:    Total Cholesterol      100 - 199 mg/dL 227 (H)    WBC, UA      0 - 5 /hpf 0-5    Triglycerides      0 - 149 mg/dL 265 (H)    RBC, UA      0 - 2 /hpf 0-2    HDL Cholesterol      >39 mg/dL 37 (L)    Epithelial Cells (non renal)      0 - 10 /hpf 0-10    VLDL Cholesterol Simba      5 - 40 mg/dL 48 (H)    Casts      None seen /lpf None seen    LDL Cholesterol       0 - 99 mg/dL 142 (H)    Bacteria, UA      None seen/Few  None seen    25 Hydroxy, Vitamin D      30.0 - 100.0 ng/mL 45.1    Hemoglobin A1C      4.8 - 5.6 % 5.8 (H)    TSH Baseline      0.450 - 4.500 uIU/mL 1.640    Vitamin B-12      232 - 1,245 pg/mL 867    Free T4      0.82 - 1.77 ng/dL 1.52    T3, Free      2.0 - 4.4 pg/mL 2.2       (L) Low  ! Abnormal  (H) High    Assessment & Plan   Diagnoses and all orders for this visit:    1. Other fatigue (Primary)  -     cyanocobalamin injection 1,000 mcg    2. B12 deficiency  -     cyanocobalamin injection 1,000 mcg    3. Chronic idiopathic constipation  -     linaclotide (LINZESS) 72 MCG capsule capsule; Take 1 capsule by mouth Every Morning Before Breakfast.   Dispense: 30 capsule; Refill: 5    4. DAVIN (generalized anxiety disorder)  -     Discontinue: citalopram (CeleXA) 10 MG tablet; 1/2 po daily x 14d, then 1po daily  Dispense: 30 tablet; Refill: 5  -     citalopram (CeleXA) 10 MG tablet; 1/2 po daily x 14d, then 1po daily  Dispense: 30 tablet; Refill: 5    5. Hyponatremia  -     Sodium, Urine, Random - Urine, Clean Catch  -     Creatinine Urine Random (kidney function) GFR component - Urine, Clean Catch  -     Osmolality, Urine - Urine, Clean Catch  -     Osmolality, Serum  -     Basic Metabolic Panel    6. Essential hypertension  -     metoprolol succinate XL (Toprol XL) 25 MG 24 hr tablet; Take 1 tablet by mouth Daily.  Dispense: 30 tablet; Refill: 5    d/c acei and will try beta blocker ot see if helps Na levela nd will will check serum and urine studies for lwo Na  Restart citalopram as helped mood in past;     Ok start b12 IM monthly   Start linzess for contsipation;  Call if not covered         -Follow up: 4 to 6 weeks and prn

## 2023-04-21 DIAGNOSIS — J30.2 SEASONAL ALLERGIC RHINITIS, UNSPECIFIED TRIGGER: Primary | ICD-10-CM

## 2023-04-21 DIAGNOSIS — F41.0 PANIC ATTACK: ICD-10-CM

## 2023-04-21 DIAGNOSIS — K59.04 CHRONIC IDIOPATHIC CONSTIPATION: ICD-10-CM

## 2023-04-21 NOTE — TELEPHONE ENCOUNTER
Caller: LauroKelvin    Relationship: Self    Best call back number: [phone]    Requested Prescriptions:   Requested Prescriptions     Pending Prescriptions Disp Refills   • LORazepam (Ativan) 0.5 MG tablet 90 tablet 0     Si po daily prn anxiety   • fluticasone (Flonase) 50 MCG/ACT nasal spray 16 g 2     Si sprays into the nostril(s) as directed by provider Daily.        Pharmacy where request should be sent: [facility] [phone] [facility] [phone]      Last office visit with prescribing clinician: 2023   Last telemedicine visit with prescribing clinician: 2023   Next office visit with prescribing clinician: 2023     Additional details provided by patient: PATIENT REQUESTS REFILLS BE SENT TO HER PHARMACY.    Does the patient have less than a 3 day supply:  [] Yes  [x] No    Would you like a call back once the refill request has been completed: [x] Yes [] No    If the office needs to give you a call back, can they leave a voicemail: [x] Yes [] No    PLEASE ADVISE.    Redd Coleman Rep   23 12:18 EDT

## 2023-04-23 DIAGNOSIS — F41.0 PANIC ATTACK: ICD-10-CM

## 2023-04-23 LAB
BUN SERPL-MCNC: 14 MG/DL (ref 8–23)
BUN/CREAT SERPL: 18.2 (ref 7–25)
CALCIUM SERPL-MCNC: 10.1 MG/DL (ref 8.2–9.6)
CHLORIDE SERPL-SCNC: 97 MMOL/L (ref 98–107)
CO2 SERPL-SCNC: 27.3 MMOL/L (ref 22–29)
CREAT SERPL-MCNC: 0.77 MG/DL (ref 0.57–1)
EGFRCR SERPLBLD CKD-EPI 2021: 72.5 ML/MIN/1.73
GLUCOSE SERPL-MCNC: 76 MG/DL (ref 65–99)
OSMOLALITY SERPL: 278 MOSMOL/KG (ref 280–301)
POTASSIUM SERPL-SCNC: 4.8 MMOL/L (ref 3.5–5.2)
SODIUM 24H UR-SCNC: 61 MMOL/L
SODIUM SERPL-SCNC: 136 MMOL/L (ref 136–145)

## 2023-04-23 RX ORDER — LORAZEPAM 0.5 MG/1
TABLET ORAL
Qty: 90 TABLET | Refills: 0 | Status: SHIPPED | OUTPATIENT
Start: 2023-04-23 | End: 2023-04-23 | Stop reason: SDUPTHER

## 2023-04-23 RX ORDER — LORAZEPAM 0.5 MG/1
TABLET ORAL
Qty: 90 TABLET | Refills: 0 | Status: SHIPPED | OUTPATIENT
Start: 2023-04-23

## 2023-04-23 RX ORDER — FLUTICASONE PROPIONATE 50 MCG
2 SPRAY, SUSPENSION (ML) NASAL DAILY
Qty: 16 G | Refills: 2 | Status: SHIPPED | OUTPATIENT
Start: 2023-04-23

## 2023-04-25 ENCOUNTER — TELEPHONE (OUTPATIENT)
Dept: FAMILY MEDICINE CLINIC | Facility: CLINIC | Age: 88
End: 2023-04-25

## 2023-05-12 DIAGNOSIS — I10 ESSENTIAL HYPERTENSION: ICD-10-CM

## 2023-05-12 RX ORDER — METOPROLOL SUCCINATE 25 MG/1
25 TABLET, EXTENDED RELEASE ORAL DAILY
Qty: 30 TABLET | Refills: 5 | Status: SHIPPED | OUTPATIENT
Start: 2023-05-12

## 2023-05-12 NOTE — TELEPHONE ENCOUNTER
Caller: Kelvin Restrepo    Relationship: Self    Best call back number: [phone]    Requested Prescriptions:   Requested Prescriptions     Pending Prescriptions Disp Refills   • metoprolol succinate XL (Toprol XL) 25 MG 24 hr tablet 30 tablet 5     Sig: Take 1 tablet by mouth Daily.        Pharmacy where request should be sent: [facility] -  [place], [place] - [address] [facility] [phone] [facility] [phone] FX     Last office visit with prescribing clinician: 4/20/2023   Last telemedicine visit with prescribing clinician: 4/25/2023   Next office visit with prescribing clinician: 5/31/2023     Additional details provided by patient: PATIENT STATES SHE HAS 7-8 DAYS OF MEDICATION REMAINING. THE [facility] PHARMACY WILL NEED A NEW PRESCRIPTION SINCE PATIENT WILL NOT BE USING [facility] FOR REFILLS.    Does the patient have less than a 3 day supply:  [] Yes  [x] No    Would you like a call back once the refill request has been completed: [] Yes [x] No    If the office needs to give you a call back, can they leave a voicemail: [] Yes [x] No    Rex Mejia   05/12/23 13:22 EDT

## 2023-05-31 ENCOUNTER — OFFICE VISIT (OUTPATIENT)
Dept: FAMILY MEDICINE CLINIC | Facility: CLINIC | Age: 88
End: 2023-05-31

## 2023-05-31 VITALS
RESPIRATION RATE: 18 BRPM | SYSTOLIC BLOOD PRESSURE: 130 MMHG | OXYGEN SATURATION: 96 % | DIASTOLIC BLOOD PRESSURE: 72 MMHG | HEART RATE: 72 BPM | WEIGHT: 152 LBS | HEIGHT: 62 IN | BODY MASS INDEX: 27.97 KG/M2

## 2023-05-31 DIAGNOSIS — E87.1 HYPONATREMIA: ICD-10-CM

## 2023-05-31 DIAGNOSIS — I10 ESSENTIAL HYPERTENSION: Primary | ICD-10-CM

## 2023-05-31 DIAGNOSIS — F41.1 GAD (GENERALIZED ANXIETY DISORDER): ICD-10-CM

## 2023-05-31 DIAGNOSIS — K59.04 CHRONIC IDIOPATHIC CONSTIPATION: ICD-10-CM

## 2023-05-31 DIAGNOSIS — R53.83 OTHER FATIGUE: ICD-10-CM

## 2023-05-31 DIAGNOSIS — E66.3 OVERWEIGHT (BMI 25.0-29.9): ICD-10-CM

## 2023-05-31 PROBLEM — H40.9 GLAUCOMA (INCREASED EYE PRESSURE): Status: ACTIVE | Noted: 2023-05-31

## 2023-05-31 NOTE — PROGRESS NOTES
Subjective   Kelvin Restrepo is a 89 y.o. female. Presents today for   Chief Complaint   Patient presents with   • Heartburn   • Pain   • Constipation   • Anxiety       History of Present Illness  Patient 89 y/o here for f/u of gerd, htn, hyponatremia and constipation;   Had fatigue, low energy and found to have low Na;  Stopped acei and changed just metoprolol with normalization of Na, reports energy level good.  Lats ov restarted citalopram and doing well, susana much improved;  Had started ppi as well for epigastric pain and dyspepsia with great relief;   Started linzess for constipation and working very well;  Cramping/pain gone now.       Review of Systems   Respiratory: Negative for shortness of breath.    Cardiovascular: Negative for chest pain and palpitations.   Gastrointestinal: Positive for abdominal pain. Negative for nausea and vomiting.       Patient Active Problem List   Diagnosis   • Suprapubic pain   • Allergic state   • Chronic constipation   • Gastroesophageal reflux disease   • Fatigue   • Hyperlipidemia   • Hypothyroidism   • Insomnia   • Obstructive sleep apnea syndrome   • Osteoarthritis of multiple joints   • Osteoporosis   • Cobalamin deficiency   • Essential hypertension   • Anxiety   • H/O right mastectomy   • H/O hyperlipidemia   • History of breast cancer   • History of hypothyroidism   • Seasonal allergic rhinitis       Social History     Socioeconomic History   • Marital status:    Tobacco Use   • Smoking status: Never   • Smokeless tobacco: Never   Vaping Use   • Vaping Use: Never used   Substance and Sexual Activity   • Alcohol use: No   • Drug use: No   • Sexual activity: Not Currently     Partners: Male     Birth control/protection: Post-menopausal       Allergies   Allergen Reactions   • Amlodipine      Patient does not recall ever having a reaction to this medication    • Prednisone Hives   • Atorvastatin Myalgia       Current Outpatient Medications on File Prior to Visit    Medication Sig Dispense Refill   • aspirin 325 MG tablet Take 1 tablet by mouth Daily. PLEASE CALL THE OFFICE FOR AN APPT 15 tablet 0   • cetirizine (zyrTEC) 10 MG tablet Take 1 tablet by mouth Daily. 90 tablet 2   • citalopram (CeleXA) 10 MG tablet 1/2 po daily x 14d, then 1po daily 30 tablet 5   • clobetasol (TEMOVATE) 0.05 % ointment Apply  topically to the appropriate area as directed 2 (Two) Times a Day. 60 g 2   • fluticasone (Flonase) 50 MCG/ACT nasal spray 2 sprays into the nostril(s) as directed by provider Daily. 16 g 2   • ketorolac (ACULAR) 0.5 % ophthalmic solution      • levothyroxine (SYNTHROID, LEVOTHROID) 112 MCG tablet Take 1 tablet by mouth Daily. 90 tablet 1   • linaclotide (LINZESS) 72 MCG capsule capsule Take 1 capsule by mouth Every Morning Before Breakfast. 30 capsule 5   • LORazepam (Ativan) 0.5 MG tablet 1 po daily prn anxiety 90 tablet 0   • metoprolol succinate XL (Toprol XL) 25 MG 24 hr tablet Take 1 tablet by mouth Daily. 30 tablet 5   • Multiple Minerals-Vitamins (CALCIUM & VIT D3 BONE HEALTH PO) Take 1 tablet by mouth Daily.     • multivitamin with minerals tablet tablet Take 1 tablet by mouth Daily.     • omeprazole (priLOSEC) 20 MG capsule Take 1 capsule by mouth Daily. 90 capsule 1   • ondansetron ODT (ZOFRAN-ODT) 4 MG disintegrating tablet Place 1 tablet on the tongue Every 8 (Eight) Hours As Needed for Nausea or Vomiting. 24 tablet 0   • Probiotic Product (ALIGN PO) Take  by mouth.     • vitamin B-12 (CYANOCOBALAMIN) 1000 MCG tablet Take 1 tablet by mouth Daily. 90 tablet 3   • vitamin D3 125 MCG (5000 UT) capsule capsule Take 1 capsule by mouth Daily. 90 capsule 3     Current Facility-Administered Medications on File Prior to Visit   Medication Dose Route Frequency Provider Last Rate Last Admin   • cyanocobalamin injection 1,000 mcg  1,000 mcg Intramuscular Q28 Days Leoncio Stoll DO   1,000 mcg at 04/20/23 1003       Objective   Vitals:    05/31/23 1330   BP: 130/72  "  Pulse: 72   Resp: 18   SpO2: 96%   Weight: 68.9 kg (152 lb)   Height: 156.5 cm (61.61\")   PainSc: 0-No pain     Body mass index is 28.15 kg/m².    Physical Exam  Vitals and nursing note reviewed.   Constitutional:       Appearance: She is well-developed.   HENT:      Head: Normocephalic and atraumatic.   Neck:      Thyroid: No thyromegaly.      Vascular: No JVD.   Cardiovascular:      Rate and Rhythm: Normal rate and regular rhythm.      Heart sounds: Normal heart sounds. No murmur heard.    No friction rub. No gallop.   Pulmonary:      Effort: Pulmonary effort is normal. No respiratory distress.      Breath sounds: Normal breath sounds. No wheezing or rales.   Abdominal:      General: Bowel sounds are normal. There is no distension.      Palpations: Abdomen is soft.      Tenderness: There is no abdominal tenderness. There is no guarding or rebound.   Musculoskeletal:      Cervical back: Neck supple.   Skin:     General: Skin is warm and dry.   Neurological:      Mental Status: She is alert.   Psychiatric:         Behavior: Behavior normal.       Component      Latest Ref Rng 4/20/2023   Glucose      65 - 99 mg/dL 76    BUN      8 - 23 mg/dL 14    Creatinine      0.57 - 1.00 mg/dL 0.77    EGFR Result      >60.0 mL/min/1.73 72.5    BUN/Creatinine Ratio      7.0 - 25.0  18.2    Sodium      136 - 145 mmol/L 136    Potassium      3.5 - 5.2 mmol/L 4.8    Chloride      98 - 107 mmol/L 97 (L)    CO2      22.0 - 29.0 mmol/L 27.3    Calcium      8.2 - 9.6 mg/dL 10.1 (H)       (L) Low  (H) High      Assessment & Plan   Diagnoses and all orders for this visit:    1. Essential hypertension (Primary)    2. Chronic idiopathic constipation    3. DAVIN (generalized anxiety disorder)    4. Other fatigue    5. Hyponatremia    6. Overweight (BMI 25.0-29.9)      -hypertension - controlled, continue medications  Hyponatremia resolved off acei;  bp fine  Davin stable now, continue citalopram  Constipation improved - continue linzess   "       BMI is >= 25 and <30. (Overweight) The following options were offered after discussion;: weight loss educational material (shared in after visit summary)     -Follow up: 3 months and prn

## 2023-05-31 NOTE — PATIENT INSTRUCTIONS
Advance Care Planning and Advance Directives     You make decisions on a daily basis - decisions about where you want to live, your career, your home, your life. Perhaps one of the most important decisions you face is your choice for future medical care. Take time to talk with your family and your healthcare team and start planning today.  Advance Care Planning is a process that can help you:  Understand possible future healthcare decisions in light of your own experiences  Reflect on those decision in light of your goals and values  Discuss your decisions with those closest to you and the healthcare professionals that care for you  Make a plan by creating a document that reflects your wishes    Surrogate Decision Maker  In the event of a medical emergency, which has left you unable to communicate or to make your own decisions, you would need someone to make decisions for you.  It is important to discuss your preferences for medical treatment with this person while you are in good health.     Qualities of a surrogate decision maker:  Willing to take on this role and responsibility  Knows what you want for future medical care  Willing to follow your wishes even if they don't agree with them  Able to make difficult medical decisions under stressful circumstances    Advance Directives  These are legal documents you can create that will guide your healthcare team and decision maker(s) when needed. These documents can be stored in the electronic medical record.    Living Will - a legal document to guide your care if you have a terminal condition or a serious illness and are unable to communicate. States vary by statute in document names/types, but most forms may include one or more of the following:        -  Directions regarding life-prolonging treatments        -  Directions regarding artificially provided nutrition/hydration        -  Choosing a healthcare decision maker        -  Direction regarding organ/tissue  donation    Durable Power of  for Healthcare - this document names an -in-fact to make medical decisions for you, but it may also allow this person to make personal and financial decisions for you. Please seek the advice of an  if you need this type of document.    **Advance Directives are not required and no one may discriminate against you if you do not sign one.    Medical Orders  Many states allow specific forms/orders signed by your physician to record your wishes for medical treatment in your current state of health. This form, signed in personal communication with your physician, addresses resuscitation and other medical interventions that you may or may not want.      For more information or to schedule a time with a [facility] Advance Care Planning Facilitator contact: [email or web address] or call [phone] and someone will contact you directly.

## 2023-06-08 DIAGNOSIS — J30.2 SEASONAL ALLERGIC RHINITIS, UNSPECIFIED TRIGGER: ICD-10-CM

## 2023-06-08 RX ORDER — FLUTICASONE PROPIONATE 50 MCG
2 SPRAY, SUSPENSION (ML) NASAL DAILY
Qty: 16 G | Refills: 2 | Status: SHIPPED | OUTPATIENT
Start: 2023-06-08

## 2023-06-08 NOTE — TELEPHONE ENCOUNTER
Caller: Kelvin Restrepo    Relationship: Self    Best call back number: [phone]     Requested Prescriptions:   Requested Prescriptions     Pending Prescriptions Disp Refills    fluticasone (Flonase) 50 MCG/ACT nasal spray 16 g 2     Si sprays into the nostril(s) as directed by provider Daily.        Pharmacy where request should be sent: [id number] - [facility] [address] [facility] [phone] [facility] [phone]      Last office visit with prescribing clinician: 2023   Last telemedicine visit with prescribing clinician: Visit date not found   Next office visit with prescribing clinician: 2023     Additional details provided by patient: PATIENT HAS A REFILL AT [facility] BUT SHE IS NOT GOING DOWN THERE FOR A FEW WEEKS AND WANTS TO KNOW IF SHE CAN GET ONE BOTTLE SENT TO [facility] UNTIL SHE GETS TO [facility]    Does the patient have less than a 3 day supply:  [x] Yes  [] No    Redd Weber Rep   23 11:34 EDT

## 2023-06-13 ENCOUNTER — TELEPHONE (OUTPATIENT)
Dept: FAMILY MEDICINE CLINIC | Facility: CLINIC | Age: 88
End: 2023-06-13

## 2023-06-16 DIAGNOSIS — E03.8 OTHER SPECIFIED HYPOTHYROIDISM: ICD-10-CM

## 2023-06-16 RX ORDER — LEVOTHYROXINE SODIUM 112 UG/1
112 TABLET ORAL DAILY
Qty: 90 TABLET | Refills: 1 | Status: SHIPPED | OUTPATIENT
Start: 2023-06-16

## 2023-06-16 RX ORDER — LEVOTHYROXINE SODIUM 112 UG/1
112 TABLET ORAL DAILY
Qty: 90 TABLET | Refills: 1 | Status: SHIPPED | OUTPATIENT
Start: 2023-06-16 | End: 2023-06-16 | Stop reason: SDUPTHER

## 2023-06-16 NOTE — TELEPHONE ENCOUNTER
Caller: Kelvin Restrepo    Relationship: Self    Best call back number: [phone]    Requested Prescriptions:   Requested Prescriptions     Pending Prescriptions Disp Refills    levothyroxine (SYNTHROID, LEVOTHROID) 112 MCG tablet 90 tablet 1     Sig: Take 1 tablet by mouth Daily.        Pharmacy where request should be sent: [facility] [address] - [phone]  - [phone] FX     Last office visit with prescribing clinician: 5/31/2023   Last telemedicine visit with prescribing clinician: Visit date not found   Next office visit with prescribing clinician: 9/1/2023     Additional details provided by patient:     Does the patient have less than a 3 day supply:  [] Yes  [x] No    Would you like a call back once the refill request has been completed: [] Yes [x] No    If the office needs to give you a call back, can they leave a voicemail: [] Yes [x] No    Redd Leslie   06/16/23 10:25 EDT

## 2023-07-17 NOTE — TELEPHONE ENCOUNTER
pls schedule   Oral Minoxidil Pregnancy And Lactation Text: This medication should only be used when clearly needed if you are pregnant, attempting to become pregnant or breast feeding.

## 2023-08-07 DIAGNOSIS — F41.0 PANIC ATTACK: ICD-10-CM

## 2023-08-07 RX ORDER — LORAZEPAM 0.5 MG/1
TABLET ORAL
Qty: 90 TABLET | Refills: 0 | Status: SHIPPED | OUTPATIENT
Start: 2023-08-07

## 2023-08-07 NOTE — TELEPHONE ENCOUNTER
Caller: Kelvin Restrepo    Relationship: Self    Best call back number: [phone]     Requested Prescriptions:   Requested Prescriptions     Pending Prescriptions Disp Refills    LORazepam (Ativan) 0.5 MG tablet 90 tablet 0     Si po daily prn anxiety        Pharmacy where request should be sent: [facility] [address] [phone] [facility] [phone]      Last office visit with prescribing clinician: 2023   Last telemedicine visit with prescribing clinician: Visit date not found   Next office visit with prescribing clinician: 2023     Additional details provided by patient:     Does the patient have less than a 3 day supply:  [x] Yes  [] No    Would you like a call back once the refill request has been completed: [] Yes [] No    If the office needs to give you a call back, can they leave a voicemail: [] Yes [] No    Redd Gtz   23 11:20 EDT

## 2023-09-14 ENCOUNTER — OFFICE VISIT (OUTPATIENT)
Dept: FAMILY MEDICINE CLINIC | Facility: CLINIC | Age: 88
End: 2023-09-14
Payer: MEDICARE

## 2023-09-14 VITALS
WEIGHT: 144 LBS | SYSTOLIC BLOOD PRESSURE: 132 MMHG | DIASTOLIC BLOOD PRESSURE: 74 MMHG | HEIGHT: 61 IN | OXYGEN SATURATION: 99 % | HEART RATE: 79 BPM | BODY MASS INDEX: 27.19 KG/M2 | RESPIRATION RATE: 16 BRPM | TEMPERATURE: 96.4 F

## 2023-09-14 DIAGNOSIS — Z98.890 HISTORY OF ESOPHAGOGASTRODUODENOSCOPY (EGD): Primary | ICD-10-CM

## 2023-09-14 DIAGNOSIS — R93.7 ABNORMAL BONE DENSITY SCREENING: ICD-10-CM

## 2023-09-14 DIAGNOSIS — R63.4 WEIGHT LOSS: ICD-10-CM

## 2023-09-14 NOTE — PROGRESS NOTES
Subjective   Kelvin Restrepo is a 89 y.o. female. Pt here for fup on abd pain recent scope @ [facility] egd and anxiety     History of Present Illness Lisbeth had been experiencing feelings of food getting caught and being unable to swallow some of her pills.  She even choked once while taking her pills.  She subsequently lost some weight.  Dr. Stoll sent her for an EGD, where they found she had a narrowing in the esophagus.  Gastro performed a dilitation and Emilee reports she is having no difficulties now.    The following portions of the patient's history were reviewed and updated as appropriate: allergies, current medications, past family history, past medical history, past social history, past surgical history, and problem list.    Review of Systems   Constitutional:  Positive for fatigue and unexpected weight change (wt loss).   HENT: Negative.     Eyes: Negative.    Respiratory: Negative.     Cardiovascular: Negative.    Gastrointestinal:  Positive for abdominal pain, constipation and diarrhea.   Endocrine: Negative.    Genitourinary: Negative.    Musculoskeletal: Negative.    Skin: Negative.    Allergic/Immunologic: Negative.    Neurological: Negative.    Hematological: Negative.    Psychiatric/Behavioral:  The patient is nervous/anxious.      Objective   Physical Exam  Constitutional:       Appearance: Normal appearance.   HENT:      Head: Normocephalic and atraumatic.      Mouth/Throat:      Mouth: Mucous membranes are moist.   Eyes:      Pupils: Pupils are equal, round, and reactive to light.   Cardiovascular:      Rate and Rhythm: Normal rate and regular rhythm.   Pulmonary:      Effort: Pulmonary effort is normal.      Breath sounds: Normal breath sounds.   Abdominal:      General: Abdomen is flat.      Palpations: Abdomen is soft.   Musculoskeletal:         General: Normal range of motion.   Skin:     General: Skin is warm.      Capillary Refill: Capillary refill takes less than 2 seconds.   Neurological:       General: No focal deficit present.      Mental Status: She is alert.   Psychiatric:         Mood and Affect: Mood normal.     EGD report shows a dilitation was performed.    Assessment & Plan   Diagnoses and all orders for this visit:    1. History of esophagogastroduodenoscopy (EGD) (Primary)    2. Weight loss    3. Abnormal bone density screening  -     DEXA Bone Density Axial; Future       Discussed weight and BMI.  Encouraged healthy diet and regular exercise.    Discussed Care Gaps, ordered referrals and encouraged vaccination updates.       - Pt agrees with plan of care and denies further questions/concerns today  - This document is intended for medical expert use only. Persons  reading this document without medical staff guidance may result in misinterpretation and unintended morbidity     Go to the ER for any possible life-threatening symptoms such as chest pain or shortness of air.      Please allow 3-5 business days for recommendations based on new results      I personally spent time with this patient, preparing for the visit, reviewing tests, obtaining and/or reviewing a separately obtained history, performing a medically appropriate examination and/or evaluation, counseling and educating the patient/family/caregiver, ordering medications,  documenting information in the medical record and indepentently interpreting results.       Return in about 6 months (around 3/14/2024) for Next scheduled follow up.

## 2023-10-11 ENCOUNTER — OFFICE VISIT (OUTPATIENT)
Dept: FAMILY MEDICINE CLINIC | Facility: CLINIC | Age: 88
End: 2023-10-11
Payer: MEDICARE

## 2023-10-11 VITALS
HEIGHT: 61 IN | OXYGEN SATURATION: 96 % | BODY MASS INDEX: 27 KG/M2 | SYSTOLIC BLOOD PRESSURE: 120 MMHG | RESPIRATION RATE: 16 BRPM | DIASTOLIC BLOOD PRESSURE: 80 MMHG | HEART RATE: 75 BPM | WEIGHT: 143 LBS

## 2023-10-11 DIAGNOSIS — R39.9 UTI SYMPTOMS: Primary | ICD-10-CM

## 2023-10-11 LAB
BILIRUB BLD-MCNC: NEGATIVE MG/DL
CLARITY, POC: CLEAR
COLOR UR: YELLOW
EXPIRATION DATE: NORMAL
GLUCOSE UR STRIP-MCNC: NEGATIVE MG/DL
KETONES UR QL: NEGATIVE
LEUKOCYTE EST, POC: NEGATIVE
Lab: NORMAL
NITRITE UR-MCNC: NEGATIVE MG/ML
PH UR: 6.5 [PH] (ref 5–8)
PROT UR STRIP-MCNC: NEGATIVE MG/DL
RBC # UR STRIP: NEGATIVE /UL
SP GR UR: 1.01 (ref 1–1.03)
UROBILINOGEN UR QL: NORMAL

## 2023-10-11 NOTE — PROGRESS NOTES
"Chief Complaint  Back Pain, Urinary Tract Infection, and Urinary Frequency    Subjective        Kelvin Restrepo presents to [facility]  History of Present Illness    Objective   Vital Signs:  /80   Pulse 75   Resp 16   Ht 154.9 cm (60.98\")   Wt 64.9 kg (143 lb)   SpO2 96%   BMI 27.03 kg/mý   Estimated body mass index is 27.03 kg/mý as calculated from the following:    Height as of this encounter: 154.9 cm (60.98\").    Weight as of this encounter: 64.9 kg (143 lb).               Physical Exam   Result Review :                   Assessment and Plan   There are no diagnoses linked to this encounter.         Follow Up   No follow-ups on file.  Patient was given instructions and counseling regarding her condition or for health maintenance advice. Please see specific information pulled into the AVS if appropriate.         "

## 2023-10-11 NOTE — PROGRESS NOTES
"Subjective   Kelvin Restrepo is a 89 y.o. female.     Chief Complaint   Patient presents with    Back Pain    Urinary Frequency     History of Present Illness     Patient presents today for prior symptoms that she has been experiencing in the past 7 days.  Complains of urinary frequency, lower back pain and suprapubic pain described as \"aching\" and rated 7/10.  However, patient states that her symptoms have resolved today and she is not currently experiencing any urinary frequency.  She states that her back pain has improved and her suprapubic pain has improved, although there is mild tenderness upon palpation.  No history of pyelonephritis.  Patient has history of UTI in 2022.  Denies chills/fever, vaginal discharge, hematuria, urinary hesitancy, nausea/vomiting/diarrhea, possible pregnancy.  She has not tried anything for symptoms.  No history of kidney stones, patient has both kidneys.     The following portions of the patient's history were reviewed and updated as appropriate: allergies, current medications, past family history, past medical history, past social history, past surgical history and problem list.    Review of Systems   Denies dizziness, fatigue, fever/chills, CP, SOA, headache, blood in urine/stool, leg swelling.    Objective     Vitals:    10/11/23 1445   BP: 120/80   Pulse: 75   Resp: 16   SpO2: 96%      Body mass index is 27.03 kg/mý.    Physical Exam  Constitutional:       General: She is not in acute distress.     Appearance: Normal appearance. She is not ill-appearing or toxic-appearing.   HENT:      Right Ear: External ear normal.      Left Ear: External ear normal.      Nose: No congestion or rhinorrhea.      Mouth/Throat:      Mouth: Mucous membranes are moist.      Pharynx: Oropharynx is clear.   Eyes:      Conjunctiva/sclera: Conjunctivae normal.   Cardiovascular:      Rate and Rhythm: Normal rate.      Pulses: Normal pulses.      Heart sounds: Normal heart sounds.   Pulmonary:      " Effort: Pulmonary effort is normal. No respiratory distress.      Breath sounds: Normal breath sounds.   Abdominal:      General: Bowel sounds are normal.      Palpations: Abdomen is soft. There is no mass.      Tenderness: There is abdominal tenderness (mild, suprapubic). There is no right CVA tenderness, left CVA tenderness or guarding.   Skin:     General: Skin is warm and dry.      Capillary Refill: Capillary refill takes less than 2 seconds.   Neurological:      General: No focal deficit present.      Mental Status: She is alert and oriented to person, place, and time.      Motor: No weakness.   Psychiatric:         Behavior: Behavior normal.       POCT urine negative.    Assessment & Plan   Diagnoses and all orders for this visit:    1. UTI symptoms (Primary)  -     POCT urinalysis dipstick, automated      Plan:     Due to resolution of symptoms and negative urinalysis, patient agreeable to watchful waiting, monitoring to see if symptoms return.  Call office or go to ER for symptoms of chest pain, shortness of air, increased abdominal/back pain, increased fever, or any other worsening symptom.  Follow up if symptoms return     Discussed Care Gaps, ordered referrals and encouraged vaccination updates.       - Pt agrees with plan of care and denies further questions/concerns today  - This document is intended for medical expert use only. Persons  reading this document without medical staff guidance may result in misinterpretation and unintended morbidity     Go to the ER for any possible life-threatening symptoms such as chest pain or shortness of air.      Please allow 3-5 business days for recommendations based on new results      I personally spent time with this patient, preparing for the visit, reviewing tests, obtaining and/or reviewing a separately obtained history, performing a medically appropriate examination and/or evaluation, counseling and educating the patient/family/caregiver, ordering  medications,  documenting information in the medical record and indepentently interpreting results.

## 2023-10-16 DIAGNOSIS — I10 ESSENTIAL HYPERTENSION: ICD-10-CM

## 2023-10-16 RX ORDER — METOPROLOL SUCCINATE 25 MG/1
25 TABLET, EXTENDED RELEASE ORAL DAILY
Qty: 30 TABLET | Refills: 0 | Status: SHIPPED | OUTPATIENT
Start: 2023-10-16

## 2023-11-21 DIAGNOSIS — I10 ESSENTIAL HYPERTENSION: ICD-10-CM

## 2023-11-21 RX ORDER — METOPROLOL SUCCINATE 25 MG/1
25 TABLET, EXTENDED RELEASE ORAL DAILY
Qty: 30 TABLET | Refills: 3 | Status: SHIPPED | OUTPATIENT
Start: 2023-11-21

## 2023-11-21 NOTE — TELEPHONE ENCOUNTER
Caller: Kelvin Restrepo    Relationship: Self    Best call back number:     [phone]       Requested Prescriptions:   Requested Prescriptions     Pending Prescriptions Disp Refills    metoprolol succinate XL (TOPROL-XL) 25 MG 24 hr tablet 30 tablet 0     Sig: Take 1 tablet by mouth Daily.        Pharmacy where request should be sent: [id number] - [facility] [address] [facility] [phone] [facility] [phone] FX     Last office visit with prescribing clinician: 7/21/2023   Last telemedicine visit with prescribing clinician: Visit date not found   Next office visit with prescribing clinician: Visit date not found     Additional details provided by patient:     Does the patient have less than a 3 day supply:  [x] Yes  [] No    Would you like a call back once the refill request has been completed: [] Yes [] No    If the office needs to give you a call back, can they leave a voicemail: [] Yes [] No    Redd Gtz Rep   11/21/23 09:26 EST

## 2024-01-22 ENCOUNTER — TELEPHONE (OUTPATIENT)
Dept: FAMILY MEDICINE CLINIC | Facility: CLINIC | Age: 89
End: 2024-01-22

## 2024-01-22 DIAGNOSIS — E03.8 OTHER SPECIFIED HYPOTHYROIDISM: ICD-10-CM

## 2024-01-22 RX ORDER — LEVOTHYROXINE SODIUM 112 UG/1
112 TABLET ORAL DAILY
Qty: 90 TABLET | Refills: 1 | Status: SHIPPED | OUTPATIENT
Start: 2024-01-22

## 2024-01-22 RX ORDER — FAMOTIDINE 40 MG/1
40 TABLET, FILM COATED ORAL NIGHTLY
Qty: 90 TABLET | Refills: 3 | Status: SHIPPED | OUTPATIENT
Start: 2024-01-22

## 2024-01-22 RX ORDER — LANOLIN ALCOHOL/MO/W.PET/CERES
1000 CREAM (GRAM) TOPICAL DAILY
Qty: 90 TABLET | Refills: 3 | Status: SHIPPED | OUTPATIENT
Start: 2024-01-22

## 2024-01-22 NOTE — TELEPHONE ENCOUNTER
Caller: Kelvin Restrepo    Relationship: Self    Best call back number: [phone]     What medication are you requesting: MASTECTOMY BRA    What are your current symptoms:     How long have you been experiencing symptoms:     Have you had these symptoms before:    [x] Yes  [] No    Have you been treated for these symptoms before:   [x] Yes  [] No    If a prescription is needed, what is your preferred pharmacy and phone number:  [facility] FAX: [phone]    Additional notes:

## 2024-01-22 NOTE — TELEPHONE ENCOUNTER
Caller: Kelvin Restrepo    Relationship: Self    Best call back number: [phone]     Requested Prescriptions:   Requested Prescriptions     Pending Prescriptions Disp Refills    levothyroxine (SYNTHROID, LEVOTHROID) 112 MCG tablet 90 tablet 1     Sig: Take 1 tablet by mouth Daily.    vitamin B-12 (CYANOCOBALAMIN) 1000 MCG tablet 90 tablet 3     Sig: Take 1 tablet by mouth Daily.        Pharmacy where request should be sent: [place] [phone] [place] [phone]      Last office visit with prescribing clinician: 7/21/2023   Last telemedicine visit with prescribing clinician: Visit date not found   Next office visit with prescribing clinician: Visit date not found     Additional details provided by patient:     Does the patient have less than a 3 day supply:  [] Yes  [] No    Would you like a call back once the refill request has been completed: [] Yes [] No    If the office needs to give you a call back, can they leave a voicemail: [] Yes [] No    Redd Schroeder Rep   01/22/24 11:23 EST

## 2024-01-22 NOTE — TELEPHONE ENCOUNTER
Caller: Kelvin Restrepo    Relationship: Self    Best call back number: [phone]     What medication are you requesting: FAMOTIDINE 40 MG    What are your current symptoms:     How long have you been experiencing symptoms:     Have you had these symptoms before:    [x] Yes  [] No    Have you been treated for these symptoms before:   [x] Yes  [] No    If a prescription is needed, what is your preferred pharmacy and phone number: [facility] - [address] [phone] [facility] [phone]      Additional notes:

## 2024-06-10 DIAGNOSIS — I10 ESSENTIAL HYPERTENSION: ICD-10-CM

## 2024-06-10 DIAGNOSIS — L90.0 LICHEN SCLEROSUS ET ATROPHICUS: ICD-10-CM

## 2024-06-10 RX ORDER — CLOBETASOL PROPIONATE 0.5 MG/G
OINTMENT TOPICAL 2 TIMES DAILY
Qty: 60 G | Refills: 2 | Status: SHIPPED | OUTPATIENT
Start: 2024-06-10

## 2024-06-10 RX ORDER — METOPROLOL SUCCINATE 25 MG/1
25 TABLET, EXTENDED RELEASE ORAL DAILY
Qty: 30 TABLET | Refills: 2 | Status: SHIPPED | OUTPATIENT
Start: 2024-06-10

## 2024-06-10 NOTE — TELEPHONE ENCOUNTER
Caller: LauroKelvin    Relationship: Self    Best call back number: [phone]     Requested Prescriptions:   Requested Prescriptions     Pending Prescriptions Disp Refills    metoprolol succinate XL (TOPROL-XL) 25 MG 24 hr tablet 30 tablet 2     Sig: Take 1 tablet by mouth Daily.    clobetasol (TEMOVATE) 0.05 % ointment 60 g 2     Sig: Apply  topically to the appropriate area as directed 2 (Two) Times a Day.        Pharmacy where request should be sent: [place] [phone] [place] [phone]      Last office visit with prescribing clinician: 7/21/2023   Last telemedicine visit with prescribing clinician: Visit date not found   Next office visit with prescribing clinician: Visit date not found     Additional details provided by patient: PATIENT IS OUT OF MEDICINES    Does the patient have less than a 3 day supply:  [x] Yes  [] No    Would you like a call back once the refill request has been completed: [x] Yes [] No    If the office needs to give you a call back, can they leave a voicemail: [x] Yes [] No    Redd Cardenas Rep   06/10/24 10:39 EDT

## 2024-06-26 ENCOUNTER — TELEPHONE (OUTPATIENT)
Dept: FAMILY MEDICINE CLINIC | Facility: CLINIC | Age: 89
End: 2024-06-26

## 2024-06-26 NOTE — TELEPHONE ENCOUNTER
Caller: Kelvin Restrepo    Relationship to patient: Self    Best call back number: [phone]    Patient is needing: SHE HAS A PAPER TO BE SIGNED AND WANTED TO KNOW IF SHE CAN DROP IT OFF FOR  TO SIGN. IT IS TO GIVE HER FRIEND, VEL PERMISSION TO GO IN TO THE [facility] AND SHOP FOR HER. SHE IS JUST NOT ABLE TO DO IT ANY MORE.

## 2024-07-22 DIAGNOSIS — E03.8 OTHER SPECIFIED HYPOTHYROIDISM: ICD-10-CM

## 2024-07-22 RX ORDER — LEVOTHYROXINE SODIUM 112 UG/1
112 TABLET ORAL DAILY
Qty: 30 TABLET | Refills: 0 | Status: SHIPPED | OUTPATIENT
Start: 2024-07-22

## 2024-07-22 NOTE — TELEPHONE ENCOUNTER
Caller: Kelvin Restrepo    Relationship: Self    Best call back number: [phone]     Requested Prescriptions:   Requested Prescriptions     Pending Prescriptions Disp Refills    levothyroxine (SYNTHROID, LEVOTHROID) 112 MCG tablet 90 tablet 1     Sig: Take 1 tablet by mouth Daily.        Pharmacy where request should be sent: [address] [phone] [facility] [phone]      Last office visit with prescribing clinician: 7/21/2023   Last telemedicine visit with prescribing clinician: Visit date not found   Next office visit with prescribing clinician: Visit date not found     Additional details provided by patient: 5-6 PILLS LEFT    Does the patient have less than a 3 day supply:  [] Yes  [x] No    Would you like a call back once the refill request has been completed: [] Yes [x] No    If the office needs to give you a call back, can they leave a voicemail: [] Yes [x] No    Redd Rosales Rep   07/22/24 14:02 EDT

## 2024-07-26 ENCOUNTER — TELEPHONE (OUTPATIENT)
Dept: FAMILY MEDICINE CLINIC | Facility: CLINIC | Age: 89
End: 2024-07-26
Payer: MEDICARE

## 2024-07-26 DIAGNOSIS — R39.9 UTI SYMPTOMS: Primary | ICD-10-CM

## 2024-07-26 RX ORDER — CEPHALEXIN 500 MG/1
500 CAPSULE ORAL 2 TIMES DAILY
Qty: 14 CAPSULE | Refills: 0 | Status: SHIPPED | OUTPATIENT
Start: 2024-07-26

## 2024-07-26 NOTE — TELEPHONE ENCOUNTER
Pt thinks she has UTI and is asking if you will send med to pharmacy for her. She has painful, burning with urination and difficulty urination. Pt is going to come in to leave urine sample for U/A w/micro/culture

## 2024-08-02 ENCOUNTER — OFFICE VISIT (OUTPATIENT)
Dept: FAMILY MEDICINE CLINIC | Facility: CLINIC | Age: 89
End: 2024-08-02
Payer: MEDICARE

## 2024-08-02 VITALS
DIASTOLIC BLOOD PRESSURE: 89 MMHG | WEIGHT: 145 LBS | HEART RATE: 66 BPM | BODY MASS INDEX: 28.47 KG/M2 | OXYGEN SATURATION: 96 % | HEIGHT: 60 IN | SYSTOLIC BLOOD PRESSURE: 139 MMHG

## 2024-08-02 DIAGNOSIS — I10 ESSENTIAL HYPERTENSION: ICD-10-CM

## 2024-08-02 DIAGNOSIS — E66.3 OVERWEIGHT (BMI 25.0-29.9): ICD-10-CM

## 2024-08-02 DIAGNOSIS — Z00.00 MEDICARE ANNUAL WELLNESS VISIT, SUBSEQUENT: Primary | ICD-10-CM

## 2024-08-02 DIAGNOSIS — E03.8 OTHER SPECIFIED HYPOTHYROIDISM: ICD-10-CM

## 2024-08-02 DIAGNOSIS — F41.0 PANIC ATTACK: ICD-10-CM

## 2024-08-02 DIAGNOSIS — R53.83 OTHER FATIGUE: ICD-10-CM

## 2024-08-02 RX ORDER — LORAZEPAM 0.5 MG/1
TABLET ORAL
Qty: 90 TABLET | Refills: 2 | Status: SHIPPED | OUTPATIENT
Start: 2024-08-02

## 2024-08-02 RX ORDER — CITALOPRAM HYDROBROMIDE 10 MG/1
10 TABLET ORAL DAILY
COMMUNITY

## 2024-08-02 RX ORDER — PANTOPRAZOLE SODIUM 40 MG/1
1 TABLET, DELAYED RELEASE ORAL DAILY
COMMUNITY
Start: 2024-06-10 | End: 2024-08-02

## 2024-08-02 RX ORDER — LORAZEPAM 0.5 MG/1
TABLET ORAL
Qty: 90 TABLET | Refills: 2 | Status: SHIPPED | OUTPATIENT
Start: 2024-08-02 | End: 2024-08-02 | Stop reason: SDUPTHER

## 2024-08-02 NOTE — PROGRESS NOTES
QUICK REFERENCE INFORMATION:  The ABCs of the Annual Wellness Visit    subsequent Medicare Wellness Visit    HEALTH RISK ASSESSMENT    3/19/1931  Kelvin Restrepo is a 89 y.o. female who presents for an subsequent Wellness Visit.    The following portions of the patient's history were reviewed and   updated as appropriate: allergies, current medications, past family history, past medical history, past social history, past surgical history, and problem list.    Compared to one year ago, the patient feels his physical   health is the same.    Compared to one year ago, the patient feels his mental   health is the same.    Recent Hospitalizations:  She was not admitted to the hospital during the last year.     Current Medical Providers:  Patient Care Team:  Leoncio Stoll DO as PCP - General  Elizabeth López APRN as Nurse Practitioner (Gastroenterology)    I reviewed list of current Medical providers and suppliers with patient and are listed above to the best of the patient's and my knowledge.    Smoking Status:  Social History     Tobacco Use   Smoking Status Never   Smokeless Tobacco Never       Alcohol Consumption:  Social History     Substance and Sexual Activity   Alcohol Use No       Depression Screen:       2024     1:00 PM   PHQ-2/PHQ-9 Depression Screening   Little Interest or Pleasure in Doing Things 0-->not at all   Feeling Down, Depressed or Hopeless 0-->not at all   PHQ-9: Brief Depression Severity Measure Score 0       Health Habits and Functional and Cognitive Screenin/2/2024     1:00 PM   Functional & Cognitive Status   Do you have difficulty preparing food and eating? No   Do you have difficulty bathing yourself, getting dressed or grooming yourself? No   Do you have difficulty using the toilet? No   Do you have difficulty moving around from place to place? No   Do you have trouble with steps or getting out of a bed or a chair? No   Current Diet Well Balanced Diet   Dental Exam Up to  date   Eye Exam Up to date   Exercise (times per week) 2 times per week   Current Exercises Include House Cleaning   Do you need help using the phone?  No   Are you deaf or do you have serious difficulty hearing?  No   Do you need help to go to places out of walking distance? No   Do you need help shopping? No   Do you need help preparing meals?  No   Do you need help with housework?  No   Do you need help with laundry? No   Do you need help taking your medications? No   Do you need help managing money? No   Do you ever drive or ride in a car without wearing a seat belt? No   Have you felt unusual stress, anger or loneliness in the last month? No   Who do you live with? Alone   If you need help, do you have trouble finding someone available to you? No   Have you been bothered in the last four weeks by sexual problems? No   Do you have difficulty concentrating, remembering or making decisions? No       Does the patient have evidence of cognitive impairment? No    Aspirin use counseling: Does not need ASA (and currently is not on it)    Recent Lab Results:  CMP:  Lab Results   Component Value Date    BUN 14 04/20/2023    CREATININE 0.77 04/20/2023    EGFRIFNONA 63 11/09/2021    EGFRIFAFRI 73 11/09/2021    BCR 18.2 04/20/2023     04/20/2023    K 4.8 04/20/2023    CO2 27.3 04/20/2023    CALCIUM 10.1 (H) 04/20/2023    PROTENTOTREF 6.7 04/13/2023    ALBUMIN 4.3 04/13/2023    LABGLOBREF 2.4 04/13/2023    LABIL2 1.8 04/13/2023    BILITOT 0.3 04/13/2023    ALKPHOS 73 04/13/2023    AST 19 04/13/2023    ALT 16 04/13/2023     Lipid Panel:  Lab Results   Component Value Date    TRIG 265 (H) 04/13/2023    HDL 37 (L) 04/13/2023    VLDL 48 (H) 04/13/2023     HbA1c:  Lab Results   Component Value Date    HGBA1C 5.8 (H) 04/13/2023       Visual Acuity:  No results found.    Age-appropriate Screening Schedule:  Refer to the list below for future screening recommendations based on patient's age, sex and/or medical conditions.  Orders for these recommended tests are listed in the plan section. The patient has been provided with a written plan.    Health Maintenance   Topic Date Due    DXA SCAN  04/27/2023    LIPID PANEL  04/13/2024    INFLUENZA VACCINE  08/01/2024    COVID-19 Vaccine (1 - 2023-24 season) 10/22/2024 (Originally 9/1/2023)    RSV Vaccine - Adults (1 - 1-dose 60+ series) 08/02/2025 (Originally 3/19/1991)    ANNUAL WELLNESS VISIT  08/02/2025    BMI FOLLOWUP  08/02/2025    TDAP/TD VACCINES (2 - Td or Tdap) 09/15/2026    Pneumococcal Vaccine 65+  Completed          Outpatient Medications Prior to Visit   Medication Sig Dispense Refill    aspirin 325 MG tablet Take 1 tablet by mouth Daily. PLEASE CALL THE OFFICE FOR AN APPT 15 tablet 0    citalopram (CeleXA) 10 MG tablet Take 1 tablet by mouth Daily.      famotidine (PEPCID) 40 MG tablet Take 1 tablet by mouth Every Night. 90 tablet 3    fluticasone (FLONASE) 50 MCG/ACT nasal spray 2 sprays into the nostril(s) as directed by provider Daily. 48 g 3    ketorolac (ACULAR) 0.5 % ophthalmic solution       levothyroxine (SYNTHROID, LEVOTHROID) 112 MCG tablet Take 1 tablet by mouth Daily. 30 tablet 0    metoprolol succinate XL (TOPROL-XL) 25 MG 24 hr tablet Take 1 tablet by mouth Daily. 30 tablet 2    vitamin B-12 (CYANOCOBALAMIN) 1000 MCG tablet Take 1 tablet by mouth Daily. 90 tablet 3    vitamin D3 125 MCG (5000 UT) capsule capsule Take 1 capsule by mouth Daily. 90 capsule 3    LORazepam (Ativan) 0.5 MG tablet 1 po daily prn anxiety 90 tablet 0    cephalexin (Keflex) 500 MG capsule Take 1 capsule by mouth 2 (Two) Times a Day. (Patient not taking: Reported on 8/2/2024) 14 capsule 0    clobetasol (TEMOVATE) 0.05 % ointment Apply  topically to the appropriate area as directed 2 (Two) Times a Day. (Patient not taking: Reported on 8/2/2024) 60 g 2    pantoprazole (PROTONIX) 40 MG EC tablet Take 1 tablet by mouth Daily. (Patient not taking: Reported on 8/2/2024)      sucralfate (Carafate) 1  g tablet Dissolve 1 tab in 8 ounces water 3 times a day before meals (Patient not taking: Reported on 8/2/2024) 270 tablet 3     Facility-Administered Medications Prior to Visit   Medication Dose Route Frequency Provider Last Rate Last Admin    cyanocobalamin injection 1,000 mcg  1,000 mcg Intramuscular Q28 Days Leoncio Stoll    1,000 mcg at 04/20/23 1003       Patient Active Problem List   Diagnosis    Suprapubic pain    Allergic state    Chronic constipation    Gastroesophageal reflux disease    Fatigue    Hyperlipidemia    Hypothyroidism    Insomnia    Obstructive sleep apnea syndrome    Osteoarthritis of multiple joints    Osteoporosis    Cobalamin deficiency    Essential hypertension    Anxiety    H/O right mastectomy    H/O hyperlipidemia    History of breast cancer    History of hypothyroidism    Seasonal allergic rhinitis    Glaucoma (increased eye pressure)       Advance Care Planning:  ACP discussion was held with the patient during this visit. Patient does not have an advance directive, information provided.    Identification of Risk Factors:  Risk factors include: Obesity/Overweight .    Review of Systems   Constitutional:  Positive for fatigue.   Respiratory:  Negative for shortness of breath.    Cardiovascular:  Negative for chest pain and palpitations.   Gastrointestinal:  Negative for abdominal pain.       Objective     Physical Exam  Vitals and nursing note reviewed.   Constitutional:       Appearance: She is well-developed.   HENT:      Head: Normocephalic and atraumatic.   Neck:      Thyroid: No thyromegaly.      Vascular: No JVD.   Cardiovascular:      Rate and Rhythm: Normal rate and regular rhythm.      Heart sounds: Normal heart sounds. No murmur heard.     No friction rub. No gallop.   Pulmonary:      Effort: Pulmonary effort is normal. No respiratory distress.      Breath sounds: Normal breath sounds. No wheezing or rales.   Abdominal:      General: Bowel sounds are normal. There is  "no distension.      Palpations: Abdomen is soft.      Tenderness: There is no abdominal tenderness. There is no guarding or rebound.   Musculoskeletal:      Cervical back: Neck supple.   Skin:     General: Skin is warm and dry.   Neurological:      Mental Status: She is alert.      Gait: Gait normal.   Psychiatric:         Behavior: Behavior normal.         Vitals:    08/02/24 1317 08/02/24 1409   BP: 156/94 139/89   Pulse: 66    SpO2: 96%    Weight: 65.8 kg (145 lb)    Height: 152.4 cm (60\")    PainSc: 0-No pain      Body mass index is 28.32 kg/m².    BMI is >= 25 and <30. (Overweight) The following options were offered after discussion;: weight loss educational material (shared in after visit summary)      Assessment & Plan   Patient Self-Management and Personalized Health Advice  The patient has been provided with information about: diet and exercise and preventive services including:   Annual Wellness Visit (AWV).    Visit Diagnoses:    ICD-10-CM ICD-9-CM   1. Medicare annual wellness visit, subsequent  Z00.00 V70.0   2. Essential hypertension  I10 401.9   3. Other specified hypothyroidism  E03.8 244.8   4. Other fatigue  R53.83 780.79   5. Overweight (BMI 25.0-29.9)  E66.3 278.02   6. Panic attack  F41.0 300.01       Orders Placed This Encounter   Procedures    CBC (No Diff)     Order Specific Question:   Release to patient     Answer:   Routine Release [[id number]]    Comprehensive Metabolic Panel     Order Specific Question:   Release to patient     Answer:   Routine Release [[id number]]    TSH     Order Specific Question:   Release to patient     Answer:   Routine Release [[id number]]    T4, Free     Order Specific Question:   Release to patient     Answer:   Routine Release [[id number]]    Vitamin B12     Order Specific Question:   Release to patient     Answer:   Routine Release [[id number]]       Outpatient Encounter Medications as of 8/2/2024   Medication Sig Dispense Refill    aspirin 325 MG tablet Take " 1 tablet by mouth Daily. PLEASE CALL THE OFFICE FOR AN APPT 15 tablet 0    citalopram (CeleXA) 10 MG tablet Take 1 tablet by mouth Daily.      famotidine (PEPCID) 40 MG tablet Take 1 tablet by mouth Every Night. 90 tablet 3    fluticasone (FLONASE) 50 MCG/ACT nasal spray 2 sprays into the nostril(s) as directed by provider Daily. 48 g 3    ketorolac (ACULAR) 0.5 % ophthalmic solution       levothyroxine (SYNTHROID, LEVOTHROID) 112 MCG tablet Take 1 tablet by mouth Daily. 30 tablet 0    LORazepam (Ativan) 0.5 MG tablet 1 po daily prn anxiety 90 tablet 2    metoprolol succinate XL (TOPROL-XL) 25 MG 24 hr tablet Take 1 tablet by mouth Daily. 30 tablet 2    vitamin B-12 (CYANOCOBALAMIN) 1000 MCG tablet Take 1 tablet by mouth Daily. 90 tablet 3    vitamin D3 125 MCG (5000 UT) capsule capsule Take 1 capsule by mouth Daily. 90 capsule 3    [DISCONTINUED] LORazepam (Ativan) 0.5 MG tablet 1 po daily prn anxiety 90 tablet 0    [DISCONTINUED] LORazepam (Ativan) 0.5 MG tablet 1 po daily prn anxiety 90 tablet 2    [DISCONTINUED] cephalexin (Keflex) 500 MG capsule Take 1 capsule by mouth 2 (Two) Times a Day. (Patient not taking: Reported on 8/2/2024) 14 capsule 0    [DISCONTINUED] clobetasol (TEMOVATE) 0.05 % ointment Apply  topically to the appropriate area as directed 2 (Two) Times a Day. (Patient not taking: Reported on 8/2/2024) 60 g 2    [DISCONTINUED] pantoprazole (PROTONIX) 40 MG EC tablet Take 1 tablet by mouth Daily. (Patient not taking: Reported on 8/2/2024)      [DISCONTINUED] sucralfate (Carafate) 1 g tablet Dissolve 1 tab in 8 ounces water 3 times a day before meals (Patient not taking: Reported on 8/2/2024) 270 tablet 3     Facility-Administered Encounter Medications as of 8/2/2024   Medication Dose Route Frequency Provider Last Rate Last Admin    cyanocobalamin injection 1,000 mcg  1,000 mcg Intramuscular Q28 Days Leoncio Stoll DO   1,000 mcg at 04/20/23 1003       Reviewed use of high risk medication in the  elderly: yes  Reviewed for potential of harmful drug interactions in the elderly: yes  No opioid medication identified on active medication list. I have reviewed chart for other potential  high risk medication/s and harmful drug interactions in the elderly.    Social History     Socioeconomic History    Marital status:    Tobacco Use    Smoking status: Never    Smokeless tobacco: Never   Vaping Use    Vaping status: Never Used   Substance and Sexual Activity    Alcohol use: No    Drug use: No    Sexual activity: Not Currently     Partners: Male     Birth control/protection: Post-menopausal     Patient was screened for OUD and SANTY risk factors.  Kelvin Restrepo's pain level was assessed as well today.  I included a review current recommendations on pain management, best use practices, current CDC guidelines, alternatives to opioids including OTC & Rx topicals, non-opioid oral medications (eg nsaids, acetominophen) and life style interventions such as yoga, caitlin chi, stretching, regular exercise, PT/OT and referral to specialty care like Pain Management that specialize in pain treatment when appropriate.   I also included a review of serious risks of opioid use and substance use disorder.  I have included all of this in the after visit summary along with other risk factors identified that are pertinent to the patient today.      Follow Up:  Return if symptoms worsen or fail to improve.     An After Visit Summary and PPPS with all of these plans were given to the patient.           ++++++++++++++++++++++++++++++++++++++++++++++++++++++++++++++++++   Additional E&M Note during same encounter follows:  Patient has multiple medical problems which are significant and separately identifiable that require additional work above and beyond the Medicare Wellness Visit.   Chief Complaint   Patient presents with    Medicare Wellness-subsequent    Anxiety    Hypothyroidism    Hypertension     HPI  Patient 89y/o with htn, susana  "and hypothyoridism;  due labs;  fatigued better;  no cp/soa; was haivng uti symptoms but went away and did not take keflex as felt too big to swallow;  had egd and had stricture, dilated with improvement.  Doing ok;   make note   Review of Systems   Constitutional: Positive for fatigue.   Cardiovascular:  Negative for chest pain and palpitations.   Respiratory:  Negative for shortness of breath.    Gastrointestinal:  Negative for abdominal pain.       Social History     Tobacco Use    Smoking status: Never    Smokeless tobacco: Never   Substance Use Topics    Alcohol use: No     O:   Vitals:    08/02/24 1317 08/02/24 1409   BP: 156/94 139/89   Pulse: 66    SpO2: 96%    Weight: 65.8 kg (145 lb)    Height: 152.4 cm (60\")    PainSc: 0-No pain      Body mass index is 28.32 kg/m².  Vitals and nursing note reviewed.   Constitutional:       Appearance: Well-developed.   HENT:      Head: Normocephalic and atraumatic.   Neck:      Thyroid: No thyromegaly.      Vascular: No JVD.   Pulmonary:      Effort: Pulmonary effort is normal. No respiratory distress.      Breath sounds: Normal breath sounds. No wheezing. No rales.   Cardiovascular:      Normal rate. Regular rhythm. Normal heart sounds.      No gallop.  No friction rub.   Abdominal:      General: Bowel sounds are normal. There is no distension.      Palpations: Abdomen is soft.      Tenderness: There is no abdominal tenderness. There is no guarding or rebound.   Musculoskeletal:      Cervical back: Neck supple. Skin:     General: Skin is warm and dry.   Neurological:      Mental Status: Alert.      Gait: Gait normal.   Psychiatric:         Behavior: Behavior normal.         Diagnoses and all orders for this visit:    1. Medicare annual wellness visit, subsequent (Primary)    2. Essential hypertension    3. Other specified hypothyroidism  -     TSH  -     T4, Free    4. Other fatigue  -     CBC (No Diff)  -     Comprehensive Metabolic Panel  -     TSH  -     T4, Free  -   "   Vitamin B12    5. Overweight (BMI 25.0-29.9)    6. Panic attack  -     Discontinue: LORazepam (Ativan) 0.5 MG tablet; 1 po daily prn anxiety  Dispense: 90 tablet; Refill: 2  -     LORazepam (Ativan) 0.5 MG tablet; 1 po daily prn anxiety  Dispense: 90 tablet; Refill: 2    Caution with bzd use as leads to significant falls; on for many years and reports using sparingly.  Gave rx and sent  Fatigue - recheck labs   -hypertension - controlled, continue medications  -hypothyroidism - continue medication, recheck thyroid labs.      Return if symptoms worsen or fail to improve.

## 2024-08-02 NOTE — PATIENT INSTRUCTIONS
Advance Care Planning and Advance Directives     You make decisions on a daily basis - decisions about where you want to live, your career, your home, your life. Perhaps one of the most important decisions you face is your choice for future medical care. Take time to talk with your family and your healthcare team and start planning today.  Advance Care Planning is a process that can help you:  Understand possible future healthcare decisions in light of your own experiences  Reflect on those decision in light of your goals and values  Discuss your decisions with those closest to you and the healthcare professionals that care for you  Make a plan by creating a document that reflects your wishes    Surrogate Decision Maker  In the event of a medical emergency, which has left you unable to communicate or to make your own decisions, you would need someone to make decisions for you.  It is important to discuss your preferences for medical treatment with this person while you are in good health.     Qualities of a surrogate decision maker:  Willing to take on this role and responsibility  Knows what you want for future medical care  Willing to follow your wishes even if they don't agree with them  Able to make difficult medical decisions under stressful circumstances    Advance Directives  These are legal documents you can create that will guide your healthcare team and decision maker(s) when needed. These documents can be stored in the electronic medical record.    Living Will - a legal document to guide your care if you have a terminal condition or a serious illness and are unable to communicate. States vary by statute in document names/types, but most forms may include one or more of the following:        -  Directions regarding life-prolonging treatments        -  Directions regarding artificially provided nutrition/hydration        -  Choosing a healthcare decision maker        -  Direction regarding organ/tissue  donation    Durable Power of  for Healthcare - this document names an -in-fact to make medical decisions for you, but it may also allow this person to make personal and financial decisions for you. Please seek the advice of an  if you need this type of document.    **Advance Directives are not required and no one may discriminate against you if you do not sign one.    Medical Orders  Many states allow specific forms/orders signed by your physician to record your wishes for medical treatment in your current state of health. This form, signed in personal communication with your physician, addresses resuscitation and other medical interventions that you may or may not want.      For more information or to schedule a time with a [facility] Advance Care Planning Facilitator contact: [facility] or call [phone] and someone will contact you directly.Calorie Counting for Weight Loss  Calories are units of energy. Your body needs a certain number of calories from food to keep going throughout the day. When you eat or drink more calories than your body needs, your body stores the extra calories mostly as fat. When you eat or drink fewer calories than your body needs, your body burns fat to get the energy it needs.  Calorie counting means keeping track of how many calories you eat and drink each day. Calorie counting can be helpful if you need to lose weight. If you eat fewer calories than your body needs, you should lose weight. Ask your health care provider what a healthy weight is for you.  For calorie counting to work, you will need to eat the right number of calories each day to lose a healthy amount of weight per week. A dietitian can help you figure out how many calories you need in a day and will suggest ways to reach your calorie goal.  A healthy amount of weight to lose each week is usually 1-2 lb (0.5-0.9 kg). This usually means that your daily calorie intake should be  reduced by 500-750 calories.  Eating 1,200-1,500 calories a day can help most women lose weight.  Eating 1,500-1,800 calories a day can help most men lose weight.  What do I need to know about calorie counting?  Work with your health care provider or dietitian to determine how many calories you should get each day. To meet your daily calorie goal, you will need to:  Find out how many calories are in each food that you would like to eat. Try to do this before you eat.  Decide how much of the food you plan to eat.  Keep a food log. Do this by writing down what you ate and how many calories it had.  To successfully lose weight, it is important to balance calorie counting with a healthy lifestyle that includes regular activity.  Where do I find calorie information?  The number of calories in a food can be found on a Nutrition Facts label. If a food does not have a Nutrition Facts label, try to look up the calories online or ask your dietitian for help.  Remember that calories are listed per serving. If you choose to have more than one serving of a food, you will have to multiply the calories per serving by the number of servings you plan to eat. For example, the label on a package of bread might say that a serving size is 1 slice and that there are 90 calories in a serving. If you eat 1 slice, you will have eaten 90 calories. If you eat 2 slices, you will have eaten 180 calories.  How do I keep a food log?  After each time that you eat, record the following in your food log as soon as possible:  What you ate. Be sure to include toppings, sauces, and other extras on the food.  How much you ate. This can be measured in cups, ounces, or number of items.  How many calories were in each food and drink.  The total number of calories in the food you ate.  Keep your food log near you, such as in a pocket-sized notebook or on an te or website on your mobile phone. Some programs will calculate calories for you and show you how  many calories you have left to meet your daily goal.  What are some portion-control tips?  Know how many calories are in a serving. This will help you know how many servings you can have of a certain food.  Use a measuring cup to measure serving sizes. You could also try weighing out portions on a kitchen scale. With time, you will be able to estimate serving sizes for some foods.  Take time to put servings of different foods on your favorite plates or in your favorite bowls and cups so you know what a serving looks like.  Try not to eat straight from a food's packaging, such as from a bag or box. Eating straight from the package makes it hard to see how much you are eating and can lead to overeating. Put the amount you would like to eat in a cup or on a plate to make sure you are eating the right portion.  Use smaller plates, glasses, and bowls for smaller portions and to prevent overeating.  Try not to multitask. For example, avoid watching TV or using your computer while eating. If it is time to eat, sit down at a table and enjoy your food. This will help you recognize when you are full. It will also help you be more mindful of what and how much you are eating.  What are tips for following this plan?  Reading food labels  Check the calorie count compared with the serving size. The serving size may be smaller than what you are used to eating.  Check the source of the calories. Try to choose foods that are high in protein, fiber, and vitamins, and low in saturated fat, trans fat, and sodium.  Shopping  Read nutrition labels while you shop. This will help you make healthy decisions about which foods to buy.  Pay attention to nutrition labels for low-fat or fat-free foods. These foods sometimes have the same number of calories or more calories than the full-fat versions. They also often have added sugar, starch, or salt to make up for flavor that was removed with the fat.  Make a grocery list of lower-calorie foods  and stick to it.  Cooking  Try to cook your favorite foods in a healthier way. For example, try baking instead of frying.  Use low-fat dairy products.  Meal planning  Use more fruits and vegetables. One-half of your plate should be fruits and vegetables.  Include lean proteins, such as chicken, turkey, and fish.  Lifestyle  Each week, aim to do one of the followin minutes of moderate exercise, such as walking.  75 minutes of vigorous exercise, such as running.  General information  Know how many calories are in the foods you eat most often. This will help you calculate calorie counts faster.  Find a way of tracking calories that works for you. Get creative. Try different apps or programs if writing down calories does not work for you.  What foods should I eat?    Eat nutritious foods. It is better to have a nutritious, high-calorie food, such as an avocado, than a food with few nutrients, such as a bag of potato chips.  Use your calories on foods and drinks that will fill you up and will not leave you hungry soon after eating.  Examples of foods that fill you up are nuts and nut butters, vegetables, lean proteins, and high-fiber foods such as whole grains. High-fiber foods are foods with more than 5 g of fiber per serving.  Pay attention to calories in drinks. Low-calorie drinks include water and unsweetened drinks.  The items listed above may not be a complete list of foods and beverages you can eat. Contact a dietitian for more information.  What foods should I limit?  Limit foods or drinks that are not good sources of vitamins, minerals, or protein or that are high in unhealthy fats. These include:  Candy.  Other sweets.  Sodas, specialty coffee drinks, alcohol, and juice.  The items listed above may not be a complete list of foods and beverages you should avoid. Contact a dietitian for more information.  How do I count calories when eating out?  Pay attention to portions. Often, portions are much larger  when eating out. Try these tips to keep portions smaller:  Consider sharing a meal instead of getting your own.  If you get your own meal, eat only half of it. Before you start eating, ask for a container and put half of your meal into it.  When available, consider ordering smaller portions from the menu instead of full portions.  Pay attention to your food and drink choices. Knowing the way food is cooked and what is included with the meal can help you eat fewer calories.  If calories are listed on the menu, choose the lower-calorie options.  Choose dishes that include vegetables, fruits, whole grains, low-fat dairy products, and lean proteins.  Choose items that are boiled, broiled, grilled, or steamed. Avoid items that are buttered, battered, fried, or served with cream sauce. Items labeled as crispy are usually fried, unless stated otherwise.  Choose water, low-fat milk, unsweetened iced tea, or other drinks without added sugar. If you want an alcoholic beverage, choose a lower-calorie option, such as a glass of wine or light beer.  Ask for dressings, sauces, and syrups on the side. These are usually high in calories, so you should limit the amount you eat.  If you want a salad, choose a garden salad and ask for grilled meats. Avoid extra toppings such as jay, cheese, or fried items. Ask for the dressing on the side, or ask for olive oil and vinegar or lemon to use as dressing.  Estimate how many servings of a food you are given. Knowing serving sizes will help you be aware of how much food you are eating at restaurants.  Where to find more information  Centers for Disease Control and Prevention: www.cdc.gov  [place] Department of Agriculture: myplate.gov  Summary  Calorie counting means keeping track of how many calories you eat and drink each day. If you eat fewer calories than your body needs, you should lose weight.  A healthy amount of weight to lose per week is usually 1-2 lb (0.5-0.9 kg). This usually  means reducing your daily calorie intake by 500-750 calories.  The number of calories in a food can be found on a Nutrition Facts label. If a food does not have a Nutrition Facts label, try to look up the calories online or ask your dietitian for help.  Use smaller plates, glasses, and bowls for smaller portions and to prevent overeating.  Use your calories on foods and drinks that will fill you up and not leave you hungry shortly after a meal.  This information is not intended to replace advice given to you by your health care provider. Make sure you discuss any questions you have with your health care provider.  Document Revised: 01/28/2021 Document Reviewed: 01/28/2021  [employer] Patient Education © 2022 [employer]    Exercising to Lose Weight  Getting regular exercise is important for everyone. It is especially important if you are overweight. Being overweight increases your risk of heart disease, stroke, diabetes, high blood pressure, and several types of cancer. Exercising, and reducing the calories you consume, can help you lose weight and improve fitness and health.  Exercise can be moderate or vigorous intensity. To lose weight, most people need to do a certain amount of moderate or vigorous-intensity exercise each week.  How can exercise affect me?  You lose weight when you exercise enough to burn more calories than you eat. Exercise also reduces body fat and builds muscle. The more muscle you have, the more calories you burn. Exercise also:  Improves mood.  Reduces stress and tension.  Improves your overall fitness, flexibility, and endurance.  Increases bone strength.  Moderate-intensity exercise  Moderate-intensity exercise is any activity that gets you moving enough to burn at least three times more energy (calories) than if you were sitting.  Examples of moderate exercise include:  Walking a mile in 15 minutes.  Doing light yard work.  Biking at an easy pace.  Most people should get at least 150  minutes of moderate-intensity exercise a week to maintain their body weight.  Vigorous-intensity exercise  Vigorous-intensity exercise is any activity that gets you moving enough to burn at least six times more calories than if you were sitting. When you exercise at this intensity, you should be working hard enough that you are not able to carry on a conversation.  Examples of vigorous exercise include:  Running.  Playing a team sport, such as football, basketball, and soccer.  Jumping rope.  Most people should get at least 75 minutes a week of vigorous exercise to maintain their body weight.  What actions can I take to lose weight?  The amount of exercise you need to lose weight depends on:  Your age.  The type of exercise.  Any health conditions you have.  Your overall physical ability.  Talk to your health care provider about how much exercise you need and what types of activities are safe for you.  Nutrition    Make changes to your diet as told by your health care provider or diet and nutrition specialist (dietitian). This may include:  Eating fewer calories.  Eating more protein.  Eating less unhealthy fats.  Eating a diet that includes fresh fruits and vegetables, whole grains, low-fat dairy products, and lean protein.  Avoiding foods with added fat, salt, and sugar.  Drink plenty of water while you exercise to prevent dehydration or heat stroke.  Activity  Choose an activity that you enjoy and set realistic goals. Your health care provider can help you make an exercise plan that works for you.  Exercise at a moderate or vigorous intensity most days of the week.  The intensity of exercise may vary from person to person. You can tell how intense a workout is for you by paying attention to your breathing and heartbeat. Most people will notice their breathing and heartbeat get faster with more intense exercise.  Do resistance training twice each week, such as:  Push-ups.  Sit-ups.  Lifting weights.  Using  resistance bands.  Getting short amounts of exercise can be just as helpful as long, structured periods of exercise. If you have trouble finding time to exercise, try doing these things as part of your daily routine:  Get up, stretch, and walk around every 30 minutes throughout the day.  Go for a walk during your lunch break.  Park your car farther away from your destination.  If you take public transportation, get off one stop early and walk the rest of the way.  Make phone calls while standing up and walking around.  Take the stairs instead of elevators or escalators.  Wear comfortable clothes and shoes with good support.  Do not exercise so much that you hurt yourself, feel dizzy, or get very short of breath.  Where to find more information  [place] Department of Health and Human Services: www.hhs.gov  Centers for Disease Control and Prevention: www.cdc.gov  Contact a health care provider:  Before starting a new exercise program.  If you have questions or concerns about your weight.  If you have a medical problem that keeps you from exercising.  Get help right away if:  You have any of the following while exercising:  Injury.  Dizziness.  Difficulty breathing or shortness of breath that does not go away when you stop exercising.  Chest pain.  Rapid heartbeat.  These symptoms may represent a serious problem that is an emergency. Do not wait to see if the symptoms will go away. Get medical help right away. Call your local emergency services (911 in the [place] Do not drive yourself to the hospital.  Summary  Getting regular exercise is especially important if you are overweight.  Being overweight increases your risk of heart disease, stroke, diabetes, high blood pressure, and several types of cancer.  Losing weight happens when you burn more calories than you eat.  Reducing the amount of calories you eat, and getting regular moderate or vigorous exercise each week, helps you lose weight.  This information is not  intended to replace advice given to you by your health care provider. Make sure you discuss any questions you have with your health care provider.  Document Revised: 02/13/2022 Document Reviewed: 02/13/2022  [employer]vier Patient Education © 2022 [employer]

## 2024-08-03 LAB
ALBUMIN SERPL-MCNC: 4.6 G/DL (ref 3.6–4.6)
ALP SERPL-CCNC: 84 IU/L (ref 44–121)
ALT SERPL-CCNC: 17 IU/L (ref 0–32)
AST SERPL-CCNC: 23 IU/L (ref 0–40)
BILIRUB SERPL-MCNC: 0.5 MG/DL (ref 0–1.2)
BUN SERPL-MCNC: 12 MG/DL (ref 10–36)
BUN/CREAT SERPL: 14 (ref 12–28)
CALCIUM SERPL-MCNC: 9.9 MG/DL (ref 8.7–10.3)
CHLORIDE SERPL-SCNC: 96 MMOL/L (ref 96–106)
CO2 SERPL-SCNC: 27 MMOL/L (ref 20–29)
CREAT SERPL-MCNC: 0.86 MG/DL (ref 0.57–1)
EGFRCR SERPLBLD CKD-EPI 2021: 63 ML/MIN/1.73
ERYTHROCYTE [DISTWIDTH] IN BLOOD BY AUTOMATED COUNT: 13.1 % (ref 11.7–15.4)
GLOBULIN SER CALC-MCNC: 2.8 G/DL (ref 1.5–4.5)
GLUCOSE SERPL-MCNC: 81 MG/DL (ref 70–99)
HCT VFR BLD AUTO: 44.4 % (ref 34–46.6)
HGB BLD-MCNC: 14.4 G/DL (ref 11.1–15.9)
MCH RBC QN AUTO: 28.7 PG (ref 26.6–33)
MCHC RBC AUTO-ENTMCNC: 32.4 G/DL (ref 31.5–35.7)
MCV RBC AUTO: 89 FL (ref 79–97)
PLATELET # BLD AUTO: 245 X10E3/UL (ref 150–450)
POTASSIUM SERPL-SCNC: 4.1 MMOL/L (ref 3.5–5.2)
PROT SERPL-MCNC: 7.4 G/DL (ref 6–8.5)
RBC # BLD AUTO: 5.01 X10E6/UL (ref 3.77–5.28)
SODIUM SERPL-SCNC: 136 MMOL/L (ref 134–144)
T4 FREE SERPL-MCNC: 1.39 NG/DL (ref 0.82–1.77)
TSH SERPL DL<=0.005 MIU/L-ACNC: 2.74 UIU/ML (ref 0.45–4.5)
VIT B12 SERPL-MCNC: 907 PG/ML (ref 232–1245)
WBC # BLD AUTO: 6.6 X10E3/UL (ref 3.4–10.8)

## 2024-08-04 NOTE — PROGRESS NOTES
Mail copy of results to patient.  Blood counts normal  Thyroid normal  B12 normal  Kidney and liver function normal

## 2024-08-26 DIAGNOSIS — E03.8 OTHER SPECIFIED HYPOTHYROIDISM: ICD-10-CM

## 2024-08-26 RX ORDER — LEVOTHYROXINE SODIUM 112 UG/1
112 TABLET ORAL DAILY
Qty: 30 TABLET | Refills: 2 | Status: SHIPPED | OUTPATIENT
Start: 2024-08-26

## 2024-08-26 NOTE — TELEPHONE ENCOUNTER
Caller: Kelvin Restrepo    Relationship: Self    Best call back number: [phone]    Requested Prescriptions:   Requested Prescriptions     Pending Prescriptions Disp Refills    levothyroxine (SYNTHROID, LEVOTHROID) 112 MCG tablet 30 tablet 0     Sig: Take 1 tablet by mouth Daily.        Pharmacy where request should be sent: [address] [phone] [facility] [phone]      Last office visit with prescribing clinician: 8/2/2024   Last telemedicine visit with prescribing clinician: Visit date not found   Next office visit with prescribing clinician: 12/3/2024     Additional details provided by patient: 3-4 DAYS LEFT... PATIENT IS WANTING 90 DAY SUPPLY    Does the patient have less than a 3 day supply:  [x] Yes  [] No    Would you like a call back once the refill request has been completed: [] Yes [x] No    If the office needs to give you a call back, can they leave a voicemail: [] Yes [x] No    Redd Carrasco Rep   08/26/24 09:18 EDT

## 2024-09-09 ENCOUNTER — TELEPHONE (OUTPATIENT)
Dept: FAMILY MEDICINE CLINIC | Facility: CLINIC | Age: 89
End: 2024-09-09
Payer: MEDICARE

## 2024-09-09 RX ORDER — PANTOPRAZOLE SODIUM 40 MG/1
40 TABLET, DELAYED RELEASE ORAL DAILY
Qty: 90 TABLET | Refills: 3 | Status: SHIPPED | OUTPATIENT
Start: 2024-09-09

## 2024-09-09 NOTE — TELEPHONE ENCOUNTER
Pt said she has been taking this for a long time. She also takes famotidine 40mg. Please send med if okay

## 2024-09-09 NOTE — TELEPHONE ENCOUNTER
Caller: Kelvin Restrepo    Relationship: Self    Best call back number: [phone]     What medication are you requesting: PANTOPRAZOLE 40MG    If a prescription is needed, what is your preferred pharmacy and phone number: [facility] [address] [facility] [phone] [facility] [phone] FX     Additional notes: PATIENT WOULD LIKE A 90 DAY FILL SENT IN [facility]. PATIENT IS OUT OF THIS MEDICATION

## 2024-09-23 DIAGNOSIS — I10 ESSENTIAL HYPERTENSION: ICD-10-CM

## 2024-09-23 RX ORDER — METOPROLOL SUCCINATE 25 MG/1
25 TABLET, EXTENDED RELEASE ORAL DAILY
Qty: 30 TABLET | Refills: 2 | Status: SHIPPED | OUTPATIENT
Start: 2024-09-23

## 2024-10-01 NOTE — TELEPHONE ENCOUNTER
Hub staff attempted to follow warm transfer process and was unsuccessful     Caller: Kelvin Restrepo    Relationship to patient: Self    Best call back number: [phone]    Patient is needing: THE PATIENT IS REQUESTING TO SPEAK WITH ROCKY ABOUT HER LINZESS MEDICATION. PLEASE ADVISE.   
Pt aware of meds and to try colace and miralax   
Pt calling on Insyncs costing over 400.00 dollars please advise pt can't pay for this for that medication   
[facility]

## 2024-10-22 ENCOUNTER — OFFICE VISIT (OUTPATIENT)
Dept: FAMILY MEDICINE CLINIC | Facility: CLINIC | Age: 89
End: 2024-10-22
Payer: MEDICARE

## 2024-10-22 VITALS
HEART RATE: 86 BPM | OXYGEN SATURATION: 100 % | SYSTOLIC BLOOD PRESSURE: 128 MMHG | DIASTOLIC BLOOD PRESSURE: 72 MMHG | TEMPERATURE: 97.7 F | WEIGHT: 143.2 LBS | BODY MASS INDEX: 28.11 KG/M2 | HEIGHT: 60 IN

## 2024-10-22 DIAGNOSIS — R11.0 NAUSEA: Primary | ICD-10-CM

## 2024-10-22 PROCEDURE — 1126F AMNT PAIN NOTED NONE PRSNT: CPT | Performed by: NURSE PRACTITIONER

## 2024-10-22 PROCEDURE — 99213 OFFICE O/P EST LOW 20 MIN: CPT | Performed by: NURSE PRACTITIONER

## 2024-10-22 RX ORDER — ONDANSETRON 8 MG/1
8 TABLET, FILM COATED ORAL EVERY 8 HOURS PRN
Qty: 90 TABLET | Refills: 1 | Status: SHIPPED | OUTPATIENT
Start: 2024-10-22 | End: 2024-10-22

## 2024-10-22 RX ORDER — ONDANSETRON 8 MG/1
8 TABLET, FILM COATED ORAL EVERY 8 HOURS PRN
Qty: 90 TABLET | Refills: 1 | Status: SHIPPED | OUTPATIENT
Start: 2024-10-22

## 2024-10-22 NOTE — PROGRESS NOTES
Subjective   Kelvin Restrepo is a 89 y.o. female.     Chief Complaint   Patient presents with    Nausea     Daily  and Reflux          History of Present Illness  Kelvin is a 89-year-old female who presents for evaluation of nausea.    She has not been prescribed any medication specifically for nausea. She is currently on metoprolol and pantoprazole, the latter of which is for her stomach issues. She is also taking Pepcid. She finds that Gas-X helps to settle her stomach.    She has been under the care of Dr. Stoll for approximately 1.5 years. A year ago, she underwent an endoscopy performed by Dr. Brady, which did not reveal any abnormalities.    She has a fear of choking on large pills, which has led her to stop taking her vitamins. She is also on Preservision for her eyes, which she takes with apple sauce to aid swallowing. She has noticed that she tends to eat more on one side of her mouth than the other.       The following portions of the patient's history were reviewed and updated as appropriate: allergies, current medications, past family history, past medical history, past social history, past surgical history and problem list.    Results         Objective     Vitals:    10/22/24 1618   BP: 128/72   Pulse: 86   Temp: 97.7 °F (36.5 °C)   SpO2: 100%      Body mass index is 27.97 kg/m².    Physical Exam  Constitutional:       Appearance: Normal appearance.   HENT:      Head: Normocephalic and atraumatic.      Nose: Nose normal.      Mouth/Throat:      Mouth: Mucous membranes are moist.   Eyes:      Pupils: Pupils are equal, round, and reactive to light.   Cardiovascular:      Rate and Rhythm: Normal rate and regular rhythm.      Pulses: Normal pulses.      Heart sounds: Normal heart sounds.   Pulmonary:      Effort: Pulmonary effort is normal.      Breath sounds: Normal breath sounds.   Abdominal:      General: Bowel sounds are normal.   Musculoskeletal:         General: Normal range of motion.      Cervical  back: Normal range of motion.   Skin:     General: Skin is warm and dry.      Capillary Refill: Capillary refill takes less than 2 seconds.   Neurological:      General: No focal deficit present.      Mental Status: She is alert.   Psychiatric:         Mood and Affect: Mood normal.           Assessment & Plan  1. Nausea.  A prescription for anti-nausea medication was provided. She was advised to take the medication anytime she feels nauseous. She was also advised to continue using Gas-X as needed.    2. Gastroesophageal Reflux Disease (GERD).  She is currently taking pantoprazole (Protonix) and Pepcid for her stomach issues. She reports that these medications have not fully alleviated her symptoms. She was advised to follow up with her gastroenterologist, Dr. Brady, for further evaluation and potential adjustment of her medication regimen.    Diagnoses and all orders for this visit:    1. Nausea (Primary)  -     Discontinue: ondansetron (Zofran) 8 MG tablet; Take 1 tablet by mouth Every 8 (Eight) Hours As Needed for Nausea or Vomiting.  Dispense: 90 tablet; Refill: 1  -     Ambulatory Referral to Gastroenterology  -     ondansetron (Zofran) 8 MG tablet; Take 1 tablet by mouth Every 8 (Eight) Hours As Needed for Nausea or Vomiting.  Dispense: 90 tablet; Refill: 1      Body mass index is 27.97 kg/m².       Discussed Care Gaps, ordered referrals and encouraged vaccination updates.       - Pt agrees with plan of care and denies further questions/concerns today  - This document is intended for medical expert use only. Persons  reading this document without medical staff guidance may result in misinterpretation and unintended morbidity     Go to the ER for any possible life-threatening symptoms such as chest pain or shortness of air.      Please allow 3-5 business days for recommendations based on new results      I personally spent time with this patient, preparing for the visit, reviewing tests, obtaining and/or  reviewing a separately obtained history, performing a medically appropriate examination and/or evaluation, counseling and educating the patient/family/caregiver, ordering medications,  documenting information in the medical record and indepentently interpreting results.       Patient or patient representative verbalized consent for the use of Ambient Listening during the visit with  VEENA Parker for chart documentation. 10/27/2024  16:45 EDT    MDM:  Nausea treated with zofran.  Continued GERD, patient advised to revisit Dr. Brady, since she is taking two PPI's without relief.

## 2024-12-03 DIAGNOSIS — R09.82 POSTNASAL DRIP: ICD-10-CM

## 2024-12-03 RX ORDER — FLUTICASONE PROPIONATE 50 MCG
2 SPRAY, SUSPENSION (ML) NASAL DAILY
Qty: 48 G | Refills: 0 | Status: SHIPPED | OUTPATIENT
Start: 2024-12-03

## 2024-12-30 DIAGNOSIS — I10 ESSENTIAL HYPERTENSION: ICD-10-CM

## 2024-12-30 DIAGNOSIS — E03.8 OTHER SPECIFIED HYPOTHYROIDISM: ICD-10-CM

## 2024-12-30 RX ORDER — LEVOTHYROXINE SODIUM 112 UG/1
112 TABLET ORAL DAILY
Qty: 30 TABLET | Refills: 2 | Status: SHIPPED | OUTPATIENT
Start: 2024-12-30

## 2024-12-30 RX ORDER — METOPROLOL SUCCINATE 25 MG/1
25 TABLET, EXTENDED RELEASE ORAL DAILY
Qty: 30 TABLET | Refills: 2 | Status: SHIPPED | OUTPATIENT
Start: 2024-12-30

## 2024-12-30 NOTE — TELEPHONE ENCOUNTER
Caller: Kelvin Restrepo    Relationship: Self    Best call back number: [phone]     Requested Prescriptions:   Requested Prescriptions     Pending Prescriptions Disp Refills    metoprolol succinate XL (TOPROL-XL) 25 MG 24 hr tablet 30 tablet 2     Sig: Take 1 tablet by mouth Daily.    levothyroxine (SYNTHROID, LEVOTHROID) 112 MCG tablet 30 tablet 2     Sig: Take 1 tablet by mouth Daily.        Pharmacy where request should be sent: [place] [phone] [place] [phone]      Last office visit with prescribing clinician: 8/2/2024   Last telemedicine visit with prescribing clinician: Visit date not found   Next office visit with prescribing clinician: Visit date not found     Additional details provided by patient: WILL NEED NEW PRESCRIPTION WITH 90 DAY SUPPLY    Does the patient have less than a 3 day supply:  [x] Yes  [] No    Would you like a call back once the refill request has been completed: [] Yes [] No    If the office needs to give you a call back, can they leave a voicemail: [] Yes [] No    Redd Goetz Rep   12/30/24 08:34 EST

## 2025-01-08 RX ORDER — PANTOPRAZOLE SODIUM 40 MG/1
40 TABLET, DELAYED RELEASE ORAL DAILY
Qty: 90 TABLET | Refills: 0 | Status: SHIPPED | OUTPATIENT
Start: 2025-01-08

## 2025-01-08 NOTE — TELEPHONE ENCOUNTER
Caller: Kelvin Restrepo    Relationship: Self    Best call back number: [phone]     Requested Prescriptions:   Requested Prescriptions     Pending Prescriptions Disp Refills    pantoprazole (PROTONIX) 40 MG EC tablet 90 tablet 3     Sig: Take 1 tablet by mouth Daily.        Pharmacy where request should be sent: [address] [phone] [facility] [phone]      Last office visit with prescribing clinician: 8/2/2024   Last telemedicine visit with prescribing clinician: Visit date not found   Next office visit with prescribing clinician: Visit date not found     Additional details provided by patient: PATIENT HAS 3 PILLS LEFT.     Does the patient have less than a 3 day supply:  [x] Yes  [] No    Would you like a call back once the refill request has been completed: [x] Yes [] No    If the office needs to give you a call back, can they leave a voicemail: [x] Yes [] No    Redd Bobo Rep   01/08/25 10:25 EST

## 2025-01-31 NOTE — PROGRESS NOTES
"Chief Complaint  Dizziness (Vertigo)    Subjective          Kelvin Restrepo presents to [facility]  History of Present Illness     Patient is here today with complaint of feeling of unbalanced that started a long while ago.  She reports she has also been feeling fatigued for a while now too.   This morning had some pain in neck and in left shoulder when she woke up.    Felt like she had a catch in shoulder blade.  Worried it may be pleurisy.    Would like flu shot today    Hasn't had thyroid check since April.     Review of Systems   Constitutional: Positive for fatigue. Negative for fever.   HENT: Positive for rhinorrhea. Negative for congestion, ear pain and sore throat.    Respiratory: Negative for cough, shortness of breath and wheezing.    Cardiovascular: Negative for chest pain.   Gastrointestinal: Positive for constipation. Negative for abdominal pain, diarrhea, nausea and vomiting.   Genitourinary: Negative for dysuria and urgency.   Musculoskeletal: Positive for neck pain.   Skin: Negative.    Neurological: Positive for dizziness (\"feeling unsteady\").           Objective   Vital Signs:   /70   Pulse 95   Ht 156.2 cm (61.5\")   Wt 70.8 kg (156 lb)   SpO2 96%   BMI 29.00 kg/m²     Physical Exam  Constitutional:       Appearance: Normal appearance.   HENT:      Head: Normocephalic and atraumatic.      Right Ear: Tympanic membrane has decreased mobility.      Left Ear: Tympanic membrane has decreased mobility.      Nose: Nose normal.      Right Sinus: No maxillary sinus tenderness or frontal sinus tenderness.      Left Sinus: No maxillary sinus tenderness or frontal sinus tenderness.      Mouth/Throat:      Lips: Pink.      Pharynx: Oropharynx is clear.      Tonsils: 0 on the right. 0 on the left.   Cardiovascular:      Rate and Rhythm: Normal rate and regular rhythm.      Pulses: Normal pulses.   Pulmonary:      Effort: Pulmonary effort is normal.      Breath sounds: " Normal breath sounds.   Skin:     General: Skin is warm and dry.   Neurological:      Mental Status: She is alert.   Psychiatric:         Mood and Affect: Mood normal.         Behavior: Behavior normal.         Thought Content: Thought content normal.         Judgment: Judgment normal.        Result Review :                 Assessment and Plan    Diagnoses and all orders for this visit:    1. Chronic fatigue (Primary)  -     CBC & Differential  -     Comprehensive Metabolic Panel  -     Lipid Panel  -     TSH  -     Vitamin B12  -     Vitamin D 25 Hydroxy  -     Magnesium    2. Gastroesophageal reflux disease, unspecified whether esophagitis present  -     CBC & Differential  -     Comprehensive Metabolic Panel  -     Lipid Panel  -     TSH  -     Vitamin B12  -     Vitamin D 25 Hydroxy  -     Magnesium    3. Other specified hypothyroidism  -     CBC & Differential  -     Comprehensive Metabolic Panel  -     Lipid Panel  -     TSH  -     Vitamin B12  -     Vitamin D 25 Hydroxy  -     Magnesium    4. Essential hypertension  -     CBC & Differential  -     Comprehensive Metabolic Panel  -     Lipid Panel  -     TSH  -     Vitamin B12  -     Vitamin D 25 Hydroxy  -     Magnesium    5. Cobalamin deficiency  -     CBC & Differential  -     Comprehensive Metabolic Panel  -     Lipid Panel  -     TSH  -     Vitamin B12  -     Vitamin D 25 Hydroxy  -     Magnesium    6. Need for influenza vaccination  -     CBC & Differential  -     Comprehensive Metabolic Panel  -     Lipid Panel  -     TSH  -     Vitamin B12  -     Vitamin D 25 Hydroxy  -     Magnesium  -     FluLaval/Fluarix/Fluzone >6 Months ([id number])    7. Chronic constipation  -     CBC & Differential  -     Comprehensive Metabolic Panel  -     Lipid Panel  -     TSH  -     Vitamin B12  -     Vitamin D 25 Hydroxy  -     Magnesium    8. Dizziness  -     CBC & Differential  -     Comprehensive Metabolic Panel  -     Lipid Panel  -     TSH  -     Vitamin B12  -      Vitamin D 25 Hydroxy  -     Magnesium    9. Age-related osteoporosis without current pathological fracture  -     Vitamin D 25 Hydroxy    Other orders  -     fluticasone (Flonase) 50 MCG/ACT nasal spray; 2 sprays into the nostril(s) as directed by provider Daily.  Dispense: 16 g; Refill: 2      Patient to start Flonase for fluid behind the ears.  This should help with dizziness.  We will check labs today and call with results any changes needed to plan of care for dizziness based on chronic conditions.  I am also checking vitamin D and B12 level as they have been low in the past.    Okay for flu shot today.    Follow-up recommendations based on results.      Follow Up   No follow-ups on file.  Patient was given instructions and counseling regarding her condition or for health maintenance advice. Please see specific information pulled into the AVS if appropriate.     Patient has had a mastectomy, needs new breast prosthesis and bras for balance and symmetry.  Hers are past their useful lifespan.           no None

## 2025-02-11 DIAGNOSIS — F41.0 PANIC ATTACK: ICD-10-CM

## 2025-02-11 RX ORDER — LORAZEPAM 0.5 MG/1
TABLET ORAL
Qty: 90 TABLET | Refills: 2 | OUTPATIENT
Start: 2025-02-11

## 2025-02-11 NOTE — TELEPHONE ENCOUNTER
Caller: Kelvin Restrepo    Relationship: Self    Best call back number: [phone]    Requested Prescriptions:   Requested Prescriptions     Pending Prescriptions Disp Refills    LORazepam (Ativan) 0.5 MG tablet 90 tablet 2     Si po daily prn anxiety        Pharmacy where request should be sent: [facility] [address] [phone] [facility] [phone]      Last office visit with prescribing clinician: 2024   Last telemedicine visit with prescribing clinician: Visit date not found   Next office visit with prescribing clinician: Visit date not found     Redd Zepeda Rep   25 12:58 EST

## 2025-02-17 ENCOUNTER — OFFICE VISIT (OUTPATIENT)
Dept: FAMILY MEDICINE CLINIC | Facility: CLINIC | Age: OVER 89
End: 2025-02-17
Payer: MEDICARE

## 2025-02-17 VITALS
HEIGHT: 60 IN | DIASTOLIC BLOOD PRESSURE: 90 MMHG | SYSTOLIC BLOOD PRESSURE: 144 MMHG | WEIGHT: 143.2 LBS | BODY MASS INDEX: 28.11 KG/M2 | HEART RATE: 73 BPM | OXYGEN SATURATION: 95 %

## 2025-02-17 DIAGNOSIS — F41.0 PANIC ATTACK: ICD-10-CM

## 2025-02-17 DIAGNOSIS — H53.9 VISUAL DISTURBANCES: ICD-10-CM

## 2025-02-17 DIAGNOSIS — F41.1 GENERALIZED ANXIETY DISORDER WITH PANIC ATTACKS: Primary | ICD-10-CM

## 2025-02-17 DIAGNOSIS — F41.0 GENERALIZED ANXIETY DISORDER WITH PANIC ATTACKS: Primary | ICD-10-CM

## 2025-02-17 DIAGNOSIS — Z79.899 CONTROLLED SUBSTANCE AGREEMENT SIGNED: ICD-10-CM

## 2025-02-17 PROCEDURE — 1126F AMNT PAIN NOTED NONE PRSNT: CPT

## 2025-02-17 PROCEDURE — 99213 OFFICE O/P EST LOW 20 MIN: CPT

## 2025-02-17 PROCEDURE — 1159F MED LIST DOCD IN RCRD: CPT

## 2025-02-17 PROCEDURE — 1160F RVW MEDS BY RX/DR IN RCRD: CPT

## 2025-02-17 RX ORDER — LORAZEPAM 0.5 MG/1
TABLET ORAL
Qty: 90 TABLET | Refills: 2 | Status: SHIPPED | OUTPATIENT
Start: 2025-02-17 | End: 2025-02-21 | Stop reason: SDUPTHER

## 2025-02-17 NOTE — PATIENT INSTRUCTIONS

## 2025-02-17 NOTE — PROGRESS NOTES
Subjective   Kelvin Restrepo is a 89 y.o. female.     Patient or patient representative verbalized consent for the use of Ambient Listening during the visit with  VEENA Roca for chart documentation. 2/17/2025  15:01 EST    Chief Complaint   Patient presents with    Med Refill       History of Present Illness  The patient is a 89-year-old female who presents for evaluation of visual disturbances, anxiety, and elevated blood pressure.    Visual Disturbances  She reports experiencing visual disturbances, including difficulty wearing her glasses and blurred vision, particularly when reading text. These symptoms began after a dental cleaning procedure last Friday. She also describes a sensation of pressure in the back of her head or neck, which she attributes to an uncomfortable chair during the dental procedure. Despite these symptoms, she does not report any associated headaches. She has an upcoming appointment with her ophthalmologist on Thursday. She is able to turn her neck side to side, although it is stiff.  - Onset: After a dental cleaning procedure last Friday.  - Location: Visual disturbances and pressure in the back of her head or neck.  - Character: Difficulty wearing glasses, blurred vision, pressure sensation.  - Alleviating/Aggravating Factors: Attributed to an uncomfortable chair during the dental procedure.  - Timing: Particularly when reading text.  - Severity: No associated headaches; neck stiffness present.    Anxiety  She is seeking a refill of her lorazepam 0.5 mg prescription, which she uses as needed for anxiety management. She has a history of severe anxiety attacks, some of which have necessitated emergency room visits. Her use of lorazepam varies, sometimes requiring daily administration, but she prefers to limit her medication intake.  - Onset: History of severe anxiety attacks.  - Character: Severe anxiety attacks necessitating emergency room visits.  - Alleviating/Aggravating  Factors: Uses lorazepam 0.5 mg as needed.  - Timing: Varies, sometimes requiring daily administration.  - Severity: Severe, requiring emergency room visits at times.    Elevated Blood Pressure  She acknowledges that her blood pressure tends to elevate during clinic visits. She confirms adherence to her metoprolol regimen this morning.  - Onset: During clinic visits.  - Alleviating/Aggravating Factors: Adherence to metoprolol regimen.  - Timing: Elevated during clinic visits.    MEDICATIONS  - Lorazepam  - Metoprolol       The following portions of the patient's history were reviewed and updated as appropriate: allergies, current medications, past family history, past medical history, past social history, past surgical history and problem list.    Results         Objective     Vitals:    02/17/25 1443   BP: 144/90   Pulse: 73   SpO2: 95%      Body mass index is 27.97 kg/m².    Physical Exam  Vitals reviewed.   Constitutional:       Appearance: Normal appearance.   Eyes:      Conjunctiva/sclera: Conjunctivae normal.      Pupils: Pupils are equal, round, and reactive to light.   Cardiovascular:      Rate and Rhythm: Normal rate.   Skin:     General: Skin is warm and dry.   Neurological:      Mental Status: She is alert and oriented to person, place, and time.   Psychiatric:         Behavior: Behavior normal.       Assessment & Plan  1. Visual disturbances  - Reports difficulty wearing glasses and blurred vision, especially when reading text  - Symptoms began after a dental cleaning procedure last Friday  - Describes a sensation of pressure in the back of her head or neck, attributed to an uncomfortable chair during the dental procedure  - No associated headaches reported  - Upcoming appointment with ophthalmologist on Thursday  - Able to turn neck side to side, although it is stiff  - Advised to use a heating pad and massage to alleviate neck discomfort    2. Anxiety  - History of severe anxiety attacks, some  necessitating emergency room visits  - Use of lorazepam varies, sometimes requiring daily administration  - Prefers to limit medication intake  - Prescription refill for lorazepam 0.5 mg to be used as needed for anxiety  - Prescription to be sent to [facility] pharmacy  - Signed a contract for responsible use of this controlled substance    3. Elevated blood pressure  - Blood pressure slightly elevated today  - Confirmed taking metoprolol this morning       Discussed Care Gaps, ordered referrals and encouraged vaccination updates.       - Pt agrees with plan of care and denies further questions/concerns today  - This document is intended for medical expert use only. Persons  reading this document without medical staff guidance may result in misinterpretation and unintended morbidity     Go to the ER for any possible life-threatening symptoms such as chest pain or shortness of air.      Please allow 3-5 business days for recommendations based on new results      I personally spent time with this patient, preparing for the visit, reviewing tests, obtaining and/or reviewing a separately obtained history, performing a medically appropriate examination and/or evaluation, counseling and educating the patient/family/caregiver, ordering medications,  documenting information in the medical record and indepentently interpreting results.

## 2025-02-21 ENCOUNTER — TELEPHONE (OUTPATIENT)
Dept: FAMILY MEDICINE CLINIC | Facility: CLINIC | Age: OVER 89
End: 2025-02-21

## 2025-02-21 DIAGNOSIS — F41.0 PANIC ATTACK: ICD-10-CM

## 2025-02-21 RX ORDER — LORAZEPAM 0.5 MG/1
TABLET ORAL
Qty: 90 TABLET | Refills: 2 | Status: SHIPPED | OUTPATIENT
Start: 2025-02-21

## 2025-02-21 NOTE — TELEPHONE ENCOUNTER
Caller: Bradley Brewster    Relationship: Emergency Contact    Best call back number: [phone]      What was the call regarding: PATIENT IS NEEDING TO KNOW IF [facility] HAS RECEIVED HER PRESCRIPTION FOR LORazepam (Ativan) 0.5 MG tablet. PLEASE ADVISE.

## 2025-03-25 ENCOUNTER — TELEPHONE (OUTPATIENT)
Dept: FAMILY MEDICINE CLINIC | Facility: CLINIC | Age: OVER 89
End: 2025-03-25
Payer: MEDICARE

## 2025-03-25 NOTE — TELEPHONE ENCOUNTER
Patient fell on Sunday, was seen in ER nothing broken. SME[facility]. Taking ibuprofen but it is making her sick. Should she keep taking it?     Right shoulder, arm, elbow pain.    Would like Danielle or MAISHA to call her back.    Needs a follow up appt also.  Afternoon appt.    [phone]

## 2025-03-26 ENCOUNTER — OFFICE VISIT (OUTPATIENT)
Dept: FAMILY MEDICINE CLINIC | Facility: CLINIC | Age: OVER 89
End: 2025-03-26
Payer: MEDICARE

## 2025-03-26 VITALS
SYSTOLIC BLOOD PRESSURE: 158 MMHG | BODY MASS INDEX: 27.48 KG/M2 | DIASTOLIC BLOOD PRESSURE: 88 MMHG | TEMPERATURE: 97.3 F | WEIGHT: 140 LBS | HEIGHT: 60 IN | OXYGEN SATURATION: 98 % | HEART RATE: 79 BPM

## 2025-03-26 DIAGNOSIS — M25.511 ACUTE PAIN OF RIGHT SHOULDER: ICD-10-CM

## 2025-03-26 DIAGNOSIS — M25.551 ACUTE PAIN OF RIGHT HIP: ICD-10-CM

## 2025-03-26 DIAGNOSIS — R11.2 NAUSEA AND VOMITING, UNSPECIFIED VOMITING TYPE: Primary | ICD-10-CM

## 2025-03-26 DIAGNOSIS — T14.8XXA BRUISING: ICD-10-CM

## 2025-03-26 RX ORDER — TRIAMCINOLONE ACETONIDE 40 MG/ML
40 INJECTION, SUSPENSION INTRA-ARTICULAR; INTRAMUSCULAR ONCE
Status: COMPLETED | OUTPATIENT
Start: 2025-03-26 | End: 2025-03-26

## 2025-03-26 RX ORDER — KETOROLAC TROMETHAMINE 30 MG/ML
30 INJECTION, SOLUTION INTRAMUSCULAR; INTRAVENOUS ONCE
Status: COMPLETED | OUTPATIENT
Start: 2025-03-26 | End: 2025-03-26

## 2025-03-26 RX ORDER — ONDANSETRON 4 MG/1
4 TABLET, ORALLY DISINTEGRATING ORAL EVERY 8 HOURS PRN
Qty: 24 TABLET | Refills: 0 | Status: SHIPPED | OUTPATIENT
Start: 2025-03-26

## 2025-03-26 RX ORDER — ONDANSETRON 4 MG/1
4 TABLET, ORALLY DISINTEGRATING ORAL ONCE
Status: COMPLETED | OUTPATIENT
Start: 2025-03-26 | End: 2025-03-26

## 2025-03-26 RX ADMIN — ONDANSETRON 4 MG: 4 TABLET, ORALLY DISINTEGRATING ORAL at 15:38

## 2025-03-26 RX ADMIN — KETOROLAC TROMETHAMINE 30 MG: 30 INJECTION, SOLUTION INTRAMUSCULAR; INTRAVENOUS at 15:42

## 2025-03-26 RX ADMIN — TRIAMCINOLONE ACETONIDE 40 MG: 40 INJECTION, SUSPENSION INTRA-ARTICULAR; INTRAMUSCULAR at 15:43

## 2025-03-26 NOTE — PROGRESS NOTES
Subjective   Kelvin Restrepo is a 89 y.o. female. Presents today for   Chief Complaint   Patient presents with    Nausea    Vomiting    Fall         History of Present Illness  The patient presents following Patient is a 89-year-old female is here for f/u from ER after mechanical fall at home landing on primarily her right shoulder and her right side. She has some brusing to b/l knees and right hip pain, but able to bear weight;  Using walker at home to improve stability.   She also on 3/19/25 dx with covid 19, she is recovering well; no f/c; no cough;  Had some mild dyspnea with activity;   She has nausea and vomiting x1 this am;  no headaches or focal neuro deficits;  She is alert and oriented no acute distress still. She does not take any blood thinners. She denies hitting her head.  History of Present Illness    Review of Systems   Constitutional:  Negative for chills and fever.   Respiratory:  Positive for shortness of breath. Negative for cough.    Cardiovascular:  Negative for chest pain.   Gastrointestinal:  Positive for nausea and vomiting. Negative for abdominal pain.   Musculoskeletal:  Positive for arthralgias.   Skin:  Positive for color change.       Patient Active Problem List   Diagnosis    Suprapubic pain    Allergic state    Chronic constipation    Gastroesophageal reflux disease    Fatigue    Hyperlipidemia    Hypothyroidism    Insomnia    Obstructive sleep apnea syndrome    Osteoarthritis of multiple joints    Osteoporosis    Cobalamin deficiency    Essential hypertension    Anxiety    H/O right mastectomy    H/O hyperlipidemia    History of breast cancer    History of hypothyroidism    Seasonal allergic rhinitis    Glaucoma (increased eye pressure)       Social History     Socioeconomic History    Marital status:    Tobacco Use    Smoking status: Never     Passive exposure: Never    Smokeless tobacco: Never   Vaping Use    Vaping status: Never Used   Substance and Sexual Activity     "Alcohol use: No    Drug use: No    Sexual activity: Not Currently     Partners: Male     Birth control/protection: Post-menopausal       Allergies   Allergen Reactions    Amlodipine      Patient does not recall ever having a reaction to this medication     Codeine Other (See Comments)    Prednisone Hives    Atorvastatin Myalgia         Objective   Vitals:    03/26/25 1514   BP: 158/88   Pulse: 79   Temp: 97.3 °F (36.3 °C)   TempSrc: Oral   SpO2: 98%   Weight: 63.5 kg (140 lb)   Height: 152.4 cm (60\")     Body mass index is 27.34 kg/m².    Physical Exam  Vitals and nursing note reviewed.   Constitutional:       Appearance: She is well-developed.   HENT:      Head: Normocephalic and atraumatic.   Neck:      Thyroid: No thyromegaly.      Vascular: No JVD.   Cardiovascular:      Rate and Rhythm: Normal rate and regular rhythm.      Heart sounds: Normal heart sounds. No murmur heard.     No friction rub. No gallop.   Pulmonary:      Effort: Pulmonary effort is normal. No respiratory distress.      Breath sounds: Normal breath sounds. No wheezing or rales.   Abdominal:      General: Bowel sounds are normal. There is no distension.      Palpations: Abdomen is soft.      Tenderness: There is no abdominal tenderness. There is no guarding or rebound.   Musculoskeletal:      Cervical back: Neck supple.      Comments: Inferior to bilateral knees echymosis; no severe tenderness;  On right small hematoma;  no concerning skin changes  Mild tenderness to right lateral hip and shoudler, but no bony deformities;    Skin:     General: Skin is warm and dry.   Neurological:      Mental Status: She is alert.      Gait: Gait normal.   Psychiatric:         Behavior: Behavior normal.     Contains abnormal data COMPREHENSIVE METABOLIC PANEL  Order: [id number]  Component  Ref Range & Units 1 mo ago   Sodium  136 - 145 mmol/L 134 Low    Potassium  3.5 - 5.1 mmol/L 3.6   Chloride  98 - 110 mmol/L 99   CO2  21 - 31 mmol/L 23   Anion Gap  2.0 - " "11.0 12 High    Calcium  8.6 - 10.2 mg/dL 9.5   Glucose  74 - 109 mg/dL 88   BUN  7 - 25 mg/dL 17   Creatinine  0.60 - 1.20 mg/dL 0.67   BUN/Creatinine Ratio  6.0 - 22.0 25.4 High    Albumin  3.5 - 5.2 Gram/dL 4.3   Total Protein  6.4 - 8.9 Gram/dL 7.6   A/G Ratio  1.0 - 1.7 1.3   Alkaline Phosphatase  34 - 104 Units/Liter 70   ALT (SGPT)  <=24 Units/Liter 11   AST  13 - 39 Units/Liter 20   Total Bilirubin  0.3 - 1.0 mg/dL 0.6   Globulin, Total  Gram/dL 3   EGFR  >=60 mL/min/1.73m2 81   Comment: eGFR calculation performed using the CKD-EPI 2021 equation (race variable excluded)     [id number] -- 03/19/2025 19:43  CR Chest 1 Vw Portable (03/19/2025 20:32)  Result: CR Chest 1 Vw PortableACCESSION NUMBER: [id number]: 3/19/2025 20:32PROVIDED INDICATION: covid  positiveCOMPARISON: 02/17/2025TECHNIQUE: CR Chest 1 Vw PortableSTUDY QUALITY: AdequateFINDINGS:SUPPORT  DEVICES: NoneCARDIOVASCULAR/MEDIASTINUM: Normal heart size. LUNGS/PLEURA: Hazy groundglass opacities throughout the  lungs bilaterally. No pleural effusion. No pneumothorax.DIAPHRAGM/UPPER ABDOMEN: No gross free air under the  hemidiaphragms.BONES/SOFT TISSUES: No acute fracture visualized.IMPRESSION: Hazy groundglass opacities throughout the  lungs bilaterally. Likely infiltrates including atypical and viral etiologies. Follow-up to ensure clearing. Dictated by: Juan Ariza DO  Signed by Juan Ariza DO on 3/19/2025 21:08  [id number] -- 03/23/2025 11:49  CR Shoulder Comp Min 2 Vws RT (03/23/2025 13:41)  Result: EXAM: CR Shoulder Comp Min 2 Vws RTACCESSION NUMBER: [id number]: 3/23/2025 13:41PROVIDED INDICATION:  PAINCOMPARISON: NoneTECHNIQUE: AP radiographs of the right shoulder in internal and external rotation were  obtained.FINDINGS: No acute fracture or dislocation. No definite soft tissue or skin defect. No unexpected radiopaque foreign bodies  overlie the soft tissues.IMPRESSION: No acute fracture or dislocation. \"I, the " "attending/teaching physician, have personally reviewed,  discussed, and supervised this radiological examination with the resident and this report reflects my agreement.\"Dictated by: Patrick Aranda MD Signed by Anuj Kay M.D. on 3/23/2025 15:09  CT Abdomen Pelvis W (03/23/2025 14:51)  Result: ACCESSION NUMBER: [id number], [id number]: 3/23/2025 14:36EXAMINATION: CT Chest W, CT Abdomen Pelvis  WPROVIDED INDICATION: Fall trauma right rib pain/right shoulder pain.COMPARISON: NoneTECHNIQUE: Axial computed  tomographic images of the chest, abdomen, and pelvis after intravenous administration of 100 mL Isovue-370. Oral contrast was not  administered. Reformatted images include axial, sagittal, and coronal images of the chest, abdomen, and pelvis; coronal oblique image  of the aortic arch.FINDINGS:CHEST:Lungs: No evidence of traumatic lung injury. Multiple groundglass opacities in the anterior lower  segment of the right upper lobe. Multiple small sub-6 mm nodules scattered in both lungs. Bilateral basal atelectatic changes. Diffuse  centrilobular emphysematous changes. A calcified granuloma in the right upper lobe.Large Airways: Central airways are intact.Pleura:  No pneumothorax or pleural fluid accumulation.Lymph Nodes: No lymphadenopathy.Mediastinum: No mediastinal hematoma or  pneumomediastinum.Vessels: Motion artifact at the aortic root lowers sensitivity for subtle findings. Otherwise, no evidence of an acute  traumatic aortic injury. Heart: Normal size. No pericardial effusion.Chest Wall and Lower Neck: No acute traumatic findings.Bones: No  acute fracture. Disc degenerative changes most prominent at C7-T1 and T11-12. Multiple anterior  osteophytes.ABDOMEN/PELVIS:Liver: No evidence of acute traumatic injury. Multiple small hypodense lesions scattered in the right  hepatic lobe, the largest is in segment 6, statistically related to hepatic cysts. Bile Ducts: Normal caliber.Gallbladder: No evidence " "of  acute traumatic injury. Pancreas: No evidence of acute traumatic injury.Spleen: No evidence of acute traumatic injury.Adrenal Glands:  No evidence of acute traumatic injury.Kidneys: No evidence of acute traumatic injury.Ureters: No evidence of acute traumatic  injury.Urinary Bladder: No evidence of acute traumatic injury.Reproductive Organs: The uterus is surgically absent. No adnexal masses  are visualized.Lymph Nodes: No evidence of acute traumatic injury. Vessels: The large abdominal and pelvic vessels appear  intact.Bowel and Mesentery: A small sliding hiatal hernia. No bowel wall thickening or mesenteric free fluid.Peritoneum: No free  intraperitoneal fluid or pneumoperitoneum.Abdominal Wall: No evidence of major body wall injury.Bones: No acute fracture. Moderate  levoscoliosis of the lumbar spine. Multilevel severe disc degenerative changes with vacuum phenomena and loss of disc height. Multiple  anterior osteophytes. Multiple bilateral facet arthropathic changes.IMPRESSION:CHEST:No acute traumatic injuries in the lungs or the  thoracic cage.Diffuse centrilobular emphysema with multiple groundglass opacities in the right upper lobe likely related to atelectasis.ABDOMEN AND PELVIS:No acute traumatic injuries in the solid organs of the abdomen or the bowel. No active  extravasation.\"I, the attending/teaching physician, have personally reviewed, discussed, and supervised this radiological examination  with the resident and this report reflects my agreement.\"Dictated by: Arden Garcia MD Signed by Anuj Kay M.D. on  3/23/2025 15:26  CT Chest W (03/23/2025 14:51)  Result: ACCESSION NUMBER: [id number], [id number]: 3/23/2025 14:36EXAMINATION: CT Chest W, CT Abdomen Pelvis  WPROVIDED INDICATION: Fall trauma right rib pain/right shoulder pain.COMPARISON: NoneTECHNIQUE: Axial computed  tomographic images of the chest, abdomen, and pelvis after intravenous administration of 100 mL Isovue-370. " Oral contrast was not  administered. Reformatted images include axial, sagittal, and coronal images of the chest, abdomen, and pelvis; coronal oblique image  of the aortic arch.FINDINGS:CHEST:Lungs: No evidence of traumatic lung injury. Multiple groundglass opacities in the anterior lower  segment of the right upper lobe. Multiple small sub-6 mm nodules scattered in both lungs. Bilateral basal atelectatic changes. Diffuse  centrilobular emphysematous changes. A calcified granuloma in the right upper lobe.Large Airways: Central airways are intact.Pleura:  No pneumothorax or pleural fluid accumulation.Lymph Nodes: No lymphadenopathy.Mediastinum: No mediastinal hematoma or  pneumomediastinum.Vessels: Motion artifact at the aortic root lowers sensitivity for subtle findings. Otherwise, no evidence of an acute  traumatic aortic injury. Heart: Normal size. No pericardial effusion.Chest Wall and Lower Neck: No acute traumatic findings.Bones: No  acute fracture. Disc degenerative changes most prominent at C7-T1 and T11-12. Multiple anterior  osteophytes.ABDOMEN/PELVIS:Liver: No evidence of acute traumatic injury. Multiple small hypodense lesions scattered in the right  hepatic lobe, the largest is in segment 6, statistically related to hepatic cysts. Bile Ducts: Normal caliber.Gallbladder: No evidence of  acute traumatic injury. Pancreas: No evidence of acute traumatic injury.Spleen: No evidence of acute traumatic injury.Adrenal Glands:  No evidence of acute traumatic injury.Kidneys: No evidence of acute traumatic injury.Ureters: No evidence of acute traumatic  injury.Urinary Bladder: No evidence of acute traumatic injury.Reproductive Organs: The uterus is surgically absent. No adnexal masses  are visualized.Lymph Nodes: No evidence of acute traumatic injury. Vessels: The large abdominal and pelvic vessels appear  intact.Bowel and Mesentery: A small sliding hiatal hernia. No bowel wall thickening or mesenteric free  "fluid.Peritoneum: No free  intraperitoneal fluid or pneumoperitoneum.Abdominal Wall: No evidence of major body wall injury.Bones: No acute fracture. Moderate  levoscoliosis of the lumbar spine. Multilevel severe disc degenerative changes with vacuum phenomena and loss of disc height. Multiple  anterior osteophytes. Multiple bilateral facet arthropathic changes.IMPRESSION:CHEST:No acute traumatic injuries in the lungs or the  thoracic cage.Diffuse centrilobular emphysema with multiple groundglass opacities in the right upper lobe likely related to  atelectasis.ABDOMEN AND PELVIS:No acute traumatic injuries in the solid organs of the abdomen or the bowel. No active  extravasation.\"I, the attending/teaching physician, have personally reviewed, discussed, and supervised this radiological examination  with the resident and this report reflects my agreement.\"Dictated by: Arden Garcia MD Signed by Anuj Kay M.D. on  3/23/2025 15:26  CT Head WO (03/23/2025 14:51)  Result: ACCESSION NUMBER: [id number]: 3/23/2025 14:36EXAMINATION: CT Head WOPROVIDED INDICATION: Trauma:  AMSCOMPARISON: NoneTECHNIQUE: A routine noncontrast head CT was performed in the axial plane. Sagittal and coronal  reformats were obtained. Dose reduction techniques were used.FINDINGS:Major findings:No acute intracranial hemorrhage,  space-occupying mass, mass effect, midline shift, hydrocephalus or cortical based acute stroke.Minor findings:Mild diffuse parenchymal  volume loss. Mild scattered hypodensities in the supratentorial white matter, particularly in the periventricular region, suggestive of mild  chronic microvascular ischemic change. Mild to moderate prominence of all the ventricles, likely related to central parenchymal volume  loss. Normal gray-white matter differentiation is noted. Basal ganglia, brainstem and posterior fossa are grossly unremarkable.Incidental  findings:Limited imaging of the orbits and ocular structures are " unremarkable. Paranasal sinuses and mastoid air cells do not  demonstrate any significant abnormality.IMPRESSION: No demonstrable acute intracranial abnormality.Chronic changes as described  above.Dictated by: Cindy Ceballos M.D. Signed by Cindy Ceballos M.D. on 3/23/2025 14:59  Results       Assessment & Plan   Diagnoses and all orders for this visit:    1. Nausea and vomiting, unspecified vomiting type (Primary)  -     ondansetron ODT (ZOFRAN-ODT) 4 MG disintegrating tablet; Place 1 tablet on the tongue Every 8 (Eight) Hours As Needed for Nausea or Vomiting.  Dispense: 24 tablet; Refill: 0  -     ondansetron ODT (ZOFRAN-ODT) disintegrating tablet 4 mg    2. Bruising    3. Acute pain of right shoulder  -     ketorolac (TORADOL) injection 30 mg  -     triamcinolone acetonide (KENALOG-40) injection 40 mg    4. Acute pain of right hip  -     ketorolac (TORADOL) injection 30 mg  -     triamcinolone acetonide (KENALOG-40) injection 40 mg    BP up, but in pain, will monitor  Gave zofran ODT, IM tordol and Kenalog see if helps how feels  Recommend ice shoulder, hip and knees prn  Prn - RTC if worse or no improvement.         Assessment & Plan            -Follow up:     ________________________________________  Leoncio Stoll DO, MS    Current Outpatient Medications on File Prior to Visit   Medication Sig Dispense Refill    aspirin 325 MG tablet Take 1 tablet by mouth Daily. PLEASE CALL THE OFFICE FOR AN APPT 15 tablet 0    citalopram (CeleXA) 10 MG tablet Take 1 tablet by mouth Daily.      famotidine (PEPCID) 40 MG tablet Take 1 tablet by mouth Every Night. 90 tablet 3    fluticasone (FLONASE) 50 MCG/ACT nasal spray INSTILL 2 SPRAYS INTO EACH NOSTRIL DAILY AS DIRECTED 48 g 0    ketorolac (ACULAR) 0.5 % ophthalmic solution       levothyroxine (SYNTHROID, LEVOTHROID) 112 MCG tablet Take 1 tablet by mouth Daily. 30 tablet 2    LORazepam (Ativan) 0.5 MG tablet 1 po daily prn anxiety 90 tablet 2    metoprolol succinate XL  (TOPROL-XL) 25 MG 24 hr tablet Take 1 tablet by mouth Daily. 30 tablet 2    ondansetron (Zofran) 8 MG tablet Take 1 tablet by mouth Every 8 (Eight) Hours As Needed for Nausea or Vomiting. 90 tablet 1    pantoprazole (PROTONIX) 40 MG EC tablet Take 1 tablet by mouth Daily. 90 tablet 0    vitamin B-12 (CYANOCOBALAMIN) 1000 MCG tablet Take 1 tablet by mouth Daily. 90 tablet 3    vitamin D3 125 MCG (5000 UT) capsule capsule Take 1 capsule by mouth Daily. 90 capsule 3     Current Facility-Administered Medications on File Prior to Visit   Medication Dose Route Frequency Provider Last Rate Last Admin    cyanocobalamin injection 1,000 mcg  1,000 mcg Intramuscular Q28 Days Leoncio Stoll DO   1,000 mcg at 04/20/23 1001

## 2025-04-07 RX ORDER — FAMOTIDINE 40 MG/1
40 TABLET, FILM COATED ORAL NIGHTLY
Qty: 90 TABLET | Refills: 3 | Status: SHIPPED | OUTPATIENT
Start: 2025-04-07

## 2025-04-07 RX ORDER — PANTOPRAZOLE SODIUM 40 MG/1
40 TABLET, DELAYED RELEASE ORAL DAILY
Qty: 90 TABLET | Refills: 3 | Status: SHIPPED | OUTPATIENT
Start: 2025-04-07

## 2025-04-07 NOTE — TELEPHONE ENCOUNTER
Caller: TONEY SANCHEZ    Relationship: Other    Best call back number: [phone]       Requested Prescriptions:   Requested Prescriptions     Pending Prescriptions Disp Refills    famotidine (PEPCID) 40 MG tablet 90 tablet 3     Sig: Take 1 tablet by mouth Every Night.    pantoprazole (PROTONIX) 40 MG EC tablet 90 tablet 0     Sig: Take 1 tablet by mouth Daily.      [facility]     Pharmacy where request should be sent: [place] [phone] [place] [phone]      Last office visit with prescribing clinician: 3/26/2025   Last telemedicine visit with prescribing clinician: Visit date not found   Next office visit with prescribing clinician: 8/18/2025     Additional details provided by patient:     Does the patient have less than a 3 day supply:  [x] Yes  [] No    Would you like a call back once the refill request has been completed: [] Yes [x] No    If the office needs to give you a call back, can they leave a voicemail: [] Yes [x] No    Redd Mccallum Rep   04/07/25 10:43 EDT

## 2025-04-08 DIAGNOSIS — I10 ESSENTIAL HYPERTENSION: ICD-10-CM

## 2025-04-08 DIAGNOSIS — E03.8 OTHER SPECIFIED HYPOTHYROIDISM: ICD-10-CM

## 2025-04-08 RX ORDER — METOPROLOL SUCCINATE 25 MG/1
25 TABLET, EXTENDED RELEASE ORAL DAILY
Qty: 30 TABLET | Refills: 2 | Status: SHIPPED | OUTPATIENT
Start: 2025-04-08

## 2025-04-08 RX ORDER — LEVOTHYROXINE SODIUM 112 UG/1
112 TABLET ORAL DAILY
Qty: 30 TABLET | Refills: 2 | Status: SHIPPED | OUTPATIENT
Start: 2025-04-08

## 2025-04-08 NOTE — TELEPHONE ENCOUNTER
Caller: NarcisaBradley    Relationship: Emergency Contact    Best call back number: [phone]     Requested Prescriptions:   Requested Prescriptions     Pending Prescriptions Disp Refills    levothyroxine (SYNTHROID, LEVOTHROID) 112 MCG tablet 30 tablet 2     Sig: Take 1 tablet by mouth Daily.    metoprolol succinate XL (TOPROL-XL) 25 MG 24 hr tablet 30 tablet 2     Sig: Take 1 tablet by mouth Daily.        Pharmacy where request should be sent: [place] [phone] [facility] [phone] [facility]     Last office visit with prescribing clinician: 3/26/2025   Last telemedicine visit with prescribing clinician: Visit date not found   Next office visit with prescribing clinician: 8/18/2025     Additional details provided by patient: PATIENT HAS 1 DOSE LEFT OF THESE MEDICATIONS    Does the patient have less than a 3 day supply:  [x] Yes  [] No    Redd Hernandez Rep   04/08/25 13:13 EDT

## 2025-04-22 DIAGNOSIS — I10 ESSENTIAL HYPERTENSION: ICD-10-CM

## 2025-04-22 RX ORDER — METOPROLOL SUCCINATE 25 MG/1
25 TABLET, EXTENDED RELEASE ORAL DAILY
Qty: 30 TABLET | Refills: 2 | Status: SHIPPED | OUTPATIENT
Start: 2025-04-22

## 2025-04-22 NOTE — TELEPHONE ENCOUNTER
Caller: Bradley Brewster    Relationship: Emergency Contact    Best call back number: [phone]     Requested Prescriptions:   Requested Prescriptions     Pending Prescriptions Disp Refills    metoprolol succinate XL (TOPROL-XL) 25 MG 24 hr tablet 30 tablet 2     Sig: Take 1 tablet by mouth Daily.        Pharmacy where request should be sent: [address] [phone] [facility] [phone]      Last office visit with prescribing clinician: 3/26/2025   Last telemedicine visit with prescribing clinician: Visit date not found   Next office visit with prescribing clinician: 8/18/2025     Additional details provided by patient:      Does the patient have less than a 3 day supply:  [] Yes  [] No    Would you like a call back once the refill request has been completed: [] Yes [] No    If the office needs to give you a call back, can they leave a voicemail: [] Yes [] No    Redd Lopez Rep   04/22/25 09:40 EDT

## 2025-04-25 ENCOUNTER — TELEPHONE (OUTPATIENT)
Dept: FAMILY MEDICINE CLINIC | Facility: CLINIC | Age: OVER 89
End: 2025-04-25
Payer: MEDICARE

## 2025-04-25 RX ORDER — LANOLIN ALCOHOL/MO/W.PET/CERES
1000 CREAM (GRAM) TOPICAL DAILY
Qty: 90 TABLET | Refills: 3 | Status: SHIPPED | OUTPATIENT
Start: 2025-04-25

## 2025-04-25 NOTE — TELEPHONE ENCOUNTER
Caller: Bradley Brewster    Relationship: Emergency Contact    Best call back number: [phone]     Requested Prescriptions: cyanocobalamin injection 1,000 mcg     Pharmacy where request should be sent: [address] [phone] [facility] [phone]      Last office visit with prescribing clinician: 3/26/2025   Last telemedicine visit with prescribing clinician: Visit date not found   Next office visit with prescribing clinician: 8/18/2025     Additional details provided by patient: PATIENTS FRIEND STATED THE PATIENT IS COMPLETELY OUT OF THIS MEDICATION AND HAS BEEN FOR DAYS.    Does the patient have less than a 3 day supply:  [x] Yes  [] No    Would you like a call back once the refill request has been completed: [] Yes [x] No    If the office needs to give you a call back, can they leave a voicemail: [] Yes [x] No    Redd Dahl Rep   04/25/25 10:07 EDT

## 2025-04-25 NOTE — TELEPHONE ENCOUNTER
She can go on the once a day b12 pills 1000mcg daily.  I sent this to Melinda Rosa to see if will give.  Her last b12 level looked good and so can continue on with oral, though need at least the 1000mcg daily.  RRJ

## 2025-05-01 ENCOUNTER — TELEPHONE (OUTPATIENT)
Dept: FAMILY MEDICINE CLINIC | Facility: CLINIC | Age: OVER 89
End: 2025-05-01
Payer: MEDICARE

## 2025-05-01 NOTE — TELEPHONE ENCOUNTER
Low Blood sugar concerns and shaky  Wanting to be seen as soon as possible.  Asking for tomorrow.     [phone]    Asking for Danielle

## 2025-05-02 ENCOUNTER — OFFICE VISIT (OUTPATIENT)
Dept: FAMILY MEDICINE CLINIC | Facility: CLINIC | Age: OVER 89
End: 2025-05-02
Payer: MEDICARE

## 2025-05-02 VITALS
DIASTOLIC BLOOD PRESSURE: 82 MMHG | SYSTOLIC BLOOD PRESSURE: 140 MMHG | BODY MASS INDEX: 27.48 KG/M2 | HEART RATE: 67 BPM | HEIGHT: 60 IN | WEIGHT: 140 LBS | OXYGEN SATURATION: 98 %

## 2025-05-02 DIAGNOSIS — E53.8 B12 DEFICIENCY: ICD-10-CM

## 2025-05-02 DIAGNOSIS — K21.9 GASTROESOPHAGEAL REFLUX DISEASE, UNSPECIFIED WHETHER ESOPHAGITIS PRESENT: Primary | ICD-10-CM

## 2025-05-02 DIAGNOSIS — M25.511 CHRONIC RIGHT SHOULDER PAIN: ICD-10-CM

## 2025-05-02 DIAGNOSIS — G89.29 CHRONIC RIGHT SHOULDER PAIN: ICD-10-CM

## 2025-05-02 DIAGNOSIS — K59.04 CHRONIC IDIOPATHIC CONSTIPATION: ICD-10-CM

## 2025-05-02 RX ORDER — TRIAMCINOLONE ACETONIDE 40 MG/ML
80 INJECTION, SUSPENSION INTRA-ARTICULAR; INTRAMUSCULAR ONCE
Status: COMPLETED | OUTPATIENT
Start: 2025-05-02 | End: 2025-05-02

## 2025-05-02 RX ORDER — POLYETHYLENE GLYCOL 3350 17 G/17G
17 POWDER, FOR SOLUTION ORAL DAILY
Qty: 90 PACKET | Refills: 3 | Status: SHIPPED | OUTPATIENT
Start: 2025-05-02

## 2025-05-02 RX ORDER — PANTOPRAZOLE SODIUM 40 MG/1
40 TABLET, DELAYED RELEASE ORAL 2 TIMES DAILY
Qty: 180 TABLET | Refills: 3 | Status: SHIPPED | OUTPATIENT
Start: 2025-05-02

## 2025-05-02 RX ADMIN — CYANOCOBALAMIN 1000 MCG: 1000 INJECTION, SOLUTION INTRAMUSCULAR; SUBCUTANEOUS at 10:23

## 2025-05-02 RX ADMIN — TRIAMCINOLONE ACETONIDE 80 MG: 40 INJECTION, SUSPENSION INTRA-ARTICULAR; INTRAMUSCULAR at 10:22

## 2025-05-02 NOTE — PROGRESS NOTES
Subjective   Kelvin Restrepo is a 89 y.o. female. Presents today for   Chief Complaint   Patient presents with    shaky     Asked to be worked in as wonders if blood sugars are low as shaky         History of Present Illness  History of Present Illness  The patient is a 89-year-old female who was asked to be worked in yesterday to be seen today due to feeling shaky and wondering if her blood sugars are low. She does not have a history of type 2 diabetes, and at least going back to 2015 in our system, there are no A1c's being ordered.    Generalized shakiness is reported, accompanied by nausea and reflux symptoms, but no heartburn. An upcoming appointment with Dr. Brady, a gastroenterologist, is scheduled later this year. Appetite is diminished, and eating is done out of necessity, with no pleasure in food. Famotidine has been discontinued, but pantoprazole is still being taken. No presence of black or bloody stools is reported, but constipation is experienced. Suppositories have been used for bowel movements, which have been partially effective, but straining is still required. Milk of magnesia was attempted without success, and MiraLAX has not been tried. Frequent nausea is also reported. A scheduled appointment with Dr. Brady is on 12/03/2024. Ondansetron is found helpful, and a sufficient supply is available at home.    Interest in resuming B12 injections is expressed, as they were believed to be beneficial in the past. Currently, B12 supplements are taken in pill form.    A fall on 09/23/2023 during the COVID-19 pandemic resulted in shoulder pain. Despite undergoing x-rays, the pain persists. The pain is described as aching and burning, located over the scapula.  Review of Systems   Respiratory:  Negative for shortness of breath.    Cardiovascular:  Negative for chest pain.   Gastrointestinal:  Positive for constipation and nausea. Negative for abdominal pain, anal bleeding, blood in stool and vomiting.  "      Patient Active Problem List   Diagnosis    Suprapubic pain    Allergic state    Chronic constipation    Gastroesophageal reflux disease    Fatigue    Hyperlipidemia    Hypothyroidism    Insomnia    Obstructive sleep apnea syndrome    Osteoarthritis of multiple joints    Osteoporosis    Cobalamin deficiency    Essential hypertension    Anxiety    H/O right mastectomy    H/O hyperlipidemia    History of breast cancer    History of hypothyroidism    Seasonal allergic rhinitis    Glaucoma (increased eye pressure)       Social History     Socioeconomic History    Marital status:    Tobacco Use    Smoking status: Never     Passive exposure: Never    Smokeless tobacco: Never   Vaping Use    Vaping status: Never Used   Substance and Sexual Activity    Alcohol use: No    Drug use: No    Sexual activity: Not Currently     Partners: Male     Birth control/protection: Post-menopausal       Allergies   Allergen Reactions    Amlodipine      Patient does not recall ever having a reaction to this medication     Codeine Other (See Comments)    Prednisone Hives    Atorvastatin Myalgia         Objective   Vitals:    05/02/25 0937   BP: 140/82   Pulse: 67   SpO2: 98%   Weight: 63.5 kg (140 lb)   Height: 152.4 cm (60\")     Body mass index is 27.34 kg/m².    Physical Exam  Vitals and nursing note reviewed.   Constitutional:       Appearance: She is well-developed.   HENT:      Head: Normocephalic and atraumatic.   Neck:      Thyroid: No thyromegaly.      Vascular: No JVD.   Cardiovascular:      Rate and Rhythm: Normal rate and regular rhythm.      Heart sounds: Normal heart sounds. No murmur heard.     No friction rub. No gallop.   Pulmonary:      Effort: Pulmonary effort is normal. No respiratory distress.      Breath sounds: Normal breath sounds. No wheezing or rales.   Abdominal:      General: Bowel sounds are normal. There is no distension.      Palpations: Abdomen is soft.      Tenderness: There is no abdominal " tenderness. There is no guarding or rebound.   Musculoskeletal:      Right shoulder: Tenderness present. No bony tenderness.        Arms:       Cervical back: Neck supple.   Skin:     General: Skin is warm and dry.   Neurological:      Mental Status: She is alert.      Gait: Gait normal.   Psychiatric:         Behavior: Behavior normal.         Results       Assessment & Plan   Diagnoses and all orders for this visit:    1. Gastroesophageal reflux disease, unspecified whether esophagitis present (Primary)  -     pantoprazole (PROTONIX) 40 MG EC tablet; Take 1 tablet by mouth 2 (Two) Times a Day.  Dispense: 180 tablet; Refill: 3    2. Chronic idiopathic constipation  -     polyethylene glycol (MIRALAX) 17 g packet; Take 17 g by mouth Daily.  Dispense: 90 packet; Refill: 3    3. B12 deficiency    4. Chronic right shoulder pain  -     triamcinolone acetonide (KENALOG-40) injection 80 mg    Ok monthly b12 injections  Gave 20 packets miralax to start  Increase ppi to bid  Steroid IM for shoulder       Assessment & Plan  1. Constipation.  - Significant constipation reported, requiring the use of suppositories and experiencing incomplete bowel movements.  - No abdominal pain upon palpation, but persistent nausea.  - Counseling provided on the use of MiraLAX; instructions to mix one packet with a glass of water daily, with expected results in 5 to 7 days.  - Prescription for MiraLAX will be sent to [facility].    2. Gastroesophageal Reflux Disease (GERD).  - Reflux symptoms, particularly after eating, reported; no heartburn but food regurgitation.  - Currently taking pantoprazole; stopped famotidine.  - Dosage of pantoprazole will be increased to one tablet in the morning and one at night.  - Ondansetron available for nausea; patient has a full bottle and does not need a refill.    3. Bursitis.  - Shoulder pain since a fall on 09/23/2023, with aching and burning sensations.  - Pain localized over the scapula upon  palpation.  - Kenalog injection will be administered to alleviate inflammation and pain.  - Follow-up in a few weeks to assess the effectiveness of the treatment.    4. Vitamin B12 Deficiency.  - Inquiry about resuming B12 injections, previously felt better on them.  - B12 injection will be administered today.  - Monthly B12 injections will be scheduled for ongoing management.    Follow-up  - Follow-up appointment scheduled in a few weeks to assess the effectiveness of the treatments and make any necessary adjustments.    PROCEDURE  Procedure: Kenalog injection to the right shoulder    All questions were answered and agreement to proceed was given after the following Pre-Procedure details were reviewed:  - Risks and Benefits: Discussed potential benefits of reducing inflammation and pain, and risks including potential for infection, allergic reaction, and temporary increase in pain.  - Alternative Options: Discussed physical therapy, oral anti-inflammatory medications, and continued conservative management.  - Side effects: Discussed potential side effects including localized pain, swelling, and possible systemic effects of steroids.  - Consent: Verbal consent obtained from the patient.    Intra-Procedure:  - Time-Out: Confirmed patient identity, procedure, and site.  - Site Preparation: The right shoulder area was cleaned with an antiseptic solution.  - Anesthesia: Local anesthesia was administered to minimize discomfort.  - Medication: Kenalog (triamcinolone acetonide) was injected into the right shoulder.  - Dressing: A small adhesive bandage was applied to the injection site.    Post-Procedure:  - Tolerance Level: Patient tolerated the procedure well.  - Complications: None observed.  - Home Care Instructions: Advised to monitor for signs of infection, avoid strenuous activity with the right shoulder for 24 hours, and apply ice if needed for discomfort.    Additionally, a B12 injection was administered as part  of the patient's ongoing treatment plan.          -Follow up:     ________________________________________  Leoncio Stoll DO, MS    Current Outpatient Medications on File Prior to Visit   Medication Sig Dispense Refill    aspirin 325 MG tablet Take 1 tablet by mouth Daily. PLEASE CALL THE OFFICE FOR AN APPT 15 tablet 0    citalopram (CeleXA) 10 MG tablet Take 1 tablet by mouth Daily.      fluticasone (FLONASE) 50 MCG/ACT nasal spray INSTILL 2 SPRAYS INTO EACH NOSTRIL DAILY AS DIRECTED 48 g 0    ketorolac (ACULAR) 0.5 % ophthalmic solution       levothyroxine (SYNTHROID, LEVOTHROID) 112 MCG tablet Take 1 tablet by mouth Daily. 30 tablet 2    LORazepam (Ativan) 0.5 MG tablet 1 po daily prn anxiety 90 tablet 2    metoprolol succinate XL (TOPROL-XL) 25 MG 24 hr tablet Take 1 tablet by mouth Daily. 30 tablet 2    ondansetron (Zofran) 8 MG tablet Take 1 tablet by mouth Every 8 (Eight) Hours As Needed for Nausea or Vomiting. 90 tablet 1    ondansetron ODT (ZOFRAN-ODT) 4 MG disintegrating tablet Place 1 tablet on the tongue Every 8 (Eight) Hours As Needed for Nausea or Vomiting. 24 tablet 0    pantoprazole (PROTONIX) 40 MG EC tablet Take 1 tablet by mouth Daily. 90 tablet 3    vitamin B-12 (CYANOCOBALAMIN) 1000 MCG tablet Take 1 tablet by mouth Daily. 90 tablet 3    vitamin D3 125 MCG (5000 UT) capsule capsule Take 1 capsule by mouth Daily. 90 capsule 3    famotidine (PEPCID) 40 MG tablet Take 1 tablet by mouth Every Night. (Patient not taking: Reported on 5/2/2025) 90 tablet 3     Current Facility-Administered Medications on File Prior to Visit   Medication Dose Route Frequency Provider Last Rate Last Admin    cyanocobalamin injection 1,000 mcg  1,000 mcg Intramuscular Q28 Days Leoncio Stoll DO   1,000 mcg at 04/20/23 1003

## 2025-05-08 DIAGNOSIS — R09.82 POSTNASAL DRIP: ICD-10-CM

## 2025-05-08 RX ORDER — FLUTICASONE PROPIONATE 50 MCG
2 SPRAY, SUSPENSION (ML) NASAL DAILY
Qty: 48 G | Refills: 0 | Status: SHIPPED | OUTPATIENT
Start: 2025-05-08

## 2025-05-09 ENCOUNTER — OFFICE VISIT (OUTPATIENT)
Dept: FAMILY MEDICINE CLINIC | Facility: CLINIC | Age: OVER 89
End: 2025-05-09
Payer: MEDICARE

## 2025-05-09 VITALS — SYSTOLIC BLOOD PRESSURE: 126 MMHG | DIASTOLIC BLOOD PRESSURE: 80 MMHG | HEART RATE: 84 BPM

## 2025-05-09 DIAGNOSIS — K57.92 DIVERTICULITIS: Primary | ICD-10-CM

## 2025-05-09 PROCEDURE — 99213 OFFICE O/P EST LOW 20 MIN: CPT | Performed by: NURSE PRACTITIONER

## 2025-05-09 PROCEDURE — 1126F AMNT PAIN NOTED NONE PRSNT: CPT | Performed by: NURSE PRACTITIONER

## 2025-05-09 PROCEDURE — 1159F MED LIST DOCD IN RCRD: CPT | Performed by: NURSE PRACTITIONER

## 2025-05-09 PROCEDURE — 1160F RVW MEDS BY RX/DR IN RCRD: CPT | Performed by: NURSE PRACTITIONER

## 2025-05-09 RX ORDER — METRONIDAZOLE 500 MG/1
500 TABLET ORAL 3 TIMES DAILY
COMMUNITY
End: 2025-05-14 | Stop reason: SINTOL

## 2025-05-09 RX ORDER — MAGNESIUM 200 MG
1000 TABLET ORAL
COMMUNITY
Start: 2025-05-06

## 2025-05-09 RX ORDER — SULFAMETHOXAZOLE AND TRIMETHOPRIM 800; 160 MG/1; MG/1
1 TABLET ORAL 2 TIMES DAILY
Qty: 14 TABLET | Refills: 0 | Status: SHIPPED | OUTPATIENT
Start: 2025-05-09

## 2025-05-09 NOTE — PROGRESS NOTES
Subjective   Kelvin Restrepo is a 89 y.o. female. Presents today for   Chief Complaint   Patient presents with    Hospital Follow Up Visit     [facility] [place]s Bowel [place]   Was given Flagyl and Cipro    Had severe stomach issues .       History Of Present Illness charles hyatt is a 89-year-old female presenting today with stomach problems and hospital discharge follow-up    History of Present Illness  The patient presents for evaluation of diverticulitis.    She has been experiencing persistent discomfort, which she attributes to her medication regimen. She reports that she did not take Zofran today but has been on a course of Cipro, which she believes caused her to feel unwell last night. She expresses reluctance to continue with the Cipro due to its perceived strength and the adverse effects she experienced. She recalls that she was awake until 4:00 AM, pacing in an attempt to alleviate her symptoms. She also mentions that she took her medications at different times last night, with one dose at midnight and another at 10:00 PM, which she suspects may have contributed to her illness. She was informed that her inflammation was not severe and that there was no evidence of blockage on her scan. She describes her condition as the worst she has ever felt while on medication. She also reports a decrease in appetite, particularly in the evening, which she attributes to her stomach issues. She has a history of constipation, for which she was previously advised to increase her fiber intake. This approach initially proved beneficial, but she reports that it has since ceased to be effective.    MEDICATIONS  Current: Zofran, Cipro    Patient Active Problem List   Diagnosis    Suprapubic pain    Allergic state    Chronic constipation    Gastroesophageal reflux disease    Fatigue    Hyperlipidemia    Hypothyroidism    Insomnia    Obstructive sleep apnea syndrome    Osteoarthritis of multiple joints    Osteoporosis    Cobalamin  deficiency    Essential hypertension    Anxiety    H/O right mastectomy    H/O hyperlipidemia    History of breast cancer    History of hypothyroidism    Seasonal allergic rhinitis    Glaucoma (increased eye pressure)       Social History     Socioeconomic History    Marital status:    Tobacco Use    Smoking status: Never     Passive exposure: Never    Smokeless tobacco: Never   Vaping Use    Vaping status: Never Used   Substance and Sexual Activity    Alcohol use: No    Drug use: No    Sexual activity: Not Currently     Partners: Male     Birth control/protection: Post-menopausal       Allergies   Allergen Reactions    Amlodipine      Patient does not recall ever having a reaction to this medication     Codeine Other (See Comments)    Prednisone Hives    Atorvastatin Myalgia       Current Outpatient Medications on File Prior to Visit   Medication Sig Dispense Refill    aspirin 325 MG tablet Take 1 tablet by mouth Daily. PLEASE CALL THE OFFICE FOR AN APPT 15 tablet 0    citalopram (CeleXA) 10 MG tablet Take 1 tablet by mouth Daily.      Cyanocobalamin (Vitamin B-12) 1000 MCG sublingual tablet 1 tablet.      fluticasone (FLONASE) 50 MCG/ACT nasal spray INSTILL 2 SPRAYS INTO EACH NOSTRIL DAILY AS DIRECTED 48 g 0    ketorolac (ACULAR) 0.5 % ophthalmic solution       levothyroxine (SYNTHROID, LEVOTHROID) 112 MCG tablet Take 1 tablet by mouth Daily. 30 tablet 2    LORazepam (Ativan) 0.5 MG tablet 1 po daily prn anxiety 90 tablet 2    metoprolol succinate XL (TOPROL-XL) 25 MG 24 hr tablet Take 1 tablet by mouth Daily. 30 tablet 2    metroNIDAZOLE (FLAGYL) 500 MG tablet Take 1 tablet by mouth 3 (Three) Times a Day.      ondansetron (Zofran) 8 MG tablet Take 1 tablet by mouth Every 8 (Eight) Hours As Needed for Nausea or Vomiting. 90 tablet 1    ondansetron ODT (ZOFRAN-ODT) 4 MG disintegrating tablet Place 1 tablet on the tongue Every 8 (Eight) Hours As Needed for Nausea or Vomiting. 24 tablet 0    pantoprazole  (PROTONIX) 40 MG EC tablet Take 1 tablet by mouth 2 (Two) Times a Day. 180 tablet 3    polyethylene glycol (MIRALAX) 17 g packet Take 17 g by mouth Daily. 90 packet 3    vitamin B-12 (CYANOCOBALAMIN) 1000 MCG tablet Take 1 tablet by mouth Daily. 90 tablet 3    vitamin D3 125 MCG (5000 UT) capsule capsule Take 1 capsule by mouth Daily. 90 capsule 3     Current Facility-Administered Medications on File Prior to Visit   Medication Dose Route Frequency Provider Last Rate Last Admin    cyanocobalamin injection 1,000 mcg  1,000 mcg Intramuscular Q28 Days Leoncio Stoll DO   1,000 mcg at 05/02/25 1023       Objective   Vitals:    05/09/25 1433   BP: 126/80   Pulse: 84   Weight: Comment: wheelchair     There is no height or weight on file to calculate BMI.    Physical Exam    Physical Exam  HENT:      Head: Normocephalic and atraumatic.   Cardiovascular:      Rate and Rhythm: Normal rate and regular rhythm.      Pulses: Normal pulses.      Heart sounds: Normal heart sounds.   Pulmonary:      Effort: Pulmonary effort is normal.   Abdominal:      General: Abdomen is flat.      Tenderness: There is no right CVA tenderness or left CVA tenderness.   Neurological:      Mental Status: She is alert.         Results  Imaging  Scan showed inflammation but no blockage.       Procedures     Assessment & Plan   Diagnoses and all orders for this visit:    1. Diverticulitis (Primary)    Other orders  -     sulfamethoxazole-trimethoprim (BACTRIM DS,SEPTRA DS) 800-160 MG per tablet; Take 1 tablet by mouth 2 (Two) Times a Day.  Dispense: 14 tablet; Refill: 0         Assessment & Plan  1. Diverticulitis.  She is advised to take Zofran approximately 30 minutes prior to the administration of any other medication to coat her stomach. A prescription for Bactrim (sulfamethoxazole and trimethoprim) has been issued, and she is instructed to discontinue the use of Cipro and Flagyl.                 Return in about 1 month (around 6/11/2025) for  follow up with Dr. Stoll.     Discussed Care Gaps, ordered referrals and encouraged vaccination updates.       - Pt agrees with plan of care and denies further questions/concerns today  - This document is intended for medical expert use only. Persons  reading this document without medical staff guidance may result in misinterpretation and unintended morbidity     Go to the ER for any possible life-threatening symptoms such as chest pain or shortness of air.      Please allow 3-5 business days for recommendations based on new results      I personally spent time with this patient, preparing for the visit, reviewing tests, obtaining and/or reviewing a separately obtained history, performing a medically appropriate examination and/or evaluation, counseling and educating the patient/family/caregiver, ordering medications,  documenting information in the medical record and indepentently interpreting results.         Patient or patient representative verbalized consent for the use of Ambient Listening during the visit with  VEENA Parker for chart documentation. 5/12/2025  15:15 EDT

## 2025-05-12 ENCOUNTER — TELEPHONE (OUTPATIENT)
Dept: FAMILY MEDICINE CLINIC | Facility: CLINIC | Age: OVER 89
End: 2025-05-12

## 2025-05-12 NOTE — TELEPHONE ENCOUNTER
Caller: Kelvin Restrepo    Relationship: Self    Best call back number: [phone]         Who are you requesting to speak with (clinical staff, provider,  specific staff member): CLINICAL        What was the call regarding: PATIENT STATES SHE WAS SEEN 5-9-25 AND WAS PRESCRIBED MEDICATION LISTED BELOW, MEDICATION IS MAKING PATIENT SICK WHEN SHE TAKES IT AND PATIENT WOULD LIKE TO ONLY TAKE ONE PILL A DAY INSTEAD OF TWO, PLEASE CALL BACK WITH ADVISEMENT.         sulfamethoxazole-trimethoprim (BACTRIM DS,SEPTRA DS) 800-160 MG per tablet.

## 2025-05-14 ENCOUNTER — OFFICE VISIT (OUTPATIENT)
Dept: FAMILY MEDICINE CLINIC | Facility: CLINIC | Age: OVER 89
End: 2025-05-14
Payer: MEDICARE

## 2025-05-14 VITALS
TEMPERATURE: 97.4 F | OXYGEN SATURATION: 97 % | BODY MASS INDEX: 27.34 KG/M2 | HEART RATE: 120 BPM | HEIGHT: 60 IN | DIASTOLIC BLOOD PRESSURE: 82 MMHG | SYSTOLIC BLOOD PRESSURE: 156 MMHG

## 2025-05-14 DIAGNOSIS — K57.92 ACUTE DIVERTICULITIS: ICD-10-CM

## 2025-05-14 DIAGNOSIS — R11.0 NAUSEA: ICD-10-CM

## 2025-05-14 DIAGNOSIS — E87.1 HYPONATREMIA: Primary | ICD-10-CM

## 2025-05-14 RX ORDER — SODIUM CHLORIDE 9 MG/ML
125 INJECTION, SOLUTION INTRAVENOUS ONCE
Status: COMPLETED | OUTPATIENT
Start: 2025-05-14 | End: 2025-05-14

## 2025-05-14 RX ADMIN — CYANOCOBALAMIN 1000 MCG: 1000 INJECTION, SOLUTION INTRAMUSCULAR; SUBCUTANEOUS at 11:56

## 2025-05-14 RX ADMIN — SODIUM CHLORIDE 125 ML/HR: 9 INJECTION, SOLUTION INTRAVENOUS at 13:40

## 2025-05-14 NOTE — PROGRESS NOTES
Subjective   Kelvin Restrpeo is a 89 y.o. female. Presents today for   Chief Complaint   Patient presents with    Hospital Follow Up Visit     [facility]   Low sodium    Altered Mental Status    Fatigue    Anorexia         History of Present Illness  History of Present Illness  The patient is a 89-year-old female who presents for an emergency department follow-up. She has a history of hyperlipidemia and hypothyroidism. She presented to the ER earlier today for abdominal pain, headache, nausea, and decreased appetite. She had a recent admission to [facility] with a diagnosis of diverticulitis and has been on antibiotics at home. Antibiotic changes were made from ciprofloxacin and metronidazole to Bactrim due to nausea as per ER notes.    She was released from the ER with diagnoses of hyponatremia, abdominal pain, nausea, and headache. A CT abdomen and pelvis were completed, showing no obvious source for acute abdominal discomfort, but noted cystocele, sigmoid diverticulosis, and a 3 cm cyst in the liver. A CT head was done for the headache, revealing no evidence of acute intracranial hemorrhage or mass effect, moderate chronic small vessel ischemic changes in the white matter, and mild diffuse cerebral atrophy. Labs today at the ER showed urinalysis negative, white blood cell count 6.89, hemoglobin 14.3, hematocrit 41.7, platelet count 228, sodium 125, potassium 4.3, chloride 91, creatinine 0.77, LFTs all normal, and lipase 37.    She also had a CT abdomen and pelvis with IV contrast on 05/06/2025, which noted left-sided colonic diverticulosis and mild wall thickening of the sigmoid colon that may reflect acute diverticulitis. UA on 05/06/2025 at [facility] was negative. CBC on 05/06/2025 noted white blood cell count 11.3, hemoglobin 13.9, hematocrit 41.1. CMP noted glucose 125, sodium 129, potassium 3.8, creatinine 0.66, lipase 23, and lactic acid 1.2.    The [facility] diagnosed her with abdominal pain, diverticulitis, constipation, and dehydration. She was prescribed ciprofloxacin 500 mg to take twice daily for 7 days and metronidazole 500 mg one tab three times a day for 7 days. She saw an APRN in follow-up of hospitalization on 05/09/2025, complaining of intolerance to ciprofloxacin and metronidazole and was given Bactrim. She continued to complain of nausea on 05/12/2025, and the APRN directed her to take Bactrim once a day until gone.    She was also seen in [facility] on 05/12/2025 for shoulder and neck pain not relieved by Tylenol and was diagnosed with neck strain. She was advised to continue with Tylenol and over-the-counter topicals.    She reports experiencing generalized weakness and fatigue. She was informed of her low sodium levels during her hospital visit last night, where she received intravenous fluids. Despite feeling better post-IV administration, she declined further treatment and returned home. However, her condition deteriorated this morning.    She is not experiencing any nausea or abdominal pain and has regular bowel movements, including one this morning. She reports no presence of black or bloody stools or diarrhea.    She has been experiencing muscle cramps in her legs and disorientation.  Review of Systems   Constitutional:  Positive for fatigue. Negative for chills and fever.   Gastrointestinal:  Positive for constipation and nausea. Negative for abdominal pain, blood in stool and vomiting.   Psychiatric/Behavioral:  Positive for confusion and decreased concentration.        Patient Active Problem List   Diagnosis    Suprapubic pain    Allergic state    Chronic constipation    Gastroesophageal reflux disease    Fatigue    Hyperlipidemia    Hypothyroidism    Insomnia    Obstructive sleep apnea syndrome    Osteoarthritis of multiple joints    Osteoporosis    Cobalamin deficiency    Essential hypertension    Anxiety     "H/O right mastectomy    H/O hyperlipidemia    History of breast cancer    History of hypothyroidism    Seasonal allergic rhinitis    Glaucoma (increased eye pressure)       Social History     Socioeconomic History    Marital status:    Tobacco Use    Smoking status: Never     Passive exposure: Never    Smokeless tobacco: Never   Vaping Use    Vaping status: Never Used   Substance and Sexual Activity    Alcohol use: No    Drug use: No    Sexual activity: Not Currently     Partners: Male     Birth control/protection: Post-menopausal       Allergies   Allergen Reactions    Amlodipine      Patient does not recall ever having a reaction to this medication     Codeine Other (See Comments)    Prednisone Hives    Atorvastatin Myalgia         Objective   Vitals:    05/14/25 0942   BP: 156/82   Pulse: 120   Temp: 97.4 °F (36.3 °C)   TempSrc: Oral   SpO2: 97%   Weight: Comment: pt can't stand to be weighed   Height: 152.4 cm (60\")     Body mass index is 27.34 kg/m².    Physical Exam  Vitals and nursing note reviewed.   Constitutional:       Appearance: She is well-developed.   HENT:      Head: Normocephalic and atraumatic.   Neck:      Thyroid: No thyromegaly.      Vascular: No JVD.   Cardiovascular:      Rate and Rhythm: Normal rate and regular rhythm.      Heart sounds: Normal heart sounds. No murmur heard.     No friction rub. No gallop.   Pulmonary:      Effort: Pulmonary effort is normal. No respiratory distress.      Breath sounds: Normal breath sounds. No wheezing or rales.   Abdominal:      General: Bowel sounds are normal. There is no distension.      Palpations: Abdomen is soft.      Tenderness: There is no abdominal tenderness. There is no guarding or rebound.   Musculoskeletal:      Cervical back: Neck supple.   Skin:     General: Skin is warm and dry.   Neurological:      Mental Status: She is alert.      Gait: Gait normal.   Psychiatric:         Behavior: Behavior normal.       Physical " Exam  Gastrointestinal: Soft, no tenderness, no distention, no masses  Other: Blood pressure slightly elevated    Results  Labs   - White blood cell count: 6.89   - Hemoglobin: 14.3   - Hematocrit: 41.7   - Platelet count: 228   - Sodium: 125   - Potassium: 4.3   - Chloride: 91   - Creatinine: 0.77   - LFTs: Normal   - Lipase: 37   - Urinalysis: Negative    Imaging   - CT abdomen and pelvis: No obvious source for acute abdominal discomfort, cystocele, sigmoid diverticulosis, and a 3 cm cyst in the liver   - CT head: No evidence of acute intracranial hemorrhage or mass effect, moderate chronic small vessel ischemic changes in the white matter and mild diffuse cerebral atrophy     Assessment & Plan   Diagnoses and all orders for this visit:    1. Hyponatremia (Primary)  -     sodium chloride 0.9 % infusion  -     Basic Metabolic Panel    2. Acute diverticulitis    3. Nausea      Total time > 60 minutes     Assessment & Plan  1. Hyponatremia.  - Sodium level was significantly low at 125, causing confusion and muscle cramps.  - Given 1000 mL of normal saline over 4 hours to prevent rapid correction and potential central pontine myelolysis.  - Basic metabolic profile was sent off to monitor sodium levels.  - Felt significantly better and more alert after the infusion.    2. Abdominal pain.  - Presented with abdominal pain but reported improvement after receiving IV fluids.  - CT abdomen and pelvis showed no obvious source for acute abdominal discomfort, but noted cystocele, sigmoid diverticulosis, and a 3 cm cyst in the liver.  - Belly is benign on examination, and no additional antibiotics are needed.  - No black or bloody stools or diarrhea reported.    3. Nausea.  - Experienced nausea but reported no current symptoms as she had not eaten anything.  - Given Ensure complete and some lunch during her visit.  - No further nausea reported after eating.    4. Headache.  - Presented with a headache.  - CT head showed no  evidence of acute intracranial hemorrhage or mass effect, but moderate chronic small vessel ischemic changes in the white matter and mild diffuse cerebral atrophy were noted.  - No additional treatment required at this time.    5. Diverticulitis.  - Recent diagnosis of diverticulitis and treated with antibiotics.  - Follow-up CT abdomen and pelvis with IV contrast on 05/06/2025 noted left-sided colonic diverticulosis and mild wall thickening of the sigmoid colon, possibly reflecting acute diverticulitis.  - Initially prescribed ciprofloxacin and metronidazole but switched to Bactrim due to intolerance.  - No further antibiotics needed as diverticulitis appears to have cleared up.    6. Neck strain.  - Previously diagnosed with neck strain and advised to continue with Tylenol and over-the-counter topicals.  - No additional treatment required at this time.    Follow-up  - Follow-up in 2 weeks to reevaluate condition.    PROCEDURE  Procedure: Intravenous fluid infusion    All questions were answered and agreement to proceed was given after the following Pre-Procedure details were reviewed:  - Risks and Benefits: Risks of IV fluid infusion including infection, phlebitis, and fluid overload; benefits include correction of hyponatremia and improved hydration status.  - Alternative Options: Oral rehydration and monitoring without intervention.  - Side effects: Potential for discomfort at the IV site, risk of rapid sodium correction leading to central pontine myelolysis.  - Consent: Verbal consent obtained from the patient.    Intra-Procedure:  - Site Preparation: Standard aseptic technique was used for IV insertion.  - Medication: 1000 mL of normal saline infused slowly over 4 hours.  - Dressing: Standard IV dressing applied.    Post-Procedure:  - Tolerance Level: Patient tolerated the procedure well.  - Home Care Instructions: Monitor for signs of infection at the IV site, maintain adequate hydration, follow-up in 2 weeks  for reevaluation.          -Follow up:     ________________________________________  Leoncio Stoll DO, MS    Current Outpatient Medications on File Prior to Visit   Medication Sig Dispense Refill    aspirin 325 MG tablet Take 1 tablet by mouth Daily. PLEASE CALL THE OFFICE FOR AN APPT 15 tablet 0    citalopram (CeleXA) 10 MG tablet Take 1 tablet by mouth Daily.      Cyanocobalamin (Vitamin B-12) 1000 MCG sublingual tablet 1 tablet.      fluticasone (FLONASE) 50 MCG/ACT nasal spray INSTILL 2 SPRAYS INTO EACH NOSTRIL DAILY AS DIRECTED 48 g 0    ketorolac (ACULAR) 0.5 % ophthalmic solution       levothyroxine (SYNTHROID, LEVOTHROID) 112 MCG tablet Take 1 tablet by mouth Daily. 30 tablet 2    LORazepam (Ativan) 0.5 MG tablet 1 po daily prn anxiety 90 tablet 2    metoprolol succinate XL (TOPROL-XL) 25 MG 24 hr tablet Take 1 tablet by mouth Daily. 30 tablet 2    ondansetron (Zofran) 8 MG tablet Take 1 tablet by mouth Every 8 (Eight) Hours As Needed for Nausea or Vomiting. 90 tablet 1    ondansetron ODT (ZOFRAN-ODT) 4 MG disintegrating tablet Place 1 tablet on the tongue Every 8 (Eight) Hours As Needed for Nausea or Vomiting. 24 tablet 0    pantoprazole (PROTONIX) 40 MG EC tablet Take 1 tablet by mouth 2 (Two) Times a Day. 180 tablet 3    polyethylene glycol (MIRALAX) 17 g packet Take 17 g by mouth Daily. 90 packet 3    vitamin B-12 (CYANOCOBALAMIN) 1000 MCG tablet Take 1 tablet by mouth Daily. 90 tablet 3    vitamin D3 125 MCG (5000 UT) capsule capsule Take 1 capsule by mouth Daily. 90 capsule 3    sulfamethoxazole-trimethoprim (BACTRIM DS,SEPTRA DS) 800-160 MG per tablet Take 1 tablet by mouth 2 (Two) Times a Day. (Patient not taking: Reported on 5/14/2025) 14 tablet 0    [DISCONTINUED] metroNIDAZOLE (FLAGYL) 500 MG tablet Take 1 tablet by mouth 3 (Three) Times a Day. (Patient not taking: Reported on 5/14/2025)       Current Facility-Administered Medications on File Prior to Visit   Medication Dose Route Frequency  Provider Last Rate Last Admin    cyanocobalamin injection 1,000 mcg  1,000 mcg Intramuscular Q28 Days Leoncio Stoll DO   1,000 mcg at 05/14/25 0826

## 2025-05-15 LAB
BUN SERPL-MCNC: 9 MG/DL (ref 8–23)
BUN/CREAT SERPL: 11 (ref 7–25)
CALCIUM SERPL-MCNC: 9 MG/DL (ref 8.2–9.6)
CHLORIDE SERPL-SCNC: 95 MMOL/L (ref 98–107)
CO2 SERPL-SCNC: 24.4 MMOL/L (ref 22–29)
CREAT SERPL-MCNC: 0.82 MG/DL (ref 0.57–1)
EGFRCR SERPLBLD CKD-EPI 2021: 66.4 ML/MIN/1.73
GLUCOSE SERPL-MCNC: 92 MG/DL (ref 65–99)
POTASSIUM SERPL-SCNC: 4.3 MMOL/L (ref 3.5–5.2)
SODIUM SERPL-SCNC: 128 MMOL/L (ref 136–145)

## 2025-05-19 ENCOUNTER — TELEPHONE (OUTPATIENT)
Dept: FAMILY MEDICINE CLINIC | Facility: CLINIC | Age: OVER 89
End: 2025-05-19
Payer: MEDICARE

## 2025-05-19 NOTE — TELEPHONE ENCOUNTER
I talked to pt and her nausea is better today. She said the med you gave her is working better than what the hospital gave her. She will let us know if she needs more of the ondansertron 8mg tablet.

## 2025-05-19 NOTE — TELEPHONE ENCOUNTER
Patient needs Danielle to call her back about nausea medication she was given that is not helping her. She was sick all day yesterday.    Yamila gave her the RX. She'd like [facility] to send her something and she also would like to speak to you.    Please call her at [phone]

## 2025-05-28 ENCOUNTER — OFFICE VISIT (OUTPATIENT)
Dept: FAMILY MEDICINE CLINIC | Facility: CLINIC | Age: OVER 89
End: 2025-05-28
Payer: MEDICARE

## 2025-05-28 ENCOUNTER — TELEPHONE (OUTPATIENT)
Dept: FAMILY MEDICINE CLINIC | Facility: CLINIC | Age: OVER 89
End: 2025-05-28

## 2025-05-28 VITALS
OXYGEN SATURATION: 93 % | BODY MASS INDEX: 27.34 KG/M2 | HEIGHT: 60 IN | DIASTOLIC BLOOD PRESSURE: 76 MMHG | SYSTOLIC BLOOD PRESSURE: 130 MMHG | HEART RATE: 75 BPM

## 2025-05-28 DIAGNOSIS — E87.1 HYPONATREMIA: Primary | ICD-10-CM

## 2025-05-28 DIAGNOSIS — F41.1 GAD (GENERALIZED ANXIETY DISORDER): ICD-10-CM

## 2025-05-28 NOTE — TELEPHONE ENCOUNTER
Caller: Marily Brewster    Relationship: Emergency Contact    Best call back number: [phone]     What is the best time to reach you: ANY    Who are you requesting to speak with (clinical staff, provider,  specific staff member): PROVIDER    Do you know the name of the person who called: MARILY     What was the call regarding: CALLER STATED THAT PATIENT IS NO LONGER TAKING CITALOPRAM.    Is it okay if the provider responds through MyChart: NO

## 2025-05-28 NOTE — PROGRESS NOTES
Subjective   Kelvin Restrepo is a 89 y.o. female. Presents today for   Chief Complaint   Patient presents with    Hospital Follow Up Visit     [facility]     Abdominal Pain         History of Present Illness  History of Present Illness  The patient is a 89-year-old female here for a 2-week follow-up from 05/14/2025. She was seen several hours after being in the ER for follow-up of diverticulitis and severe nausea, with hyponatremia noted on labs. During that visit, she received IV fluids and reported improvement with antibiotics and antiemetic medication.    Since then, she returned to the ER on 05/17/2025, feeling tired and not herself, with high volume diarrhea. She has been feeling unwell since having COVID-19 in April 2025. Per ER notes, she felt better at the time of release. However, she returned to the ER again yesterday, still not feeling herself, with notes pending.    She experienced an anxiety attack during her hospital visit, characterized by difficulty breathing and episodes of shaking. Fatigue and weakness led her to visit urgent care. She has been on citalopram 10 mg for anxiety for an extended period. She has been consuming salt directly from her hand to manage her symptoms and maintains adequate hydration.    Bowel movements have been regular without blood in the stool. She uses polyethylene glycol as a laxative but discontinued it for a day or two due to thin stool consistency.    Since her COVID-19 infection in April 2025, she has experienced a loss of appetite and forces herself to eat.  Review of Systems   Constitutional:  Positive for fatigue.   Respiratory:  Negative for shortness of breath.    Cardiovascular:  Negative for chest pain and palpitations.   Gastrointestinal:  Positive for constipation and nausea. Negative for abdominal pain, blood in stool and vomiting.   Psychiatric/Behavioral:  Positive for confusion and decreased concentration. The patient is nervous/anxious.        Patient Active  "Problem List   Diagnosis    Suprapubic pain    Allergic state    Chronic constipation    Gastroesophageal reflux disease    Fatigue    Hyperlipidemia    Hypothyroidism    Insomnia    Obstructive sleep apnea syndrome    Osteoarthritis of multiple joints    Osteoporosis    Cobalamin deficiency    Essential hypertension    Anxiety    H/O right mastectomy    H/O hyperlipidemia    History of breast cancer    History of hypothyroidism    Seasonal allergic rhinitis    Glaucoma (increased eye pressure)       Social History     Socioeconomic History    Marital status:    Tobacco Use    Smoking status: Never     Passive exposure: Never    Smokeless tobacco: Never   Vaping Use    Vaping status: Never Used   Substance and Sexual Activity    Alcohol use: No    Drug use: No    Sexual activity: Not Currently     Partners: Male     Birth control/protection: Post-menopausal       Allergies   Allergen Reactions    Amlodipine      Patient does not recall ever having a reaction to this medication     Codeine Other (See Comments)    Prednisone Hives    Atorvastatin Myalgia         Objective   Vitals:    05/28/25 1229   BP: 130/76   Pulse: 75   SpO2: 93%   Weight: Comment: pt wheelchair bound   Height: 152.4 cm (60\")     Body mass index is 27.34 kg/m².    Physical Exam  Vitals and nursing note reviewed.   Constitutional:       Appearance: Normal appearance. She is not toxic-appearing or diaphoretic.   HENT:      Head: Normocephalic and atraumatic.   Musculoskeletal:      Cervical back: Neck supple.   Skin:     General: Skin is warm and dry.      Capillary Refill: Capillary refill takes less than 2 seconds.   Neurological:      Mental Status: She is alert.   Psychiatric:         Mood and Affect: Mood normal.         Behavior: Behavior normal.       Physical Exam      Results  Labs   - Sodium: 05/14/2025, 125   - Sodium: 05/14/2025, 128 after IV fluids   - Sodium: 05/17/2025, 128   - Sodium: 128   - TSH: 2.29   - Creatinine: " 05/17/2025, 0.69   - Creatinine: 0.73   - White blood cell count: 7.02   - Hemoglobin: 12.9   - Hematocrit: 39.5   - White blood cell count: 6.36   - Hemoglobin: 30.6   - Hematocrit: 41   - Lipase: 42   - UA with micro: 05/17/2025, Negative   - UA with micro: Negative    Imaging   - CT abdomen and pelvis: 05/06/2025, Left-sided chronic diverticulosis, mild wall thickening of the sigmoid colon which would reflect acute diverticulitis   - CT abdomen and pelvis: 05/14/2025, Sigmoid diverticulosis but no further wall thickening     Assessment & Plan   Diagnoses and all orders for this visit:    1. Hyponatremia (Primary)  -     Sodium, Urine, Random - Urine, Clean Catch  -     Creatinine Urine Random (kidney function) GFR component - Urine, Clean Catch  -     Osmolality, Urine - Urine, Clean Catch  -     Osmolality, Serum  -     Basic Metabolic Panel    2. DAVIN (generalized anxiety disorder)           Assessment & Plan  1. Hyponatremia.  - Persistent low sodium levels could be attributed to her medication regimen, specifically citalopram 10 mg, which she has been taking for an extended period.  - Low sodium levels may also be a consequence of her recent bout of diarrhea and diverticulitis, leading to dehydration.   - COVID-19 infection in April 2025 could be contributing to her fatigue, although this is not definitive.  - Gradually wean off citalopram over a period of 2 weeks, after which the medication will be discontinued. Blood and urine tests will be conducted today to further investigate the cause of her low sodium.  2. Anxiety.  - Citalopram 10 mg may be contributing to her low sodium levels and does not appear to be effectively managing her anxiety symptoms.  - Gradually wean off citalopram over a period of 2 weeks, after which the medication will be discontinued.  - Alternative medications for anxiety will be considered if necessary.  -she is not certain actually taking and not on med list from ED;  To call and  let us know if taking or now.   - Continue lorazepam regimen.    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  - Ongoing fatigue may be related to her COVID-19 infection in April 2025. This condition, known as long-hauler COVID-19, can cause prolonged fatigue and other symptoms.  - Advised that recovery can be variable and may take several months.    Follow-up  The patient is scheduled for a follow-up visit in 4 weeks.          -Follow up:     ________________________________________  Leoncio Stoll DO, MS    Current Outpatient Medications on File Prior to Visit   Medication Sig Dispense Refill    aspirin 325 MG tablet Take 1 tablet by mouth Daily. PLEASE CALL THE OFFICE FOR AN APPT 15 tablet 0    citalopram (CeleXA) 10 MG tablet Take 1 tablet by mouth Daily.      Cyanocobalamin (Vitamin B-12) 1000 MCG sublingual tablet 1 tablet.      fluticasone (FLONASE) 50 MCG/ACT nasal spray INSTILL 2 SPRAYS INTO EACH NOSTRIL DAILY AS DIRECTED 48 g 0    ketorolac (ACULAR) 0.5 % ophthalmic solution       levothyroxine (SYNTHROID, LEVOTHROID) 112 MCG tablet Take 1 tablet by mouth Daily. 30 tablet 2    LORazepam (Ativan) 0.5 MG tablet 1 po daily prn anxiety 90 tablet 2    metoprolol succinate XL (TOPROL-XL) 25 MG 24 hr tablet Take 1 tablet by mouth Daily. 30 tablet 2    ondansetron (Zofran) 8 MG tablet Take 1 tablet by mouth Every 8 (Eight) Hours As Needed for Nausea or Vomiting. 90 tablet 1    ondansetron ODT (ZOFRAN-ODT) 4 MG disintegrating tablet Place 1 tablet on the tongue Every 8 (Eight) Hours As Needed for Nausea or Vomiting. 24 tablet 0    pantoprazole (PROTONIX) 40 MG EC tablet Take 1 tablet by mouth 2 (Two) Times a Day. 180 tablet 3    polyethylene glycol (MIRALAX) 17 g packet Take 17 g by mouth Daily. 90 packet 3    sulfamethoxazole-trimethoprim (BACTRIM DS,SEPTRA DS) 800-160 MG per tablet Take 1 tablet by mouth 2 (Two) Times a Day. 14 tablet 0    vitamin B-12 (CYANOCOBALAMIN) 1000 MCG tablet Take 1 tablet by mouth Daily. 90 tablet 3     vitamin D3 125 MCG (5000 UT) capsule capsule Take 1 capsule by mouth Daily. 90 capsule 3     Current Facility-Administered Medications on File Prior to Visit   Medication Dose Route Frequency Provider Last Rate Last Admin    cyanocobalamin injection 1,000 mcg  1,000 mcg Intramuscular Q28 Days Leoncio Stoll DO   1,000 mcg at 05/14/25 1157

## 2025-06-05 LAB
BUN SERPL-MCNC: 15 MG/DL (ref 10–36)
BUN/CREAT SERPL: 20 (ref 12–28)
CALCIUM SERPL-MCNC: 9.4 MG/DL (ref 8.7–10.3)
CHLORIDE SERPL-SCNC: 93 MMOL/L (ref 96–106)
CO2 SERPL-SCNC: 20 MMOL/L (ref 20–29)
CREAT SERPL-MCNC: 0.76 MG/DL (ref 0.57–1)
CREAT UR-MCNC: 33 MG/DL
EGFRCR SERPLBLD CKD-EPI 2021: 73 ML/MIN/1.73
GLUCOSE SERPL-MCNC: 89 MG/DL (ref 70–99)
OSMOLALITY SERPL: 273 MOSMOL/KG (ref 280–301)
OSMOLALITY UR: 255 MOSMOL/KG
POTASSIUM SERPL-SCNC: 4.2 MMOL/L (ref 3.5–5.2)
SODIUM SERPL-SCNC: 132 MMOL/L (ref 134–144)
SODIUM UR-SCNC: 53 MMOL/L

## 2025-06-09 DIAGNOSIS — R53.83 FATIGUE, UNSPECIFIED TYPE: Primary | ICD-10-CM

## 2025-06-09 DIAGNOSIS — E87.1 HYPONATREMIA: ICD-10-CM

## 2025-06-11 ENCOUNTER — OFFICE VISIT (OUTPATIENT)
Dept: FAMILY MEDICINE CLINIC | Facility: CLINIC | Age: OVER 89
End: 2025-06-11
Payer: MEDICARE

## 2025-06-11 VITALS
HEIGHT: 60 IN | WEIGHT: 138 LBS | OXYGEN SATURATION: 94 % | BODY MASS INDEX: 27.09 KG/M2 | HEART RATE: 73 BPM | DIASTOLIC BLOOD PRESSURE: 80 MMHG | SYSTOLIC BLOOD PRESSURE: 128 MMHG

## 2025-06-11 DIAGNOSIS — E03.8 OTHER SPECIFIED HYPOTHYROIDISM: ICD-10-CM

## 2025-06-11 DIAGNOSIS — R63.4 ABNORMAL WEIGHT LOSS: ICD-10-CM

## 2025-06-11 DIAGNOSIS — R53.83 OTHER FATIGUE: Primary | ICD-10-CM

## 2025-06-11 DIAGNOSIS — R06.02 SHORTNESS OF BREATH: ICD-10-CM

## 2025-06-11 DIAGNOSIS — L98.9 SKIN LESION: ICD-10-CM

## 2025-06-11 DIAGNOSIS — E87.1 HYPONATREMIA: ICD-10-CM

## 2025-06-11 DIAGNOSIS — E53.8 COBALAMIN DEFICIENCY: ICD-10-CM

## 2025-06-11 RX ADMIN — CYANOCOBALAMIN 1000 MCG: 1000 INJECTION, SOLUTION INTRAMUSCULAR; SUBCUTANEOUS at 14:51

## 2025-06-11 NOTE — PROGRESS NOTES
Subjective   Kelvin Restrepo is a 89 y.o. female. Presents today for   Chief Complaint   Patient presents with    hyponatremia    Fall     Last evening stumbled over a stool  some bruising  Elbow and upper chest    Did not have to go to Ed or anything         History of Present Illness  History of Present Illness  The patient is a 89-year-old female here for follow-up for hyponatremia and fatigue, which is more than her usual baseline. She has not been on any medications that would suggest a cause other than her hyperthyroidism, which is due to be checked today along with her sodium level.    She continues to experience persistent fatigue, which she attributes to either low blood sugar or sodium levels. She has been attempting to increase her salt intake slightly. Her appetite remains poor, and she often lacks the energy to prepare meals, necessitating support from the stove or cabinet while cooking. She also reports frequent nausea, for which she took medication this morning.    She experienced a fall yesterday afternoon after tripping over a stool in her bedroom, resulting in bruising. She was unable to rise without assistance and required help from her son. She reports no cough or shortness of breath but did experience an anxiety attack during the incident, which was managed with lorazepam.    She has noticed a change in a skin lesion, which she suspects may be skin cancer. She does not currently see a dermatologist.    She is requesting a B12 injection today.  Review of Systems   Constitutional:  Positive for appetite change, fatigue and unexpected weight change.   Respiratory:  Positive for cough and shortness of breath.    Cardiovascular:  Negative for chest pain and palpitations.   Gastrointestinal:  Positive for nausea. Negative for abdominal pain.       Patient Active Problem List   Diagnosis    Suprapubic pain    Allergic state    Chronic constipation    Gastroesophageal reflux disease    Fatigue     "Hyperlipidemia    Hypothyroidism    Insomnia    Obstructive sleep apnea syndrome    Osteoarthritis of multiple joints    Osteoporosis    Cobalamin deficiency    Essential hypertension    Anxiety    H/O right mastectomy    H/O hyperlipidemia    History of breast cancer    History of hypothyroidism    Seasonal allergic rhinitis    Glaucoma (increased eye pressure)       Social History     Socioeconomic History    Marital status:    Tobacco Use    Smoking status: Never     Passive exposure: Never    Smokeless tobacco: Never   Vaping Use    Vaping status: Never Used   Substance and Sexual Activity    Alcohol use: No    Drug use: No    Sexual activity: Not Currently     Partners: Male     Birth control/protection: Post-menopausal       Allergies   Allergen Reactions    Amlodipine      Patient does not recall ever having a reaction to this medication     Codeine Other (See Comments)    Prednisone Hives    Atorvastatin Myalgia         Objective   Vitals:    06/11/25 1355   BP: 128/80   Pulse: 73   SpO2: 94%   Weight: 62.6 kg (138 lb)   Height: 152.4 cm (60\")     Body mass index is 26.95 kg/m².    Physical Exam  Vitals and nursing note reviewed.   Constitutional:       Appearance: She is well-developed.   HENT:      Head: Normocephalic and atraumatic.   Neck:      Thyroid: No thyromegaly.      Vascular: No JVD.   Cardiovascular:      Rate and Rhythm: Normal rate and regular rhythm.      Heart sounds: Normal heart sounds. No murmur heard.     No friction rub. No gallop.   Pulmonary:      Effort: Pulmonary effort is normal. No respiratory distress.      Breath sounds: Normal breath sounds. No wheezing or rales.   Abdominal:      General: Bowel sounds are normal. There is no distension.      Palpations: Abdomen is soft.      Tenderness: There is no abdominal tenderness. There is no guarding or rebound.   Musculoskeletal:      Cervical back: Neck supple.   Skin:     General: Skin is warm and dry.      Comments: Left " wrist scaly skin lesion   Neurological:      Mental Status: She is alert.      Gait: Gait normal.   Psychiatric:         Behavior: Behavior normal.       Physical Exam      Results  Labs   - Sodium level: 05/27/2025, 128   - Sodium level: 05/29/2025, 132   - Sodium level: 05/20/2025, 127   - Serum osmolality: 05/29/2025, 273 (low)   - Urine osmolality: 05/29/2025, 255   - Urine creatinine: 05/29/2025, 33.0   - Urine sodium: 05/29/2025, 53   - Creatinine: 05/29/2025, 0.76   - Potassium: 05/29/2025, 4.2   - Calcium: 05/29/2025, 9.4     Assessment & Plan   Diagnoses and all orders for this visit:    1. Other fatigue (Primary)  -     Sodium, Urine, Random - Urine, Clean Catch  -     Creatinine Urine Random (kidney function) GFR component - Urine, Clean Catch  -     Osmolality, Urine - Urine, Clean Catch  -     Osmolality, Serum  -     Basic Metabolic Panel  -     TSH  -     ACTH  -     Salivary Cortisol, MS - Saliva, Oral Cavity    2. Hyponatremia  -     Sodium, Urine, Random - Urine, Clean Catch  -     Creatinine Urine Random (kidney function) GFR component - Urine, Clean Catch  -     Osmolality, Urine - Urine, Clean Catch  -     Osmolality, Serum  -     Basic Metabolic Panel  -     TSH  -     ACTH  -     Salivary Cortisol, MS - Saliva, Oral Cavity  -     Cancel: CT Chest Without Contrast; Future  -     CT Chest Without Contrast; Future    3. Other specified hypothyroidism  -     TSH    4. Abnormal weight loss  -     Cancel: CT Chest Without Contrast; Future  -     CT Chest Without Contrast; Future    5. Shortness of breath  -     Cancel: CT Chest Without Contrast; Future  -     CT Chest Without Contrast; Future    6. Skin lesion  -     Ambulatory Referral to Dermatology    7. Cobalamin deficiency           Assessment & Plan  1. Hyponatremia.  - Sodium levels have shown an upward trend, with the most recent reading at 132.  - Renal function is within normal limits, and current medication regimen does not typically pose  a significant risk for hyponatremia.  - - Re-evaluation of thyroid function and sodium levels will be conducted today. Urine tests will be performed. A CT scan of the chest will be ordered to rule out malignancy as a potential cause.    2. Fatigue.  - Reports feeling more fatigued than usual, which could be related to hyponatremia or hyperthyroidism.  - Thyroid function will be rechecked today to determine if it is contributing to symptoms.  - Advised to continue monitoring energy levels and report any significant changes.    3. Weight loss.  - Experienced some weight loss, which could be related to hyponatremia or other underlying conditions.  - A CT scan of the chest will be ordered to rule out malignancy as a potential cause.  - Advised to maintain a balanced diet and monitor weight closely.    4. Nausea.  - Reports feeling nauseated frequently and took a nausea pill this morning.  - Advised to continue taking nausea medication as needed and to report any persistent or worsening symptoms.    5. Anxiety.  - Experienced an anxiety attack recently and took lorazepam, which helped alleviate symptoms.  - Advised to continue using lorazepam as needed for anxiety attacks and to report any changes in anxiety levels.    6. Skin lesion.  - Noticed a change in a skin lesion and suspects it might be skin cancer.  - A referral to a dermatologist will be made for further evaluation and management of the skin lesion.    7. Health Maintenance - low B12  - Will receive a B12 injection today to help with overall well-being.     Gave information on hiring a personal care attendant to help at home with ADLs     -Follow up: 6 to 8 weeks and prn     ________________________________________  Leoncio Stoll DO, MS    Current Outpatient Medications on File Prior to Visit   Medication Sig Dispense Refill    aspirin 325 MG tablet Take 1 tablet by mouth Daily. PLEASE CALL THE OFFICE FOR AN APPT 15 tablet 0    Cyanocobalamin (Vitamin  B-12) 1000 MCG sublingual tablet 1 tablet.      fluticasone (FLONASE) 50 MCG/ACT nasal spray INSTILL 2 SPRAYS INTO EACH NOSTRIL DAILY AS DIRECTED 48 g 0    ketorolac (ACULAR) 0.5 % ophthalmic solution       levothyroxine (SYNTHROID, LEVOTHROID) 112 MCG tablet Take 1 tablet by mouth Daily. 30 tablet 2    LORazepam (Ativan) 0.5 MG tablet 1 po daily prn anxiety 90 tablet 2    metoprolol succinate XL (TOPROL-XL) 25 MG 24 hr tablet Take 1 tablet by mouth Daily. 30 tablet 2    ondansetron (Zofran) 8 MG tablet Take 1 tablet by mouth Every 8 (Eight) Hours As Needed for Nausea or Vomiting. 90 tablet 1    ondansetron ODT (ZOFRAN-ODT) 4 MG disintegrating tablet Place 1 tablet on the tongue Every 8 (Eight) Hours As Needed for Nausea or Vomiting. 24 tablet 0    pantoprazole (PROTONIX) 40 MG EC tablet Take 1 tablet by mouth 2 (Two) Times a Day. 180 tablet 3    polyethylene glycol (MIRALAX) 17 g packet Take 17 g by mouth Daily. 90 packet 3    vitamin B-12 (CYANOCOBALAMIN) 1000 MCG tablet Take 1 tablet by mouth Daily. 90 tablet 3    vitamin D3 125 MCG (5000 UT) capsule capsule Take 1 capsule by mouth Daily. 90 capsule 3     Current Facility-Administered Medications on File Prior to Visit   Medication Dose Route Frequency Provider Last Rate Last Admin    cyanocobalamin injection 1,000 mcg  1,000 mcg Intramuscular Q28 Days Leoncio Stoll DO   1,000 mcg at 05/14/25 8359

## 2025-06-17 ENCOUNTER — TELEPHONE (OUTPATIENT)
Dept: FAMILY MEDICINE CLINIC | Facility: CLINIC | Age: OVER 89
End: 2025-06-17
Payer: MEDICARE

## 2025-06-17 NOTE — TELEPHONE ENCOUNTER
Oscar Brewster calling for Danielle  Please call him at [phone]  States patient is sick and will only see Dr Stoll

## 2025-06-22 LAB
ACTH PLAS-MCNC: 13.4 PG/ML (ref 7.2–63.3)
BUN SERPL-MCNC: 12 MG/DL (ref 10–36)
BUN/CREAT SERPL: 16 (ref 12–28)
CALCIUM SERPL-MCNC: 9.7 MG/DL (ref 8.7–10.3)
CHLORIDE SERPL-SCNC: 88 MMOL/L (ref 96–106)
CO2 SERPL-SCNC: 22 MMOL/L (ref 20–29)
CORTIS SAL-MCNC: 0.27 UG/DL
CREAT SERPL-MCNC: 0.75 MG/DL (ref 0.57–1)
CREAT UR-MCNC: 31.4 MG/DL
EGFRCR SERPLBLD CKD-EPI 2021: 74 ML/MIN/1.73
GLUCOSE SERPL-MCNC: 98 MG/DL (ref 70–99)
OSMOLALITY SERPL: 258 MOSMOL/KG (ref 280–301)
OSMOLALITY UR: 227 MOSMOL/KG
POTASSIUM SERPL-SCNC: 4.1 MMOL/L (ref 3.5–5.2)
SODIUM SERPL-SCNC: 126 MMOL/L (ref 134–144)
SODIUM UR-SCNC: 29 MMOL/L
TSH SERPL DL<=0.005 MIU/L-ACNC: 2.66 UIU/ML (ref 0.45–4.5)

## 2025-06-25 ENCOUNTER — OFFICE VISIT (OUTPATIENT)
Dept: FAMILY MEDICINE CLINIC | Facility: CLINIC | Age: OVER 89
End: 2025-06-25
Payer: MEDICARE

## 2025-06-25 VITALS
BODY MASS INDEX: 26.95 KG/M2 | SYSTOLIC BLOOD PRESSURE: 134 MMHG | OXYGEN SATURATION: 99 % | HEART RATE: 65 BPM | HEIGHT: 60 IN | DIASTOLIC BLOOD PRESSURE: 76 MMHG

## 2025-06-25 DIAGNOSIS — R63.4 ABNORMAL WEIGHT LOSS: Primary | ICD-10-CM

## 2025-06-25 DIAGNOSIS — R05.3 CHRONIC COUGH: ICD-10-CM

## 2025-06-25 DIAGNOSIS — E87.1 HYPONATREMIA: ICD-10-CM

## 2025-06-25 NOTE — PROGRESS NOTES
Subjective   Kelvin Restrepo is a 89 y.o. female. Presents today for   Chief Complaint   Patient presents with    hyponatremia         History of Present Illness  History of Present Illness  The patient is experiencing recurrent episodes of severe fatigue and has been found to have hyponatremia, with serum sodium levels persistently low, ranging from 134 to 126 over time. Although her sodium levels have fluctuated, they have not returned to normal. Serum osmolality has ranged from 273 to 258, and urine sodium levels were 53 and 29 in the last two tests, with urine osmolality at 255 and 227. She has not shown signs of volume overload to suggest hypervolemia, such as CHF or cirrhosis. Renal function is normal, and there is no evidence of cardiac failure. She is not on a diuretic. She did have a GI illness at one point and felt better after IV fluids, but her sodium levels dropped again even after the illness resolved. Adrenal and thyroid functions have been checked and are normal. A review of her medications did not reveal any obvious culprits, though pantoprazole is a potential candidate. After the last labs, a referral to nephrology was recommended.    She reports feeling well overall, with no instances of diarrhea or vomiting. However, she does experience shortness of breath when anxious and occasional difficulty swallowing. She has an upcoming appointment with Dr. Brady, a gastroenterologist, in a few months. She reports no abdominal pain but does experience mild stomach discomfort. She also notes a significant weight loss,  She does not experience coughing during meals but does report a sensation of phlegm in her throat. She reports no leg swelling.  No cp/soa;  no pnd/orthopnea    PAST SURGICAL HISTORY:  She had an esophageal ring (Schatzki's ring) dilated out two years ago.  Review of Systems   Constitutional:  Positive for fatigue and unexpected weight change. Negative for chills and fever.   HENT:  Positive  "for trouble swallowing.    Respiratory:  Positive for cough. Negative for shortness of breath.    Cardiovascular:  Negative for chest pain and palpitations.   Gastrointestinal:  Positive for nausea. Negative for abdominal pain, anal bleeding, blood in stool and vomiting.       Patient Active Problem List   Diagnosis    Suprapubic pain    Allergic state    Chronic constipation    Gastroesophageal reflux disease    Fatigue    Hyperlipidemia    Hypothyroidism    Insomnia    Obstructive sleep apnea syndrome    Osteoarthritis of multiple joints    Osteoporosis    Cobalamin deficiency    Essential hypertension    Anxiety    H/O right mastectomy    H/O hyperlipidemia    History of breast cancer    History of hypothyroidism    Seasonal allergic rhinitis    Glaucoma (increased eye pressure)       Social History     Socioeconomic History    Marital status:    Tobacco Use    Smoking status: Never     Passive exposure: Never    Smokeless tobacco: Never   Vaping Use    Vaping status: Never Used   Substance and Sexual Activity    Alcohol use: No    Drug use: No    Sexual activity: Not Currently     Partners: Male     Birth control/protection: Post-menopausal       Allergies   Allergen Reactions    Amlodipine      Patient does not recall ever having a reaction to this medication     Codeine Other (See Comments)    Prednisone Hives    Atorvastatin Myalgia         Objective   Vitals:    06/25/25 1406   BP: 134/76   Pulse: 65   SpO2: 99%   Height: 152.4 cm (60\")     Body mass index is 26.95 kg/m².    Physical Exam  Vitals and nursing note reviewed.   Constitutional:       Appearance: She is well-developed.   HENT:      Head: Normocephalic and atraumatic.   Neck:      Thyroid: No thyromegaly.      Vascular: No JVD.   Cardiovascular:      Rate and Rhythm: Normal rate and regular rhythm.      Heart sounds: Normal heart sounds. No murmur heard.     No friction rub. No gallop.   Pulmonary:      Effort: Pulmonary effort is normal. " No respiratory distress.      Breath sounds: Normal breath sounds. No wheezing or rales.   Abdominal:      General: Bowel sounds are normal. There is no distension.      Palpations: Abdomen is soft.      Tenderness: There is no abdominal tenderness. There is no guarding or rebound.   Musculoskeletal:      Cervical back: Neck supple.   Skin:     General: Skin is warm and dry.   Neurological:      Mental Status: She is alert.   Psychiatric:         Behavior: Behavior normal.       Physical Exam      Results  Labs   - Serum sodium: 134-128, 127, 126   - Serum osmolality: 273-258   - Urine sodium: 53 and 29   - Urine osmolality: 255 and 227   - Adrenal function: Normal   - Thyroid function: Normal    Imaging   - CT abdomen and pelvis: Normal   - CT of the head: 05/14/2025, Normal       important suggestion  View Detailed Reports      important suggestion  View Condensed Results Report     Salivary Cortisol, MS - Saliva, Oral Cavity  Order: [id number]  Specimen Information: Oral Cavity; Saliva       Component  Ref Range & Units (hover) 13 d ago   Cortisol, Salivary 0.267   Comment: This test was developed and its performance characteristics  determined by [facility]. It has not been cleared or approved  by the Food and Drug Administration.  Reference Range:  Children and Adults:  8:00a.m.:   0.025 - 0.600  Noon:      <0.010 - 0.330  4:00p.m.:   0.010 - 0.200  Bedtime (9:00p.m.-Midnight):  <0.010 - 0.090              Narrative    Performed at:  02 - [facility]  [address] [place] [address]  [id number]  : Nakul Dao MD, Phone:  [phone]   Specimen Collected: 06/12/25 13:15 EDT Last Resulted: 06/22/25 03:13 EDT       Result Care Coordination      Result Notes     Sally Soto MA  6/24/2025 11:30 AM EDT Back to Top        I left message for pt to return call      Leoncio Stoll DO  6/22/2025 10:33 AM EDT         Call results to patient.  Sodium level has dropped again, need CT chest to be  ordered.  I also recommend she see nephrology for the low sodium and this is why probably feeling bad again.         Patient Communication     Add Comments   Not seen Back to Top        Encounters Related to Results    6/22/2025 Results Follow-Up  6/11/2025 Office Visit (Ordering Encounter) Back to Top   View Full Report  ACTH  Order: [id number]  Specimen Information: Blood       Component  Ref Range & Units (hover) 13 d ago   ACTH 13.4   Comment: ACTH reference interval for samples collected between 7 and 10 AM.              Narrative    Performed at:  01 - [address]  [id number]  : Xavier Wolf PhD, Phone:  [phone]   Specimen Collected: 06/12/25 13:15 EDT Last Resulted: 06/22/25 03:13 EDT       Result Care Coordination      Result Notes     Sally Soto MA  6/24/2025 11:30 AM EDT Back to Top        I left message for pt to return call      Leoncio Stoll DO  6/22/2025 10:33 AM EDT         Call results to patient.  Sodium level has dropped again, need CT chest to be ordered.  I also recommend she see nephrology for the low sodium and this is why probably feeling bad again.         Patient Communication     Add Comments   Not seen Back to Top        Encounters Related to Results    6/22/2025 Results Follow-Up  6/11/2025 Office Visit (Ordering Encounter) Back to Top   View Full Report  TSH  Order: [id number]  Specimen Information: Blood       Component  Ref Range & Units (hover) 13 d ago   TSH 2.660              Narrative    Performed at:  [address]  [id number]  : Xavier Wolf PhD, Phone:  [phone]   Specimen Collected: 06/12/25 13:15 EDT Last Resulted: 06/22/25 03:13 EDT       Result Care Coordination      Result Notes     Sally Soto MA  6/24/2025 11:30 AM EDT Back to Top        I left message for pt to return call      Leoncio Stoll DO  6/22/2025 10:33 AM EDT         Call results to  patient.  Sodium level has dropped again, need CT chest to be ordered.  I also recommend she see nephrology for the low sodium and this is why probably feeling bad again.         Patient Communication     Add Comments   Not seen Back to Top        Encounters Related to Results    6/22/2025 Results Follow-Up  6/11/2025 Office Visit (Ordering Encounter) Back to Top   View Full Report   Contains abnormal data Basic Metabolic Panel  Order: [id number]  Specimen Information: Blood       Component  Ref Range & Units (hover) 13 d ago   Glucose 98   BUN 12   Creatinine 0.75   EGFR Result 74   BUN/Creatinine Ratio 16   Sodium 126 Low    Potassium 4.1   Chloride 88 Low    Total CO2 22   Calcium 9.7              Narrative    Performed at:  01 - [facility]  [id number]  : Xavier Wolf PhD, Phone:  [phone]   Specimen Collected: 06/12/25 13:15 EDT Last Resulted: 06/22/25 03:13 EDT       Result Care Coordination      Result Notes     Sally Soto MA  6/24/2025 11:30 AM EDT Back to Top        I left message for pt to return call      Leoncio Stoll DO  6/22/2025 10:33 AM EDT         Call results to patient.  Sodium level has dropped again, need CT chest to be ordered.  I also recommend she see nephrology for the low sodium and this is why probably feeling bad again.         Patient Communication     Add Comments   Not seen Back to Top        Encounters Related to Results    6/22/2025 Results Follow-Up  6/11/2025 Office Visit (Ordering Encounter) Back to Top   View Full Report   Contains abnormal data Osmolality, Serum  Order: [id number]  Specimen Information: Blood       Component  Ref Range & Units (hover) 13 d ago   Osmolality Serum 258 Low               Narrative    Performed at:  03 - [facility]  [id number]  : Feliciano Rucker MD, Phone:  [phone]   Specimen Collected: 06/12/25 13:15 EDT Last Resulted: 06/22/25 03:13 EDT        Result Care Coordination      Result Notes     Sally Soto MA  6/24/2025 11:30 AM EDT Back to Top        I left message for pt to return call      Leoncio Stoll DO  6/22/2025 10:33 AM EDT         Call results to patient.  Sodium level has dropped again, need CT chest to be ordered.  I also recommend she see nephrology for the low sodium and this is why probably feeling bad again.         Patient Communication     Add Comments   Not seen Back to Top        Encounters Related to Results    6/22/2025 Results Follow-Up  6/11/2025 Office Visit (Ordering Encounter) Back to Top   View Full Report  Osmolality, Urine - Urine, Clean Catch  Order: [id number]  Specimen Information: Urine, Clean Catch       Component  Ref Range & Units (hover) 13 d ago   Osmolality, URINE 227   Comment:                                   24 hr :   300 -  900                                    Random:    50 - 1400                                    After 12hr fluid                                    restriction:    >850              Narrative    Performed at:  [address]  [id number]  : Feliciano Rucker MD, Phone:  [phone]   Specimen Collected: 06/12/25 13:15 EDT Last Resulted: 06/22/25 03:13 EDT       Result Care Coordination      Result Notes     Sally Soto MA  6/24/2025 11:30 AM EDT Back to Top        I left message for pt to return call      Leoncio Stoll DO  6/22/2025 10:33 AM EDT         Call results to patient.  Sodium level has dropped again, need CT chest to be ordered.  I also recommend she see nephrology for the low sodium and this is why probably feeling bad again.         Patient Communication     Add Comments   Not seen Back to Top        Encounters Related to Results    6/22/2025 Results Follow-Up  6/11/2025 Office Visit (Ordering Encounter) Back to Top   View Full Report  Creatinine Urine Random (kidney function) GFR component - Urine, Clean Catch  Order:  [id number]  Specimen Information: Urine, Clean Catch       Component  Ref Range & Units (hover) 13 d ago   Creatinine, Urine 31.4              Narrative    Performed at:  01 - [facility]  [id number]  : Xavier Wolf PhD, Phone:  [phone]   Specimen Collected: 06/12/25 13:15 EDT Last Resulted: 06/22/25 03:13 EDT       Result Care Coordination      Result Notes     Sally Soto MA  6/24/2025 11:30 AM EDT Back to Top        I left message for pt to return call      Leoncio Stoll DO  6/22/2025 10:33 AM EDT         Call results to patient.  Sodium level has dropped again, need CT chest to be ordered.  I also recommend she see nephrology for the low sodium and this is why probably feeling bad again.         Patient Communication     Add Comments   Not seen Back to Top        Encounters Related to Results    6/22/2025 Results Follow-Up  6/11/2025 Office Visit (Ordering Encounter) Back to Top   View Full Report  Sodium, Urine, Random - Urine, Clean Catch  Order: [id number]  Specimen Information: Urine, Clean Catch       Component  Ref Range & Units (hover) 13 d ago   Sodium, urine 29   Comment: **Verified by repeat analysis**              Narrative    Performed at:  01 - [facility]  [id number]  : Xavier Wolf PhD, Phone:  [phone]   Specimen Collected: 06/12/25 13:15 EDT Last Resulted: 06/22/25 03:13 EDT       Result Care Coordination      Result Notes     Sally Soto MA  6/24/2025 11:30 AM EDT Back to Top        I left message for pt to return call      Leoncio Stoll DO  6/22/2025 10:33 AM EDT         Call results to patient.  Sodium level has dropped again, need CT chest to be ordered.  I also recommend she see nephrology for the low sodium and this is why probably feeling bad again.         Patient Communication     Add Comments   Not seen Back to Top        Encounters Related to Results    6/22/2025 Results  Follow-Up  6/11/2025 Office Visit (Ordering Encounter) Back to Top   View Full Report   Contains abnormal data Basic Metabolic Panel  Order: [id number]  Specimen Information: Blood       Component  Ref Range & Units (hover) 3 wk ago   Glucose 89   BUN 15   Creatinine 0.76   EGFR Result 73   BUN/Creatinine Ratio 20   Sodium 132 Low    Potassium 4.2   Chloride 93 Low    Total CO2 20   Calcium 9.4              Narrative    Performed at:  01 - [facility]  [id number]  : Xavier Wolf PhD, Phone:  [phone]   Specimen Collected: 05/29/25 10:44 EDT Last Resulted: 06/05/25 03:07 EDT       Result Care Coordination      Result Notes     Danielle Ma MA  6/9/2025  5:30 PM EDT Back to Top        Pt is aware and labs ordered       Leoncio Stoll DO  6/8/2025  6:48 PM EDT         Call results to patient.  Her sodium level has improved.  Repeat BMP, TSH and AM cortisol in 2 weeks dx hyponatremia and fatigue to monitor.       Patient Communication     Add Comments   Not seen Back to Top        Encounters Related to Results    6/8/2025 Results Follow-Up  5/28/2025 Office Visit (Ordering Encounter) Back to Top   View Full Report   Contains abnormal data Osmolality, Serum  Order: [id number]  Specimen Information: Blood       Component  Ref Range & Units (hover) 3 wk ago   Osmolality Serum 273 Low               Narrative    Performed at:  02 - [facility]  [id number]  : Feliciano Rucker MD, Phone:  [phone]   Specimen Collected: 05/29/25 10:44 EDT Last Resulted: 06/05/25 03:07 EDT       Result Care Coordination      Result Notes     Danielle Ma MA  6/9/2025  5:30 PM EDT Back to Top        Pt is aware and labs ordered       Leoncio Stoll DO  6/8/2025  6:48 PM EDT         Call results to patient.  Her sodium level has improved.  Repeat BMP, TSH and AM cortisol in 2 weeks dx hyponatremia and fatigue to monitor.       Patient  Communication     Add Comments   Not seen Back to Top        Encounters Related to Results    6/8/2025 Results Follow-Up  5/28/2025 Office Visit (Ordering Encounter) Back to Top   View Full Report  Osmolality, Urine - Urine, Clean Catch  Order: [id number]  Specimen Information: Urine, Clean Catch       Component  Ref Range & Units (hover) 3 wk ago   Osmolality, URINE 255   Comment:                                   24 hr :   300 -  900                                    Random:    50 - 1400                                    After 12hr fluid                                    restriction:    >850              Narrative    Performed at:  02 - [facility]  [id number]  : Feliciano Rucker MD, Phone:  [phone]   Specimen Collected: 05/29/25 10:44 EDT Last Resulted: 06/05/25 03:07 EDT       Result Care Coordination      Result Notes     Danielle Ma MA  6/9/2025  5:30 PM EDT Back to Top        Pt is aware and labs ordered       Leoncio Stoll DO  6/8/2025  6:48 PM EDT         Call results to patient.  Her sodium level has improved.  Repeat BMP, TSH and AM cortisol in 2 weeks dx hyponatremia and fatigue to monitor.       Patient Communication     Add Comments   Not seen Back to Top        Encounters Related to Results    6/8/2025 Results Follow-Up  5/28/2025 Office Visit (Ordering Encounter) Back to Top   View Full Report  Creatinine Urine Random (kidney function) GFR component - Urine, Clean Catch  Order: [id number]  Specimen Information: Urine, Clean Catch       Component  Ref Range & Units (hover) 3 wk ago   Creatinine, Urine 33.0              Narrative    Performed at:  01 - [facility]  [id number]  : Xavier Wolf PhD, Phone:  [phone]   Specimen Collected: 05/29/25 10:44 EDT Last Resulted: 06/05/25 03:07 EDT       Result Care Coordination      Result Notes     Danielle Ma MA  6/9/2025  5:30 PM EDT Back to Top         Pt is aware and labs ordered       Leoncio R Jerman, DO  6/8/2025  6:48 PM EDT         Call results to patient.  Her sodium level has improved.  Repeat BMP, TSH and AM cortisol in 2 weeks dx hyponatremia and fatigue to monitor.       Patient Communication     Add Comments   Not seen Back to Top        Encounters Related to Results    6/8/2025 Results Follow-Up  5/28/2025 Office Visit (Ordering Encounter) Back to Top   View Full Report  Sodium, Urine, Random - Urine, Clean Catch  Order: [id number]  Specimen Information: Urine, Clean Catch       Component  Ref Range & Units (hover) 3 wk ago   Sodium, urine 53   Comment: **Verified by repeat analysis**              Narrative    Performed at:  [address] [place] [address]  [id number]  : Xavier Wolf PhD, Phone:  [phone]   Specimen Collected: 05/29/25 10:44 EDT Last Resulted: 06/05/25 03:07 EDT       Result Care Coordination      Result Notes     Danielle Ma MA  6/9/2025  5:30 PM EDT Back to Top        Pt is aware and labs ordered       Leoncio R Jerman, DO  6/8/2025  6:48 PM EDT         Call results to patient.  Her sodium level has improved.  Repeat BMP, TSH and AM cortisol in 2 weeks dx hyponatremia and fatigue to monitor.       Patient Communication     Add Comments   Not seen Back to Top        Encounters Related to Results    6/8/2025 Results Follow-Up  5/28/2025 Office Visit (Ordering Encounter) Back to Top   View Full Report  Urinalysis With Microscopic -  Order: [id number]   suggestion  Result Information displayed in this report will not trend and may not trigger automated decision support.     Component  Ref Range & Units 4 wk ago   Color, Urine Colorless   Clarity, Urine Clear   Specific Gravity, Urine  1.005 - 1.030 [arb'U] 1.005   pH, Urine  5.0 - 9.0 [pH] 7.0   Protein, Urine  Negative, 10 mg/dL, 15 mg/dL, 20 mg/dL, Trace Negative   Glucose, Urine  Negative Negative   Ketones, Urine  Negative Negative    Bilirubin, Urine  Negative mg/dL Negative   Blood, Urine  Negative [arb'U] Negative   Nitrite, Urine  Negative Negative   Urobilinogen, Urine  Normal Normal   Leukocyte Esterase, Urine  Negative Negative   Reflex Microscopic?    Comment: Macroscopic only performed     Specimen Collected: 05/27/25 23:27    Performed by: [facility]  ([id number]) Last Resulted: 05/27/25 23:54   Received From: [facility]  Result Received: 05/28/25 12:24    View Encounter        Received Information  CBC AND DIFFERENTIAL  Order: [id number]   suggestion  Result Information displayed in this report will not trend and may not trigger automated decision support.     Component  Ref Range & Units 4 wk ago   White Blood Cells  4.50 - 11.00 10*3/uL 6.36   Red Blood Cells  4.00 - 5.20 10*6/uL 4.74   Hemoglobin  12.0 - 16.0 g/dL 13.6   Hematocrit  36.0 - 46.0 % 41   Mean Corpuscular Volume  80.0 - 100.0 fL 86.5   Mean Corpuscular Hemoglobin  26.0 - 34.0 pg 28.7   Mean Corpuscular Hemoglobin Concentration  31.0 - 37.0 g/dL 33.2   % Red Blood Cell Distribution Width  12.0 - 16.8 % 13.6   Platelet Count  140 - 440 10*3/uL 211   Mean Platelet Volume  8.4 - 12.4 fL 9   % Neutrophils  45.0 - 80.0 % 74.8   % Lymphocytes  15.0 - 50.0 % 15.3   % Monocytes  0.0 - 15.0 % 9.4   % Eosinophils  0.0 - 7.0 % 0   % Basophils  0.0 - 2.0 % 0.2   % Immature Granulocytes  0.0 - 1.0 % 0.3   % Nucleated RBCs  <=0 /100 WBCs 0   Absolute Neutrophil Count  2.00 - 8.80 10*3/uL 4.76   Absolute Lymphocytes  0.70 - 5.50 10*3/uL 0.97   Absolute Monocytes  0.00 - 1.70 10*3/uL 0.6   Absolute Eosinophils  0.00 - 0.80 10*3/uL 0   Absolute Basophils  0.00 - 0.20 10*3/uL 0.01   Absolute Immature Granulocytes  0.00 - 0.10 10*3/uL 0.02     Specimen Collected: 05/27/25 22:00    Performed by: [facility]  ([id number]) Last Resulted: 05/27/25 22:13   Received From: [facility]  Result Received: 05/28/25 12:24    View Encounter        Received Information   Contains abnormal  data COMPREHENSIVE METABOLIC PANEL  Order: [id number]   suggestion  Result Information displayed in this report will not trend and may not trigger automated decision support.     Component  Ref Range & Units 4 wk ago   Sodium  136 - 145 mmol/L 128 Low    Comment: Excess protein and/or lipids can falsely decrease sodium levels (pseudo hyponatremia).   Potassium  3.5 - 5.1 mmol/L 3.7   Comment: Falsely elevated potassium can occur in patients with high WBC or platelet counts.   Chloride  98 - 111 mmol/L 94 Low    Comment: Falsely elevated chloride levels can be seen in patients taking bromide containing medications.   Carbon Dioxide  22 - 29 mmol/L 25   Anion Gap  5 - 13 mmol/L 9   Comment: Calculation: Na - (Cl + CO2)   Glucose, Random  71 - 99 mg/dL 86   Blood Urea Nitrogen (BUN)  10 - 20 mg/dL 14   Creatinine, Blood  0.55 - 1.02 mg/dL 0.73   BUN/Creatinine Ratio  RATIO 19.2   Estimated GFR (Cr)  >60 mL/min/1.73m2 76   Comment: eGFR calculated based on IDMS traceable, enzymatic creatinine method using the CKD-EPI 2021 equation.   Total Protein  6.2 - 8.0 g/dL 7.1   Albumin  2.9 - 4.5 g/dL 3.9   Globulin  1.5 - 4.5 g/dL 3.2   Albumin/Globulin Ratio  1.1 - 2.5 RATIO 1.2   Calcium  8.4 - 10.2 mg/dL 8.9   Total Bilirubin  0.2 - 1.2 mg/dL 0.8   AST/SGOT  5 - 34 U/L 27   ALT/SGPT  0 - 55 U/L 21   Alkaline Phosphatase  40 - 150 U/L 62     Specimen Collected: 05/27/25 22:00    Performed by: [facility]  ([id number]) Last Resulted: 05/27/25 22:35   Received From: [facility]  Result Received: 05/28/25 12:24    View Encounter        Received Information  LIPASE  Order: [id number]   suggestion  Result Information displayed in this report will not trend and may not trigger automated decision support.     Component  Ref Range & Units 4 wk ago   Lipase  0 - 59 U/L 42     Specimen Collected: 05/27/25 22:00    Performed by: [facility]  ([id number]) Last Resulted: 05/27/25 22:35   Received From: [facility]  Result  Received: 05/28/25 12:24    View Encounter        Received Information   Contains abnormal data Basic Metabolic Panel  Order: [id number]  Specimen Information: Blood       Component  Ref Range & Units (hover) 1 mo ago   Glucose 65 Low    BUN 15   Creatinine 0.78   EGFR Result 70   BUN/Creatinine Ratio 19   Sodium 127 Low    Potassium 4.6   Chloride 88 Low    Total CO2 21   Calcium 9.6              Narrative    Performed at:  [address] - [address]  [id number]  : Xavier Wolf PhD, Phone:  [phone]  Patient Fasting:  N   Specimen Collected: 05/20/25 14:15 EDT Last Resulted: 05/21/25 05:08 EDT       Result Care Coordination      Result Notes     Danielle Ma MA  5/21/2025 11:14 AM EDT Back to Top        I left detailed msg informing pt.       Leoncio Stoll DO  5/21/2025  8:18 AM EDT         Call results to patient.  Sodium level still down.  Have return next Monday for full labs and urine to evaluate cause.  It could be medication induced, but will need further labs.          Patient Communication     Add Comments   Not seen Back to Top        Encounters Related to Results    5/19/2025 Results Follow-Up (Ordering Encounter) Back to Top   View Full Report   Contains abnormal data CBC AND DIFFERENTIAL  Order: [id number]   suggestion  Result Information displayed in this report will not trend and may not trigger automated decision support.     Component  Ref Range & Units 1 mo ago   White Blood Cells  4.50 - 11.00 10*3/uL 7.02   Red Blood Cells  4.00 - 5.20 10*6/uL 4.55   Hemoglobin  12.0 - 16.0 g/dL 12.9   Hematocrit  36.0 - 46.0 % 39.5   Mean Corpuscular Volume  80.0 - 100.0 fL 86.8   Mean Corpuscular Hemoglobin  26.0 - 34.0 pg 28.4   Mean Corpuscular Hemoglobin Concentration  31.0 - 37.0 g/dL 32.7   % Red Blood Cell Distribution Width  12.0 - 16.8 % 13.7   Platelet Count  140 - 440 10*3/uL 212   Mean Platelet Volume  8.4 - 12.4 fL 9.5   % Neutrophils  45.0 - 80.0 % 75   %  Lymphocytes  15.0 - 50.0 % 11.8 Low    % Monocytes  0.0 - 15.0 % 12.3   % Eosinophils  0.0 - 7.0 % 0.1   % Basophils  0.0 - 2.0 % 0.1   % Immature Granulocytes  0.0 - 1.0 % 0.7   % Nucleated RBCs  <=0 /100 WBCs 0   Absolute Neutrophil Count  2.00 - 8.80 10*3/uL 5.26   Absolute Lymphocytes  0.70 - 5.50 10*3/uL 0.83   Absolute Monocytes  0.00 - 1.70 10*3/uL 0.86   Absolute Eosinophils  0.00 - 0.80 10*3/uL 0.01   Absolute Basophils  0.00 - 0.20 10*3/uL 0.01   Absolute Immature Granulocytes  0.00 - 0.10 10*3/uL 0.05     Specimen Collected: 05/17/25 11:30    Performed by: [facility]  ([id number]) Last Resulted: 05/17/25 11:57   Received From: [facility]  Result Received: 05/28/25 12:24    View Encounter        Received Information   Contains abnormal data COMPREHENSIVE METABOLIC PANEL  Order: [id number]   suggestion  Result Information displayed in this report will not trend and may not trigger automated decision support.     Component  Ref Range & Units 1 mo ago   Sodium  136 - 145 mmol/L 128 Low    Comment: Excess protein and/or lipids can falsely decrease sodium levels (pseudo hyponatremia).   Potassium  3.5 - 5.1 mmol/L 4   Comment: Falsely elevated potassium can occur in patients with high WBC or platelet counts.   Chloride  98 - 111 mmol/L 94 Low    Comment: Falsely elevated chloride levels can be seen in patients taking bromide containing medications.   Carbon Dioxide  22 - 29 mmol/L 26   Anion Gap  5 - 13 mmol/L 8   Comment: Calculation: Na - (Cl + CO2)   Glucose, Random  71 - 99 mg/dL 108 High    Blood Urea Nitrogen (BUN)  10 - 20 mg/dL 9 Low    Creatinine, Blood  0.55 - 1.02 mg/dL 0.69   BUN/Creatinine Ratio  RATIO 13   Estimated GFR (Cr)  >60 mL/min/1.73m2 80   Comment: eGFR calculated based on IDMS traceable, enzymatic creatinine method using the CKD-EPI 2021 equation.   Total Protein  6.2 - 8.0 g/dL 6.6   Albumin  2.9 - 4.5 g/dL 3.7   Globulin  1.5 - 4.5 g/dL 2.9   Albumin/Globulin Ratio  1.1 - 2.5  RATIO 1.3   Calcium  8.4 - 10.2 mg/dL 8.6   Total Bilirubin  0.2 - 1.2 mg/dL 0.8   AST/SGOT  5 - 34 U/L 25   ALT/SGPT  0 - 55 U/L 23   Alkaline Phosphatase  40 - 150 U/L 57     Specimen Collected: 05/17/25 11:30    Performed by: [facility]  ([id number]) Last Resulted: 05/17/25 12:05   Received From: [facility]  Result Received: 05/28/25 12:24    View Encounter        Received Information  TSH  Order: [id number]   suggestion  Result Information displayed in this report will not trend and may not trigger automated decision support.     Component  Ref Range & Units 1 mo ago   Thyroid Stimulating Hormone  0.35 - 4.94 m[U]/L 2.29   Narrative    Biotin does not interfere with this testing methodology.    Specimen Collected: 05/17/25 11:30    Performed by: [facility]  ([id number]) Last Resulted: 05/17/25 12:25   Received From: [facility]  Result Received: 05/28/25 12:24    View Encounter        Received Information  Urinalysis With Microscopic -  Order: [id number]   suggestion  Result Information displayed in this report will not trend and may not trigger automated decision support.     Component  Ref Range & Units 1 mo ago   Color, Urine Light-Yellow   Clarity, Urine Clear   Specific Gravity, Urine  1.005 - 1.030 [arb'U] 1.006   pH, Urine  5.0 - 9.0 [pH] 7.0   Protein, Urine  Negative, 10 mg/dL, 15 mg/dL, 20 mg/dL, Trace Negative   Glucose, Urine  Negative Negative   Ketones, Urine  Negative Negative   Bilirubin, Urine  Negative mg/dL Negative   Blood, Urine  Negative [arb'U] Negative   Nitrite, Urine  Negative Negative   Urobilinogen, Urine  Normal Normal   Leukocyte Esterase, Urine  Negative Negative   Reflex Microscopic?    Comment: Macroscopic only performed     Specimen Collected: 05/17/25 11:30    Performed by: [facility]  ([id number]) Last Resulted: 05/17/25 12:37   Received From: [facility]  Result Received: 05/28/25 12:24    View Encounter        Received Information   Contains abnormal data  Basic Metabolic Panel  Order: [id number]  Specimen Information: Blood       Component  Ref Range & Units (hover) 1 mo ago   Glucose 92   BUN 9   Creatinine 0.82   EGFR Result 66.4   Comment: GFR Categories in Chronic Kidney Disease (CKD)  GFR Category          GFR (mL/min/1.73)    Interpretation  G1                    90 or greater        Normal or high  (1)  G2                    60-89                Mild decrease  (1)  G3a                   45-59                Mild to moderate  decrease  G3b                   30-44                Moderate to  severe decrease  G4                    15-29                Severe decrease  G5                    14 or less           Kidney failure  (1)In the absence of evidence of kidney disease, neither  GFR category G1 or G2 fulfill the criteria for CKD.  eGFR calculation 2021 CKD-EPI creatinine equation, which  does not include race as a factor   BUN/Creatinine Ratio 11.0   Sodium 128 Low    Potassium 4.3   Chloride 95 Low    Total CO2 24.4   Calcium 9.0              Narrative    Performed at:  [address]  [id number]  : Leoncio Sierra MD, Phone:  [phone]   Specimen Collected: 05/14/25 11:00 EDT Last Resulted: 05/15/25 03:07 EDT       Result Care Coordination      Result Notes     Danielle Ma MA  5/19/2025  1:50 PM EDT Back to Top        Pt is aware and lab ordered       Leoncio Stoll DO  5/19/2025  1:10 PM EDT         Call results to patient.  Sodium level improved from 125 to 128, have recheck BMP this week dx hyponatremia.         Patient Communication     Add Comments   Not seen Back to Top        Encounters Related to Results    5/19/2025 Results Follow-Up  5/14/2025 Office Visit (Ordering Encounter) Back to Top   View Full Report  Urinalysis With Microscopic -  Order: [id number]   suggestion  Result Information displayed in this report will not trend and may not trigger automated decision support.      Component  Ref Range & Units 1 mo ago   Color, Urine Colorless   Clarity, Urine Clear   Specific Gravity, Urine  1.005 - 1.030 [arb'U] 1.005   pH, Urine  5.0 - 9.0 [pH] 7.0   Protein, Urine  Negative, 10 mg/dL, 15 mg/dL, 20 mg/dL, Trace Negative   Glucose, Urine  Negative Negative   Ketones, Urine  Negative Negative   Bilirubin, Urine  Negative mg/dL Negative   Blood, Urine  Negative [arb'U] Negative   Nitrite, Urine  Negative Negative   Urobilinogen, Urine  Normal Normal   Leukocyte Esterase, Urine  Negative Negative   Reflex Microscopic?    Comment: Macroscopic only performed     Specimen Collected: 05/14/25 02:13    Performed by: [facility]  ([id number]) Last Resulted: 05/14/25 02:22   Received From: [facility]  Result Received: 05/14/25 10:09    View Encounter        Received Information  CBC AND DIFFERENTIAL  Order: [id number]   suggestion  Result Information displayed in this report will not trend and may not trigger automated decision support.     Component  Ref Range & Units 1 mo ago   White Blood Cells  4.50 - 11.00 10*3/uL 6.89   Red Blood Cells  4.00 - 5.20 10*6/uL 4.93   Hemoglobin  12.0 - 16.0 g/dL 14.3   Hematocrit  36.0 - 46.0 % 41.7   Mean Corpuscular Volume  80.0 - 100.0 fL 84.6   Mean Corpuscular Hemoglobin  26.0 - 34.0 pg 29   Mean Corpuscular Hemoglobin Concentration  31.0 - 37.0 g/dL 34.3   % Red Blood Cell Distribution Width  12.0 - 16.8 % 13.6   Platelet Count  140 - 440 10*3/uL 228   Mean Platelet Volume  8.4 - 12.4 fL 9.4   % Neutrophils  45.0 - 80.0 % 73.2   % Lymphocytes  15.0 - 50.0 % 15.2   % Monocytes  0.0 - 15.0 % 10.6   % Eosinophils  0.0 - 7.0 % 0   % Basophils  0.0 - 2.0 % 0.1   % Immature Granulocytes  0.0 - 1.0 % 0.9   % Nucleated RBCs  <=0 /100 WBCs 0   Absolute Neutrophil Count  2.00 - 8.80 10*3/uL 5.04   Absolute Lymphocytes  0.70 - 5.50 10*3/uL 1.05   Absolute Monocytes  0.00 - 1.70 10*3/uL 0.73   Absolute Eosinophils  0.00 - 0.80 10*3/uL 0   Absolute  Basophils  0.00 - 0.20 10*3/uL 0.01   Absolute Immature Granulocytes  0.00 - 0.10 10*3/uL 0.06     Specimen Collected: 05/14/25 00:49    Performed by: [facility]  ([id number]) Last Resulted: 05/14/25 01:04   Received From: [facility]  Result Received: 05/14/25 10:09    View Encounter        Received Information   Contains abnormal data COMPREHENSIVE METABOLIC PANEL  Order: [id number]   suggestion  Result Information displayed in this report will not trend and may not trigger automated decision support.     Component  Ref Range & Units 1 mo ago   Sodium  136 - 145 mmol/L 125 Low    Comment: Excess protein and/or lipids can falsely decrease sodium levels (pseudo hyponatremia).   Potassium  3.5 - 5.1 mmol/L 4.3   Comment: Falsely elevated potassium can occur in patients with high WBC or platelet counts.   Chloride  98 - 111 mmol/L 91 Low    Comment: Falsely elevated chloride levels can be seen in patients taking bromide containing medications.   Carbon Dioxide  22 - 29 mmol/L 25   Anion Gap  5 - 13 mmol/L 9   Comment: Calculation: Na - (Cl + CO2)   Glucose, Random  71 - 99 mg/dL 96   Blood Urea Nitrogen (BUN)  10 - 20 mg/dL 10   Creatinine, Blood  0.55 - 1.02 mg/dL 0.77   BUN/Creatinine Ratio  RATIO 13   Estimated GFR (Cr)  >60 mL/min/1.73m2 71   Comment: eGFR calculated based on IDMS traceable, enzymatic creatinine method using the CKD-EPI 2021 equation.   Total Protein  6.2 - 8.0 g/dL 7.4   Albumin  2.9 - 4.5 g/dL 4.1   Globulin  1.5 - 4.5 g/dL 3.3   Albumin/Globulin Ratio  1.1 - 2.5 RATIO 1.2   Calcium  8.4 - 10.2 mg/dL 9.4   Total Bilirubin  0.2 - 1.2 mg/dL 0.9   AST/SGOT  5 - 34 U/L 29   ALT/SGPT  0 - 55 U/L 31   Alkaline Phosphatase  40 - 150 U/L 65     Specimen Collected: 05/14/25 00:49    Performed by: [facility]  ([id number]) Last Resulted: 05/14/25 01:19   Received From: [facility]  Result Received: 05/14/25 10:09    View Encounter        Received Information  LIPASE  Order: [id number]    suggestion  Result Information displayed in this report will not trend and may not trigger automated decision support.     Component  Ref Range & Units 1 mo ago   Lipase  0 - 59 U/L 37     Specimen Collected: 05/14/25 00:49    Performed by: [facility]  ([id number]) Last Resulted: 05/14/25 01:19   Received From: [facility]  Result Received: 05/14/25 10:09    View Encounter        Received Information   Contains abnormal data URINALYSIS COMP W CULTURE IF INDICATED  Order: [id number]   suggestion  Information displayed in this report may not trend or trigger automated decision support.     Component  Ref Range & Units 1 mo ago   Specimen Type: U CleanCatch   Color, UA LIGHT YELLOW   Appearance, UA CLEAR   Specific Gravity, UA  1.001 - 1.030 1.01   pH, UA 7.0 Abnormal    URINE LEUKOCYTE ESTERASE  Negative ZZ NEGATIVE   Nitrite, UA  Negative NEGATIVE   URINE PROTEIN DIPSTICK  Negative mg/dL NEGATIVE   Glucose, UA  Negative mg/dL NEGATIVE   URINE KETONES DIPSTICK  Negative mg/dL NEGATIVE   URINE UROBILINOGEN DIPSTICK  Normal mg/dL NORMAL   Bilirubin, UA  Negative mg/dL NEGATIVE   URINE BLOOD DIPSTICK  Negative mg/dL NEGATIVE   Urinalysis Gross Exam  Correlated Correlated   RBC, UA  None /HPF 0-4   WBC, UA  None /HPF 0-4   UR BACTERIA Negative   Squamous Epithelial Cells, UA  None /HPF 0-4   Narrative    Ready for CT    Specimen Collected: 05/06/25 09:04    Performed by: [facility] LAB Last Resulted: 05/06/25 09:55   Received From: [facility]  Result Received: 05/09/25 14:19    Received Information   Contains abnormal data CONV CBC/D/PLT+PATHOL  Order: [id number]   suggestion  Result Information displayed in this report will not trend and may not trigger automated decision support.     Component  Ref Range & Units 1 mo ago   WBC  4.0 - 10.8 x10(3)/ul 11.3 High    RBC  3.77 - 5.16 x10(6)/ul 4.87   Hemoglobin  12.0 - 16.0 Gram/dL 13.9   Hematocrit  35.0 - 45.0 % 41.1   MCV  79.4 - 94.8 fL 84.4   MCH  25.6  - 32.2 pg 28.6   MCHC  32.3 - 36.5 Gram/dL 33.9   RDW  11.0 - 15.5 % 14.5   Platelets  140 - 420 x10(3)/ul 260   MPV  8.7 - 12.0 fL 8.1 Low    Scan Slide NONE   Narrative    ems    Specimen Collected: 05/06/25 08:38    Performed by: [facility] LAB Last Resulted: 05/06/25 09:01   [facility]  Result Received: 05/09/25 14:19    Received Information   Contains abnormal data AUTODIFF  Order: [id number]   suggestion  Information displayed in this report may not trend or trigger automated decision support.     Component  Ref Range & Units 1 mo ago   Neutrophil %  34.0 - 75.0 % 82.5 High    Lymphocyte %  17.0 - 53.0 % 7.4 Low    Monocyte %  2.0 - 12.0 % 9.8   Eosinophil %  0.0 - 7.0 % 0.1   Basophil %  0.0 - 3.0 % 0.2   Neutrophils Absolute  1.5 - 7.1 x10(3)/ul 9.4 High    Lymphocytes Absolute  1.0 - 3.5 x10(3)/ul 0.8 Low    Monocytes Absolute  0.0 - 1.0 x10(3)/ul 1.1 High    Eosinophils Absolute  0.0 - 0.7 x10(3)/ul 0   Basophils Absolute  0.0 - 0.3 x10(3)/ul 0   Narrative    ems   Ordered by Discern Expert.    Specimen Collected: 05/06/25 08:38    Performed by: [facility] LAB Last Resulted: 05/06/25 09:01   Received From: [facility]  Result Received: 05/09/25 14:19    Received Information   Contains abnormal data COMPREHENSIVE METABOLIC PANEL  Order: [id number]   suggestion  Result Information displayed in this report will not trend and may not trigger automated decision support.     Component  Ref Range & Units 1 mo ago   Sodium  136 - 145 mmol/L 129 Low    Potassium  3.5 - 5.1 mmol/L 3.8   Chloride  98 - 110 mmol/L 97 Low    CO2  21 - 31 mmol/L 25   Anion Gap  2.0 - 11.0 7   Calcium  8.6 - 10.2 mg/dL 9.2   Glucose  74 - 109 mg/dL 125 High    BUN  7 - 25 mg/dL 16   Creatinine  0.60 - 1.20 mg/dL 0.66   BUN/Creatinine Ratio  6.0 - 22.0 24.2 High    Albumin  3.5 - 5.2 Gram/dL 4   Total Protein  6.4 - 8.9 Gram/dL 6.7   A/G Ratio  1.0 - 1.7 1.5   Alkaline Phosphatase  34 - 104  Units/Liter 61   ALT (SGPT)  <=24 Units/Liter 13   AST  13 - 39 Units/Liter 17   Total Bilirubin  0.3 - 1.0 mg/dL 0.8   Globulin, Total  Gram/dL 3   EGFR  >=60 mL/min/1.73m2 81   Comment: eGFR calculation performed using the CKD-EPI 2021 equation (race variable excluded)   Narrative    ems    Specimen Collected: 05/06/25 08:38    Performed by: [facility] LAB Last Resulted: 05/06/25 09:18   Received From: [facility]  Result Received: 05/09/25 14:19    Received Information  LIPASE  Order: [id number]  Specimen Information: Blood       Component  Ref Range & Units (hover) 1 mo ago   Lipase 23              Narrative    ems   Specimen Collected: 05/06/25 08:38 EDT    Performed By: [facility] LAB Last Resulted: 05/06/25 09:18 EDT   Received From: [facility]  Result Received: 05/09/25 14:19       Received Information       View Full Report  LACTIC ACID, PLASMA  Order: [id number]   suggestion  Result Information displayed in this report will not trend and may not trigger automated decision support.     Component  Ref Range & Units 1 mo ago   Lactate  0.5 - 1.9 mMole/Liter 1.2   Narrative    ems    Specimen Collected: 05/06/25 08:38    Performed by: [facility] LAB Last Resulted: 05/06/25 09:18   Received From: [facility]  Result Received: 05/09/25 14:19    Received Information   Contains abnormal data URINALYSIS COMP W CULTURE IF INDICATED  Order: [id number]   suggestion  Information displayed in this report may not trend or trigger automated decision support.     Component  Ref Range & Units 2 mo ago   Specimen Type: U CleanCatch   Color, UA LIGHT YELLOW   Appearance, UA CLEAR   Specific Gravity, UA  1.001 - 1.030 1.007   pH, UA 6.5 Abnormal    URINE LEUKOCYTE ESTERASE  Negative ZZ 75 Abnormal    Nitrite, UA  Negative NEGATIVE   URINE PROTEIN DIPSTICK  Negative mg/dL NEGATIVE   Glucose, UA  Negative mg/dL NEGATIVE   URINE KETONES DIPSTICK  Negative mg/dL NEGATIVE   URINE UROBILINOGEN  DIPSTICK  Normal mg/dL NORMAL   Bilirubin, UA  Negative mg/dL NEGATIVE   URINE BLOOD DIPSTICK  Negative mg/dL NEGATIVE   Urinalysis Gross Exam  Correlated Correlated   RBC, UA  None /HPF 0-4   WBC, UA  None /HPF 0-4   UR BACTERIA Negative   Squamous Epithelial Cells, UA  None /HPF 0-4     Specimen Collected: 03/30/25 12:55    Performed by: [facility] LAB Last Resulted: 03/30/25 13:05   [facility]  Result Received: 05/02/25 09:29    Received Information   Contains abnormal data CONV CBC/D/PLT+PATHOL  Order: [id number]   suggestion  Result Information displayed in this report will not trend and may not trigger automated decision support.     Component  Ref Range & Units 2 mo ago   WBC  4.0 - 10.8 x10(3)/ul 6.3   RBC  3.77 - 5.16 x10(6)/ul 4.8   Hemoglobin  12.0 - 16.0 Gram/dL 13.7   Hematocrit  35.0 - 45.0 % 40.1   MCV  79.4 - 94.8 fL 83.6   MCH  25.6 - 32.2 pg 28.6   MCHC  32.3 - 36.5 Gram/dL 34.2   RDW  11.0 - 15.5 % 14.1   Platelets  140 - 420 x10(3)/ul 235   MPV  8.7 - 12.0 fL 7.5 Low    Scan Slide NONE     Specimen Collected: 03/30/25 12:40    Performed by: [facility] LAB Last Resulted: 03/30/25 12:56   [facility]  Result Received: 05/02/25 09:29    Received Information   Contains abnormal data AUTODIFF  Order: [id number]   suggestion  Information displayed in this report may not trend or trigger automated decision support.     Component  Ref Range & Units 2 mo ago   Neutrophil %  34.0 - 75.0 % 72.4   Lymphocyte %  17.0 - 53.0 % 16.1 Low    Monocyte %  2.0 - 12.0 % 11.3   Eosinophil %  0.0 - 7.0 % 0.1   Basophil %  0.0 - 3.0 % 0.1   Neutrophils Absolute  1.5 - 7.1 x10(3)/ul 4.5   Lymphocytes Absolute  1.0 - 3.5 x10(3)/ul 1   Monocytes Absolute  0.0 - 1.0 x10(3)/ul 0.7   Eosinophils Absolute  0.0 - 0.7 x10(3)/ul 0   Basophils Absolute  0.0 - 0.3 x10(3)/ul 0   Narrative     Ordered by Discern Expert.    Specimen Collected: 03/30/25 12:40    Performed by:  [facility] Last Size Last Resulted: 03/30/25 12:56   Received From: [facility]  Result Received: 05/02/25 09:29    Received Information   Contains abnormal data PT AND PTT  Order: [id number]  Specimen Information: Blood       Component  Ref Range & Units (hover) 2 mo ago   Protime 11.4   INR 1   Comment: INTERNATIONAL NORMALIZED RATIO  CONDITION     RECOMMENDED INR  -------------------------------------------------------------------------------------------------------------------------------  Prophylaxis/treatment of Venous Thrombosis, Pulmonary Embolism      2.0 - 3.0    Prevention of systemic embolism from:  Atrial Fibrillation     2.0 - 3.0  Myocardial Infarction     2.0 - 3.0  Mechanical Prosthetic Heart Valves    2.5 - 3.5  Recurrent Systemic Embolism    2.5 - 3.5   PTT 36.6 High    Comment: The PTT Unfractionated heparin therapeutic range for the HEPARIN protocol is 55.0 to 95.0 seconds and is equivalent to 0.3 - 0.7 units/ml anti-Factor Xa activity. The PTT is not recommended for use in therapeutic monitoring of patients on Low Molecular  Weight Heparins.             Specimen Collected: 03/30/25 12:40 EDT    Performed By: [email or web address] Last Resulted: 03/30/25 13:04 EDT   Received From: [facility]  Result Received: 05/02/25 09:29       Received Information     View Full Report  TROPONIN I, HIGH SENSITIVE  Order: [id number]   suggestion  Information displayed in this report may not trend or trigger automated decision support.     Component  Ref Range & Units 2 mo ago   TROPONIN I, HIGH SENSITIVE  <=14 ng/Liter 5     Specimen Collected: 03/30/25 12:40    Performed by: [facility] Last Size Last Resulted: 03/30/25 13:17   [facility]  Result Received: 05/02/25 09:29    Received Information   Contains abnormal data COMPREHENSIVE METABOLIC PANEL  Order: [id number]   suggestion  Result Information displayed in this report will not trend and may not trigger automated decision  support.     Component  Ref Range & Units 2 mo ago   Sodium  136 - 145 mmol/L 131 Low    Potassium  3.5 - 5.1 mmol/L 4.2   Chloride  98 - 110 mmol/L 96 Low    CO2  21 - 31 mmol/L 28   Anion Gap  2.0 - 11.0 7   Calcium  8.6 - 10.2 mg/dL 9.2   Glucose  74 - 109 mg/dL 92   BUN  7 - 25 mg/dL 11   Creatinine  0.60 - 1.20 mg/dL 0.67   BUN/Creatinine Ratio  6.0 - 22.0 16.4   Albumin  3.5 - 5.2 Gram/dL 4.1   Total Protein  6.4 - 8.9 Gram/dL 7.1   A/G Ratio  1.0 - 1.7 1.4   Alkaline Phosphatase  34 - 104 Units/Liter 60   ALT (SGPT)  <=24 Units/Liter 14   AST  13 - 39 Units/Liter 17   Total Bilirubin  0.3 - 1.0 mg/dL 0.6   Globulin, Total  Gram/dL 3   EGFR  >=60 mL/min/1.73m2 81   Comment: eGFR calculation performed using the CKD-EPI 2021 equation (race variable excluded)     Specimen Collected: 03/30/25 12:40    Performed by: [facility] Last Resulted: 03/30/25 13:20   Received From: [facility]  Result Received: 05/02/25 09:29    Received Information  LIPASE  Order: [id number]  Specimen Information: Blood       Component  Ref Range & Units (hover) 2 mo ago   Lipase 41             Specimen Collected: 03/30/25 12:40 EDT    Performed By: [facility] Last Resulted: 03/30/25 13:20 EDT   Received From: [facility]  Result Received: 05/02/25 09:29       Received Information     View Full Report  TROPONIN I, HIGH SENSITIVE  Order: [id number]   suggestion  Information displayed in this report may not trend or trigger automated decision support.     Component  Ref Range & Units 4 mo ago   TROPONIN I, HIGH SENSITIVE  <=14 ng/Liter 11   Narrative    imaging results  on the way to CT    Specimen Collected: 02/17/25 19:45    Performed by: [facility] LAB Last Resulted: 02/17/25 20:21   Received From: [facility]  Result Received: 03/26/25 15:10    Received Information   Contains abnormal data URINALYSIS COMP W CULTURE IF INDICATED  Order: [id number]   suggestion  Information displayed in this  report may not trend or trigger automated decision support.     Component  Ref Range & Units 4 mo ago   Specimen Type: U CleanCatch   Color, UA COLORLESS   Appearance, UA CLEAR   Specific Gravity, UA  1.001 - 1.030 1.006   pH, UA 6.5 Abnormal    URINE LEUKOCYTE ESTERASE  Negative ZZ NEGATIVE   Nitrite, UA  Negative NEGATIVE   URINE PROTEIN DIPSTICK  Negative mg/dL NEGATIVE   Glucose, UA  Negative mg/dL NEGATIVE   URINE KETONES DIPSTICK  Negative mg/dL NEGATIVE   URINE UROBILINOGEN DIPSTICK  Normal mg/dL NORMAL   Bilirubin, UA  Negative mg/dL NEGATIVE   URINE BLOOD DIPSTICK  Negative mg/dL NEGATIVE   Urinalysis Gross Exam  Correlated Correlated   RBC, UA  None /HPF 0-4   WBC, UA  None /HPF 0-4   UR BACTERIA Negative   Squamous Epithelial Cells, UA  None /HPF 0-4   Narrative    Labs/UA/Imaging  can she drink?    Specimen Collected: 02/17/25 17:37    Performed by: [facility] LAB Last Resulted: 02/17/25 18:13   Received From: [facility]  Result Received: 03/26/25 15:10    Received Information  COVID-19, FLU A/B, RSV PCR 1 HR TAT - Other,  Order: [id number]  Specimen Information: Other       Component  Ref Range & Units (hover)    SARS-CoV-2, RAMONA NEGATIVE   Comment: The 2019-CoV rRT-PCR Assay is only for use under a Food and Drug Administration Emergency Use Authorization. The performance characteristics of the assay were verified by the Clinical Laboratory at [facility]. Results should be used in  conjunction with the patient's clinical symptoms, medical history, and other clinical/laboratory findings to determine an overall clinical diagnosis. Negative results do not preclude infection with SARS-CoV-2 (COVID-19).    Test parameters have not been validated for screening asymptomatic patients.   Influenza A PCR NEGATIVE   Influenza B PCR NEGATIVE   RSV, PCR NEGATIVE   Test Comment: No   COMMENTS No   Symptom Yes   DATE unknown   COMMENTS No   COMMENTS No   COMMENTS No   Pregnant? No               Narrative    Labs/UA/Imaging  move to next blue bed   Specimen Collected: 02/17/25 17:24 EST    Performed By: [facility] LAB Last Resulted: 02/17/25 18:19 EST   Received From: [facility]  Result Received: 03/26/25 15:10       Received Information       View Full Report  CONV CBC/D/PLT+PATHOL  Order: [id number]   suggestion  Result Information displayed in this report will not trend and may not trigger automated decision support.     Component  Ref Range & Units 4 mo ago   WBC  4.0 - 10.8 x10(3)/ul 6.8   RBC  3.77 - 5.16 x10(6)/ul 5.02   Hemoglobin  12.0 - 16.0 Gram/dL 14.3   Hematocrit  35.0 - 45.0 % 42   MCV  79.4 - 94.8 fL 83.7   MCH  25.6 - 32.2 pg 28.4   MCHC  32.3 - 36.5 Gram/dL 33.9   RDW  11.0 - 15.5 % 14   Platelets  140 - 420 x10(3)/ul 198   MPV  8.7 - 12.0 fL 8.8   Scan Slide NONE   Narrative    Labs/UA/Imaging    Specimen Collected: 02/17/25 17:20    Performed by: [facility] Last Resulted: 02/17/25 17:45   Received From: [facility]  Result Received: 03/26/25 15:10    Received Information  AUTODIFF  Order: [id number]   suggestion  Information displayed in this report may not trend or trigger automated decision support.     Component  Ref Range & Units 4 mo ago   Neutrophil %  34.0 - 75.0 % 72.7   Lymphocyte %  17.0 - 53.0 % 17.2   Monocyte %  2.0 - 12.0 % 10   Eosinophil %  0.0 - 7.0 % 0.1   Basophil %  0.0 - 3.0 % 0   Neutrophils Absolute  1.5 - 7.1 x10(3)/ul 4.9   Lymphocytes Absolute  1.0 - 3.5 x10(3)/ul 1.2   Monocytes Absolute  0.0 - 1.0 x10(3)/ul 0.7   Eosinophils Absolute  0.0 - 0.7 x10(3)/ul 0   Basophils Absolute  0.0 - 0.3 x10(3)/ul 0   Narrative    Labs/UA/Imaging   Ordered by Discern Expert.    Specimen Collected: 02/17/25 17:20    Performed by: [facility] Last Resulted: 02/17/25 17:45   Received From: [facility]  Result Received: 03/26/25 15:10    Received Information  LACTIC ACID, PLASMA  Order: [id number]   suggestion  Result Information  displayed in this report will not trend and may not trigger automated decision support.     Component  Ref Range & Units 4 mo ago   Lactate  0.5 - 1.9 mMole/Liter 0.9   Narrative    Labs/UA/Imaging  g20 L FA    Specimen Collected: 02/17/25 17:20    Performed by: [facility] Last Resulted: 02/17/25 17:58   Received From: [facility]  Result Received: 03/26/25 15:10    Received Information  TROPONIN I, HIGH SENSITIVE  Order: [id number]   suggestion  Information displayed in this report may not trend or trigger automated decision support.     Component  Ref Range & Units 4 mo ago   TROPONIN I, HIGH SENSITIVE  <=14 ng/Liter 8   Narrative    Labs/UA/Imaging  g20 L FA    Specimen Collected: 02/17/25 17:20    Performed by: [facility] Last Resulted: 02/17/25 18:04   Received From: [facility]  Result Received: 03/26/25 15:10    Received Information  TSH RFX ON ABNORMAL TO FREE T4  Order: [id number]   suggestion  Result Information displayed in this report will not trend and may not trigger automated decision support.     Component  Ref Range & Units 4 mo ago   TSH  0.50 - 5.70 uIU/mL 2.12   Narrative    Labs/UA/Imaging  move to next blue bed    Specimen Collected: 02/17/25 17:20    Performed by: [facility] Last Resulted: 02/17/25 18:15   Received From: [facility]  Result Received: 03/26/25 15:10    Received Information   Contains abnormal data COMPREHENSIVE METABOLIC PANEL  Order: [id number]   suggestion  Result Information displayed in this report will not trend and may not trigger automated decision support.     Component  Ref Range & Units 4 mo ago   Sodium  136 - 145 mmol/L 134 Low    Potassium  3.5 - 5.1 mmol/L 3.6   Chloride  98 - 110 mmol/L 99   CO2  21 - 31 mmol/L 23   Anion Gap  2.0 - 11.0 12 High    Calcium  8.6 - 10.2 mg/dL 9.5   Glucose  74 - 109 mg/dL 88   BUN  7 - 25 mg/dL 17   Creatinine  0.60 - 1.20 mg/dL 0.67   BUN/Creatinine Ratio  6.0 - 22.0 25.4 High     Albumin  3.5 - 5.2 Gram/dL 4.3   Total Protein  6.4 - 8.9 Gram/dL 7.6   A/G Ratio  1.0 - 1.7 1.3   Alkaline Phosphatase  34 - 104 Units/Liter 70   ALT (SGPT)  <=24 Units/Liter 11   AST  13 - 39 Units/Liter 20   Total Bilirubin  0.3 - 1.0 mg/dL 0.6   Globulin, Total  Gram/dL 3   EGFR  >=60 mL/min/1.73m2 81   Comment: eGFR calculation performed using the CKD-EPI 2021 equation (race variable excluded)   Narrative    Labs/UA/Imaging  move to next blue bed    Specimen Collected: 02/17/25 17:20    Performed by: [facility] LAB Last Resulted: 02/17/25 18:16   Received From: [facility]  Result Received: 03/26/25 15:10    Received Information   Contains abnormal data MAGNESIUM  Order: [id number]  Specimen Information: Blood       Component  Ref Range & Units (hover) 4 mo ago   Magnesium 1.8 Low               Narrative    Labs/UA/Imaging  move to next blue bed   Specimen Collected: 02/17/25 17:20 EST    Performed By: [facility] LAB Last Resulted: 02/17/25 18:16 EST   Received From: [facility]  Result Received: 03/26/25 15:10       Received Information       View Full Report  Assessment & Plan   Diagnoses and all orders for this visit:    1. Abnormal weight loss (Primary)  -     CT Chest Without Contrast; Future    2. Hyponatremia  -     CT Chest Without Contrast; Future    3. Chronic cough  -     CT Chest Without Contrast; Future           Assessment & Plan  1. Patient with hyponatremia with labs c/w SIADH;  No chf, cirrhosis;  no kidney failure;   It responded to IVF after GI illness, but subsequently dropped again.   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     2. Gastroesophageal Reflux Disease (GERD).  - Reports coughing up phlegm after eating and occasional difficulty swallowing, which may be due to reflux.  - Physical exam findings include no significant belly pain and no leg swelling.  - Scheduled to see   Jose Antonio, a gastroenterologist, in a couple of months. If swallowing difficulties worsen, an earlier appointment with Dr. Brady will be arranged.  - Management includes monitoring symptoms and ensuring follow-up with the gastroenterologist.    Follow-up  The patient will follow up in 1 month.          -Follow up:     ________________________________________  Leoncio Stoll DO, MS    Current Outpatient Medications on File Prior to Visit   Medication Sig Dispense Refill    aspirin 325 MG tablet Take 1 tablet by mouth Daily. PLEASE CALL THE OFFICE FOR AN APPT 15 tablet 0    Cyanocobalamin (Vitamin B-12) 1000 MCG sublingual tablet 1 tablet.      fluticasone (FLONASE) 50 MCG/ACT nasal spray INSTILL 2 SPRAYS INTO EACH NOSTRIL DAILY AS DIRECTED 48 g 0    ketorolac (ACULAR) 0.5 % ophthalmic solution       levothyroxine (SYNTHROID, LEVOTHROID) 112 MCG tablet Take 1 tablet by mouth Daily. 30 tablet 2    LORazepam (Ativan) 0.5 MG tablet 1 po daily prn anxiety 90 tablet 2    metoprolol succinate XL (TOPROL-XL) 25 MG 24 hr tablet Take 1 tablet by mouth Daily. 30 tablet 2    ondansetron (Zofran) 8 MG tablet Take 1 tablet by mouth Every 8 (Eight) Hours As Needed for Nausea or Vomiting. 90 tablet 1    ondansetron ODT (ZOFRAN-ODT) 4 MG disintegrating tablet Place 1 tablet on the tongue Every 8 (Eight) Hours As Needed for Nausea or Vomiting. 24 tablet 0    pantoprazole (PROTONIX) 40 MG EC tablet Take 1 tablet by mouth 2 (Two) Times a Day. 180 tablet 3    polyethylene glycol (MIRALAX) 17 g packet Take 17 g by mouth Daily. 90 packet 3    vitamin B-12 (CYANOCOBALAMIN) 1000 MCG tablet Take 1 tablet by mouth Daily. 90 tablet 3    vitamin D3 125 MCG (5000 UT) capsule capsule Take 1 capsule by mouth Daily. 90 capsule 3     Current Facility-Administered Medications on File Prior to Visit   Medication Dose Route Frequency Provider Last Rate Last Admin    cyanocobalamin injection 1,000 mcg  1,000 mcg Intramuscular Q28 Days Leoncio Stoll,  DO   1,000 mcg at 06/11/25 1454

## 2025-06-26 ENCOUNTER — TELEPHONE (OUTPATIENT)
Dept: FAMILY MEDICINE CLINIC | Facility: CLINIC | Age: OVER 89
End: 2025-06-26
Payer: MEDICARE

## 2025-06-26 NOTE — TELEPHONE ENCOUNTER
Patient is scheduled at [facility] @ 11 am on Friday 6.27.25  Spoke with Oscar (caretaker) he is aware    No prep needed

## 2025-07-06 ENCOUNTER — TELEPHONE (OUTPATIENT)
Dept: FAMILY MEDICINE CLINIC | Facility: CLINIC | Age: OVER 89
End: 2025-07-06
Payer: MEDICARE

## 2025-07-06 NOTE — TELEPHONE ENCOUNTER
Let patient know CT chest noted some scarring, small benign cysts on liver, but no lung nodules, masses or signs of cancer thankfully.  RRJ

## 2025-07-07 NOTE — TELEPHONE ENCOUNTER
Hub to relay    Let patient know CT chest noted some scarring, small benign cysts on liver, but no lung nodules, masses or signs of cancer thankfully.  RRJ     I left detailed msg informing pt

## 2025-07-25 ENCOUNTER — OFFICE VISIT (OUTPATIENT)
Dept: FAMILY MEDICINE CLINIC | Facility: CLINIC | Age: OVER 89
End: 2025-07-25
Payer: MEDICARE

## 2025-07-25 VITALS
BODY MASS INDEX: 26.7 KG/M2 | DIASTOLIC BLOOD PRESSURE: 88 MMHG | WEIGHT: 136 LBS | HEIGHT: 60 IN | OXYGEN SATURATION: 97 % | HEART RATE: 70 BPM | SYSTOLIC BLOOD PRESSURE: 158 MMHG

## 2025-07-25 DIAGNOSIS — E03.9 ACQUIRED HYPOTHYROIDISM: ICD-10-CM

## 2025-07-25 DIAGNOSIS — E53.8 B12 DEFICIENCY: ICD-10-CM

## 2025-07-25 DIAGNOSIS — E87.1 HYPONATREMIA: Primary | ICD-10-CM

## 2025-07-25 DIAGNOSIS — L50.9 URTICARIA: ICD-10-CM

## 2025-07-25 RX ORDER — FEXOFENADINE HCL 180 MG/1
180 TABLET ORAL 2 TIMES DAILY
Qty: 180 TABLET | Refills: 3 | Status: SHIPPED | OUTPATIENT
Start: 2025-07-25

## 2025-07-25 RX ORDER — FAMOTIDINE 20 MG/1
20 TABLET, FILM COATED ORAL 2 TIMES DAILY
Qty: 180 TABLET | Refills: 3 | Status: SHIPPED | OUTPATIENT
Start: 2025-07-25

## 2025-07-25 RX ADMIN — CYANOCOBALAMIN 1000 MCG: 1000 INJECTION, SOLUTION INTRAMUSCULAR; SUBCUTANEOUS at 13:54

## 2025-07-25 NOTE — PROGRESS NOTES
Subjective   Kelvin Restrepo is a 89 y.o. female. Presents today for   Chief Complaint   Patient presents with    Hypothyroidism    Hypertension         History of Present Illness  History of Present Illness  The patient is a 89-year-old female here for follow-up. She has been experiencing recurrent hyponatremia, with lab findings consistent with SIADH. A CT chest was performed on 06/25/2025 to evaluate for pulmonary malignancy, revealing no acute intrathoracic findings, masses, or concerning findings. Background findings included chronic interstitial lung disease, peripheral fibrosis, small scattered hepatic cysts, a small hiatal hernia, and a 4 cm ascending thoracic aortic aneurysm. Additionally, a CT abdomen and pelvis with contrast on 05/14/2025 showed no obvious source for acute abdominal discomfort, cystocele, and sigmoid diverticulitis. A CT head without contrast on the same date indicated no evidence of acute intracranial hemorrhage or mass effect, moderate chronic small vessel changes in the white matter, and mild diffuse cerebral atrophy. She has a history of hypothyroidism and hypertension but is not on typical blood pressure medication due to hyponatremia and is currently taking metoprolol. Her thyroid function has been stable, with the last TSH level at 2.660 on 06/12/2025. She is accompanied by her grandson.    She reports feeling well today and is breathing comfortably. She is uncertain about her blood sugar levels and is not diagnosed with diabetes. To manage her sodium levels, she consumes a diet high in salt and sweets.    She has been experiencing severe hives for nearly a decade, which have worsened over time. The hives are present on both legs, buttocks, and back. Despite being prescribed five different medications, the hives persist and occasionally intensify.    She also mentions that she wakes up early in the morning after only 3 to 4 hours of sleep and tends to nap during the day.    She was  "previously on vitamin B12 tablets but switched to injections, which she believes improved her condition. She is interested in receiving a vitamin B12 injection today.    Diet: Consumes a diet high in salt and sweets  Sleep: Wakes up early in the morning after 3 to 4 hours of sleep and naps during the day  Review of Systems   Respiratory:  Negative for shortness of breath.    Cardiovascular:  Negative for chest pain and palpitations.   Gastrointestinal:  Negative for abdominal pain.       Patient Active Problem List   Diagnosis    Suprapubic pain    Allergic state    Chronic constipation    Gastroesophageal reflux disease    Fatigue    Hyperlipidemia    Hypothyroidism    Insomnia    Obstructive sleep apnea syndrome    Osteoarthritis of multiple joints    Osteoporosis    Cobalamin deficiency    Essential hypertension    Anxiety    H/O right mastectomy    H/O hyperlipidemia    History of breast cancer    History of hypothyroidism    Seasonal allergic rhinitis    Glaucoma (increased eye pressure)       Social History     Socioeconomic History    Marital status:    Tobacco Use    Smoking status: Never     Passive exposure: Never    Smokeless tobacco: Never   Vaping Use    Vaping status: Never Used   Substance and Sexual Activity    Alcohol use: No    Drug use: No    Sexual activity: Not Currently     Partners: Male     Birth control/protection: Post-menopausal       Allergies   Allergen Reactions    Amlodipine      Patient does not recall ever having a reaction to this medication     Codeine Other (See Comments)    Prednisone Hives    Atorvastatin Myalgia         Objective   Vitals:    07/25/25 1313   BP: 158/88   Pulse: 70   SpO2: 97%   Weight: 61.7 kg (136 lb)   Height: 152.4 cm (60\")     Body mass index is 26.56 kg/m².    Physical Exam  Vitals and nursing note reviewed.   Constitutional:       Appearance: She is well-developed.   HENT:      Head: Normocephalic and atraumatic.   Neck:      Thyroid: No " thyromegaly.      Vascular: No JVD.   Cardiovascular:      Rate and Rhythm: Normal rate and regular rhythm.      Heart sounds: Normal heart sounds. No murmur heard.     No friction rub. No gallop.   Pulmonary:      Effort: Pulmonary effort is normal. No respiratory distress.      Breath sounds: Normal breath sounds. No wheezing or rales.   Abdominal:      General: Bowel sounds are normal. There is no distension.      Palpations: Abdomen is soft.      Tenderness: There is no abdominal tenderness. There is no guarding or rebound.   Musculoskeletal:      Cervical back: Neck supple.   Skin:     General: Skin is warm and dry.   Neurological:      Mental Status: She is alert.      Gait: Gait normal.   Psychiatric:         Behavior: Behavior normal.       Physical Exam  General: No acute distress, appears younger than stated age.  Skin: Hives present on legs, buttocks, and back.    Results  Labs   - TSH: 06/12/2025, 2.660   - Salivary cortisol level: 06/12/2025, 0.267   - ACTH: 06/12/2025, 13.4   - Creatinine: 06/12/2025, 0.75   - Sodium: 06/12/2025, 126   - Serum osmolality: 06/12/2025, 258   - Urine osmolality: 06/12/2025, 227   - Urine creatinine: 06/12/2025, 31.4   - Urine sodium: 06/12/2025, 29   - Testosterone: Normal   - Estradiol: Normal    Imaging   - CT chest: 06/25/2025, No acute intrathoracic findings, no masses or concerning findings. Background chronic interstitial lung disease, peripheral fibrosis, no adenopathy, some small scattered hepatic cysts, small hiatal hernia, and a 4 cm ascending thoracic aortic aneurysm.   - CT abdomen and pelvis with contrast: 05/14/2025, No obvious source for acute abdominal discomfort, cystocele, sigmoid diverticulitis.   - CT head without contrast: 05/14/2025, No evidence of acute intracranial hemorrhage or mass effect, moderate chronic small vessel changes in the white matter and mild diffuse cerebral atrophy.     Assessment & Plan   Diagnoses and all orders for this  visit:    1. Hyponatremia (Primary)  -     Comprehensive Metabolic Panel  -     TSH    2. Acquired hypothyroidism  -     TSH    3. Urticaria  -     famotidine (Pepcid) 20 MG tablet; Take 1 tablet by mouth 2 (Two) Times a Day.  Dispense: 180 tablet; Refill: 3  -     fexofenadine (ALLEGRA) 180 MG tablet; Take 1 tablet by mouth 2 (Two) Times a Day.  Dispense: 180 tablet; Refill: 3    4. B12 deficiency           Assessment & Plan  1. Hyponatremia.  - Recurrent hyponatremia with lab findings consistent with SIADH.  - Previous labs: sodium 126, serum osmolality 258, urine osmolality 227, urine creatinine 31.4, urine sodium 29.  - CT chest and CT abdomen and pelvis with contrast showed no acute findings related to hyponatremia.  - Blood work will be conducted today to monitor sodium levels.    2. Hives.  - Reports experiencing hives for nearly 10 years, which have worsened recently.  - Prescription for famotidine and Allegra, to be taken twice daily, provided to manage hives.  - Medications chosen because they do not cause sedation.  - If hives do not improve, she should notify the clinic.    3. Hypothyroidism.  - Thyroid function stable with TSH level of 2.660 as of 06/12/2025.  - Blood work will be conducted today to monitor thyroid levels.    4. Hypertension.  - Currently on metoprolol for hypertension management     5. Health Maintenance.  - Will receive a vitamin B12 injection today.  - Advised to get an influenza vaccine at the end of 09/2025.    Follow-up: A follow-up visit is scheduled in 2 months.          -Follow up:     ________________________________________  Leoncio Stoll DO, MS    Current Outpatient Medications on File Prior to Visit   Medication Sig Dispense Refill    aspirin 325 MG tablet Take 1 tablet by mouth Daily. PLEASE CALL THE OFFICE FOR AN APPT 15 tablet 0    Cyanocobalamin (Vitamin B-12) 1000 MCG sublingual tablet 1 tablet.      fluticasone (FLONASE) 50 MCG/ACT nasal spray INSTILL 2 SPRAYS  INTO EACH NOSTRIL DAILY AS DIRECTED 48 g 0    ketorolac (ACULAR) 0.5 % ophthalmic solution       levothyroxine (SYNTHROID, LEVOTHROID) 112 MCG tablet Take 1 tablet by mouth Daily. 30 tablet 2    LORazepam (Ativan) 0.5 MG tablet 1 po daily prn anxiety 90 tablet 2    metoprolol succinate XL (TOPROL-XL) 25 MG 24 hr tablet Take 1 tablet by mouth Daily. 30 tablet 2    ondansetron (Zofran) 8 MG tablet Take 1 tablet by mouth Every 8 (Eight) Hours As Needed for Nausea or Vomiting. 90 tablet 1    ondansetron ODT (ZOFRAN-ODT) 4 MG disintegrating tablet Place 1 tablet on the tongue Every 8 (Eight) Hours As Needed for Nausea or Vomiting. 24 tablet 0    pantoprazole (PROTONIX) 40 MG EC tablet Take 1 tablet by mouth 2 (Two) Times a Day. 180 tablet 3    polyethylene glycol (MIRALAX) 17 g packet Take 17 g by mouth Daily. 90 packet 3    vitamin B-12 (CYANOCOBALAMIN) 1000 MCG tablet Take 1 tablet by mouth Daily. 90 tablet 3    vitamin D3 125 MCG (5000 UT) capsule capsule Take 1 capsule by mouth Daily. 90 capsule 3     Current Facility-Administered Medications on File Prior to Visit   Medication Dose Route Frequency Provider Last Rate Last Admin    cyanocobalamin injection 1,000 mcg  1,000 mcg Intramuscular Q28 Days Leoncio Stoll DO   1,000 mcg at 06/11/25 9856

## 2025-07-26 LAB
ALBUMIN SERPL-MCNC: 4 G/DL (ref 3.5–5.2)
ALBUMIN/GLOB SERPL: 1.5 G/DL
ALP SERPL-CCNC: 72 U/L (ref 39–117)
ALT SERPL-CCNC: 15 U/L (ref 1–33)
AST SERPL-CCNC: 19 U/L (ref 1–32)
BILIRUB SERPL-MCNC: 0.5 MG/DL (ref 0–1.2)
BUN SERPL-MCNC: 15 MG/DL (ref 8–23)
BUN/CREAT SERPL: 18.5 (ref 7–25)
CALCIUM SERPL-MCNC: 9.3 MG/DL (ref 8.2–9.6)
CHLORIDE SERPL-SCNC: 92 MMOL/L (ref 98–107)
CO2 SERPL-SCNC: 28.2 MMOL/L (ref 22–29)
CREAT SERPL-MCNC: 0.81 MG/DL (ref 0.57–1)
EGFRCR SERPLBLD CKD-EPI 2021: 67.4 ML/MIN/1.73
GLOBULIN SER CALC-MCNC: 2.6 GM/DL
GLUCOSE SERPL-MCNC: 75 MG/DL (ref 65–99)
POTASSIUM SERPL-SCNC: 4.8 MMOL/L (ref 3.5–5.2)
PROT SERPL-MCNC: 6.6 G/DL (ref 6–8.5)
SODIUM SERPL-SCNC: 132 MMOL/L (ref 136–145)
TSH SERPL DL<=0.005 MIU/L-ACNC: 0.93 UIU/ML (ref 0.27–4.2)

## 2025-07-27 ENCOUNTER — RESULTS FOLLOW-UP (OUTPATIENT)
Dept: FAMILY MEDICINE CLINIC | Facility: CLINIC | Age: OVER 89
End: 2025-07-27
Payer: MEDICARE

## 2025-07-27 NOTE — LETTER
Kelvin GREENE Lauro  [id number] [facility] [address]    July 27, 2025     Dear Ms. Restrepo:    Below are the results from your recent visit:    Sodium level has improved  Thyroid appropriate range    Resulted Orders   Comprehensive Metabolic Panel   Result Value Ref Range    Glucose 75 65 - 99 mg/dL    BUN 15.0 8.0 - 23.0 mg/dL    Creatinine 0.81 0.57 - 1.00 mg/dL    EGFR Result 67.4 >60.0 mL/min/1.73      Comment:      GFR Categories in Chronic Kidney Disease (CKD)  GFR Category          GFR (mL/min/1.73)    Interpretation  G1                    90 or greater        Normal or high  (1)  G2                    60-89                Mild decrease  (1)  G3a                   45-59                Mild to moderate  decrease  G3b                   30-44                Moderate to  severe decrease  G4                    15-29                Severe decrease  G5                    14 or less           Kidney failure  (1)In the absence of evidence of kidney disease, neither  GFR category G1 or G2 fulfill the criteria for CKD.  eGFR calculation 2021 CKD-EPI creatinine equation, which  does not include race as a factor      BUN/Creatinine Ratio 18.5 7.0 - 25.0    Sodium 132 (L) 136 - 145 mmol/L    Potassium 4.8 3.5 - 5.2 mmol/L    Chloride 92 (L) 98 - 107 mmol/L    Total CO2 28.2 22.0 - 29.0 mmol/L    Calcium 9.3 8.2 - 9.6 mg/dL    Total Protein 6.6 6.0 - 8.5 g/dL    Albumin 4.0 3.5 - 5.2 g/dL    Globulin 2.6 gm/dL    A/G Ratio 1.5 g/dL    Total Bilirubin 0.5 0.0 - 1.2 mg/dL    Alkaline Phosphatase 72 39 - 117 U/L    AST (SGOT) 19 1 - 32 U/L    ALT (SGPT) 15 1 - 33 U/L   TSH   Result Value Ref Range    TSH 0.933 0.270 - 4.200 uIU/mL     If you have any questions or concerns, please don't hesitate to call.    Sincerely,        Leoncio Stoll, DO

## 2025-08-11 ENCOUNTER — TELEPHONE (OUTPATIENT)
Dept: FAMILY MEDICINE CLINIC | Facility: CLINIC | Age: OVER 89
End: 2025-08-11
Payer: MEDICARE

## 2025-08-11 DIAGNOSIS — I10 ESSENTIAL HYPERTENSION: ICD-10-CM

## 2025-08-11 RX ORDER — METOPROLOL SUCCINATE 25 MG/1
25 TABLET, EXTENDED RELEASE ORAL DAILY
Qty: 90 TABLET | Refills: 1 | Status: SHIPPED | OUTPATIENT
Start: 2025-08-11

## 2025-08-11 RX ORDER — METOPROLOL SUCCINATE 25 MG/1
25 TABLET, EXTENDED RELEASE ORAL DAILY
Qty: 30 TABLET | Refills: 2 | Status: SHIPPED | OUTPATIENT
Start: 2025-08-11 | End: 2025-08-11 | Stop reason: SDUPTHER

## 2025-08-26 DIAGNOSIS — E03.8 OTHER SPECIFIED HYPOTHYROIDISM: ICD-10-CM

## 2025-08-26 RX ORDER — LEVOTHYROXINE SODIUM 112 UG/1
112 TABLET ORAL DAILY
Qty: 30 TABLET | Refills: 2 | Status: SHIPPED | OUTPATIENT
Start: 2025-08-26